# Patient Record
Sex: FEMALE | Race: WHITE | HISPANIC OR LATINO | URBAN - METROPOLITAN AREA
[De-identification: names, ages, dates, MRNs, and addresses within clinical notes are randomized per-mention and may not be internally consistent; named-entity substitution may affect disease eponyms.]

---

## 2017-02-27 ENCOUNTER — OUTPATIENT (OUTPATIENT)
Dept: OUTPATIENT SERVICES | Facility: HOSPITAL | Age: 22
LOS: 1 days | End: 2017-02-27
Payer: COMMERCIAL

## 2017-02-27 VITALS
TEMPERATURE: 97 F | SYSTOLIC BLOOD PRESSURE: 107 MMHG | DIASTOLIC BLOOD PRESSURE: 70 MMHG | WEIGHT: 173.94 LBS | RESPIRATION RATE: 18 BRPM | HEART RATE: 72 BPM | OXYGEN SATURATION: 100 %

## 2017-02-27 DIAGNOSIS — Z98.89 OTHER SPECIFIED POSTPROCEDURAL STATES: Chronic | ICD-10-CM

## 2017-02-27 DIAGNOSIS — Z90.49 ACQUIRED ABSENCE OF OTHER SPECIFIED PARTS OF DIGESTIVE TRACT: Chronic | ICD-10-CM

## 2017-02-27 DIAGNOSIS — Z98.890 OTHER SPECIFIED POSTPROCEDURAL STATES: Chronic | ICD-10-CM

## 2017-02-27 DIAGNOSIS — S83.242S OTHER TEAR OF MEDIAL MENISCUS, CURRENT INJURY, LEFT KNEE, SEQUELA: ICD-10-CM

## 2017-02-27 DIAGNOSIS — M25.562 PAIN IN LEFT KNEE: ICD-10-CM

## 2017-02-27 DIAGNOSIS — Z01.818 ENCOUNTER FOR OTHER PREPROCEDURAL EXAMINATION: ICD-10-CM

## 2017-02-27 DIAGNOSIS — M22.42 CHONDROMALACIA PATELLAE, LEFT KNEE: ICD-10-CM

## 2017-02-27 LAB
HCT VFR BLD CALC: 37.8 % — SIGNIFICANT CHANGE UP (ref 34.5–45)
HGB BLD-MCNC: 12.6 G/DL — SIGNIFICANT CHANGE UP (ref 11.5–15.5)
MCHC RBC-ENTMCNC: 27.9 PG — SIGNIFICANT CHANGE UP (ref 27–34)
MCHC RBC-ENTMCNC: 33.4 GM/DL — SIGNIFICANT CHANGE UP (ref 32–36)
MCV RBC AUTO: 83.6 FL — SIGNIFICANT CHANGE UP (ref 80–100)
PLATELET # BLD AUTO: 299 K/UL — SIGNIFICANT CHANGE UP (ref 150–400)
RBC # BLD: 4.52 M/UL — SIGNIFICANT CHANGE UP (ref 3.8–5.2)
RBC # FLD: 12 % — SIGNIFICANT CHANGE UP (ref 10.3–14.5)
WBC # BLD: 8.7 K/UL — SIGNIFICANT CHANGE UP (ref 3.8–10.5)
WBC # FLD AUTO: 8.7 K/UL — SIGNIFICANT CHANGE UP (ref 3.8–10.5)

## 2017-02-27 PROCEDURE — 85027 COMPLETE CBC AUTOMATED: CPT

## 2017-02-27 PROCEDURE — G0463: CPT

## 2017-02-27 NOTE — H&P PST ADULT - PROBLEM SELECTOR PLAN 1
scheduled left knee arthroscopy/left knee excision of ganglion cyst, left shoulder steroid injection  medical clearance in chart  pre op instructions provided

## 2017-02-27 NOTE — H&P PST ADULT - HISTORY OF PRESENT ILLNESS
20 y/o female with h/o fall couple of months ago injuring her left leg and left shoulder, scheduled for surgery. Not taking any pain meds at present

## 2017-02-27 NOTE — H&P PST ADULT - PMH
Hypothyroid    Left knee pain, unspecified chronicity History of pulmonary embolism  23 weeks gestation on VQ scan Had lovenox till delivery  Hypothyroid    Left knee pain, unspecified chronicity

## 2017-03-01 RX ORDER — CEFAZOLIN SODIUM 1 G
2000 VIAL (EA) INJECTION ONCE
Qty: 0 | Refills: 0 | Status: COMPLETED | OUTPATIENT
Start: 2017-03-03 | End: 2017-03-03

## 2017-03-01 NOTE — ASU DISCHARGE PLAN (ADULT/PEDIATRIC). - DRESSING FT
Replace dressing with dry gauze if saturated. Clean area with alcohol prior to first shower. Remove bandages in 2-3 days, wipe with alcohol and apply band-aids. Rewrap with ace bandage.

## 2017-03-01 NOTE — ASU DISCHARGE PLAN (ADULT/PEDIATRIC). - MEDICATION SUMMARY - MEDICATIONS TO TAKE
I will START or STAY ON the medications listed below when I get home from the hospital:    acetaminophen-oxyCODONE 325 mg-5 mg oral tablet  -- 1 tab(s) by mouth every 6 hours, As Needed -for moderate pain MDD:4  -- Caution federal law prohibits the transfer of this drug to any person other  than the person for whom it was prescribed.  May cause drowsiness.  Alcohol may intensify this effect.  Use care when operating dangerous machinery.  This prescription cannot be refilled.  This product contains acetaminophen.  Do not use  with any other product containing acetaminophen to prevent possible liver damage.  Using more of this medication than prescribed may cause serious breathing problems.    -- Indication: For Pain    Synthroid 75 mcg (0.075 mg) oral tablet  -- 1 tab(s) by mouth once a day  -- Indication: For Hypothyroid

## 2017-03-01 NOTE — ASU DISCHARGE PLAN (ADULT/PEDIATRIC). - NOTIFY
Pain not relieved by Medications/Fever greater than 101/Numbness, color, or temperature change to extremity/Bleeding that does not stop/Swelling that continues

## 2017-03-01 NOTE — ASU DISCHARGE PLAN (ADULT/PEDIATRIC). - ACTIVITY LEVEL
no tub baths/elevate extremity/no sports/gym/no heavy lifting/weight bearing as tolerated no tub baths/elevate extremity/no sports/gym/no heavy lifting/weight bearing as tolerated/Use cane or crutches for ambulation.

## 2017-03-01 NOTE — ASU DISCHARGE PLAN (ADULT/PEDIATRIC). - FOLLOWUP APPOINTMENT CLINIC/PHYSICIAN
Please call Dr. Bonds's office [phone] to make a post operative appointment to be seen in 2 weeks. Follow up with your medical doctor in 1 - 3 days to review your home medications. Please call Dr. NARA Bonds's office ([phone]) to schedule a follow-up appointment to be seen in 2 weeks.  Follow up with your medical doctor in 1 - 3 days to review your home medications.

## 2017-03-01 NOTE — ASU DISCHARGE PLAN (ADULT/PEDIATRIC). - INSTRUCTIONS
Fully flex and extend knee while standing 200x per day. Pain medications as directed by MD. Ice 20 minutes on and 20 minutes off while awake for next 48 hours elevate leg when sitting.  DO NOT make important decisions, drink alcohol, or operate machinery until after the first 24 hours and/or while on pain medication. DO NOT take aspirin or Motrin (Ibuprofen) any sooner than 24 hours after discharge from hospital. Fully flex and extend knee while standing 200x per day. Physical Therapy 5x / 1wk, then 4x / 2wks, then 2x / 3wks.  Take pain medications as directed by MD.  Ice 20 minutes on and 20 minutes off while awake for next 48 hours elevate leg when sitting. Follow instructions on prescriptions given and resume regular medications.

## 2017-03-02 ENCOUNTER — RESULT REVIEW (OUTPATIENT)
Age: 22
End: 2017-03-02

## 2017-03-02 NOTE — ASU PATIENT PROFILE, ADULT - PMH
History of pulmonary embolism  23 weeks gestation on VQ scan Had lovenox till delivery  Hypothyroid    Left knee pain, unspecified chronicity

## 2017-03-03 ENCOUNTER — TRANSCRIPTION ENCOUNTER (OUTPATIENT)
Age: 22
End: 2017-03-03

## 2017-03-03 ENCOUNTER — OUTPATIENT (OUTPATIENT)
Dept: OUTPATIENT SERVICES | Facility: HOSPITAL | Age: 22
LOS: 1 days | End: 2017-03-03
Payer: COMMERCIAL

## 2017-03-03 VITALS
OXYGEN SATURATION: 98 % | HEART RATE: 72 BPM | DIASTOLIC BLOOD PRESSURE: 58 MMHG | RESPIRATION RATE: 15 BRPM | SYSTOLIC BLOOD PRESSURE: 112 MMHG

## 2017-03-03 VITALS
DIASTOLIC BLOOD PRESSURE: 57 MMHG | HEART RATE: 68 BPM | TEMPERATURE: 98 F | RESPIRATION RATE: 13 BRPM | HEIGHT: 59 IN | WEIGHT: 173.06 LBS | SYSTOLIC BLOOD PRESSURE: 99 MMHG | OXYGEN SATURATION: 99 %

## 2017-03-03 DIAGNOSIS — Z90.49 ACQUIRED ABSENCE OF OTHER SPECIFIED PARTS OF DIGESTIVE TRACT: Chronic | ICD-10-CM

## 2017-03-03 DIAGNOSIS — Z98.890 OTHER SPECIFIED POSTPROCEDURAL STATES: Chronic | ICD-10-CM

## 2017-03-03 DIAGNOSIS — S83.242S OTHER TEAR OF MEDIAL MENISCUS, CURRENT INJURY, LEFT KNEE, SEQUELA: ICD-10-CM

## 2017-03-03 DIAGNOSIS — Z98.89 OTHER SPECIFIED POSTPROCEDURAL STATES: Chronic | ICD-10-CM

## 2017-03-03 DIAGNOSIS — M22.42 CHONDROMALACIA PATELLAE, LEFT KNEE: ICD-10-CM

## 2017-03-03 DIAGNOSIS — Z01.818 ENCOUNTER FOR OTHER PREPROCEDURAL EXAMINATION: ICD-10-CM

## 2017-03-03 LAB — HCG UR QL: NEGATIVE — SIGNIFICANT CHANGE UP

## 2017-03-03 PROCEDURE — 20610 DRAIN/INJ JOINT/BURSA W/O US: CPT | Mod: LT,XS

## 2017-03-03 PROCEDURE — 29875 ARTHRS KNEE SURG SYNVCT LMTD: CPT | Mod: LT

## 2017-03-03 PROCEDURE — 88304 TISSUE EXAM BY PATHOLOGIST: CPT | Mod: 26

## 2017-03-03 PROCEDURE — 88304 TISSUE EXAM BY PATHOLOGIST: CPT

## 2017-03-03 PROCEDURE — 81025 URINE PREGNANCY TEST: CPT

## 2017-03-03 RX ORDER — SODIUM CHLORIDE 9 MG/ML
1000 INJECTION, SOLUTION INTRAVENOUS
Qty: 0 | Refills: 0 | Status: DISCONTINUED | OUTPATIENT
Start: 2017-03-03 | End: 2017-03-03

## 2017-03-03 RX ORDER — OXYCODONE HYDROCHLORIDE 5 MG/1
1 TABLET ORAL
Qty: 28 | Refills: 0 | OUTPATIENT
Start: 2017-03-03 | End: 2017-03-10

## 2017-03-03 RX ORDER — HYDROMORPHONE HYDROCHLORIDE 2 MG/ML
0.5 INJECTION INTRAMUSCULAR; INTRAVENOUS; SUBCUTANEOUS
Qty: 0 | Refills: 0 | Status: DISCONTINUED | OUTPATIENT
Start: 2017-03-03 | End: 2017-03-03

## 2017-03-03 RX ADMIN — HYDROMORPHONE HYDROCHLORIDE 0.5 MILLIGRAM(S): 2 INJECTION INTRAMUSCULAR; INTRAVENOUS; SUBCUTANEOUS at 13:50

## 2017-03-03 RX ADMIN — SODIUM CHLORIDE 75 MILLILITER(S): 9 INJECTION, SOLUTION INTRAVENOUS at 13:43

## 2017-03-03 RX ADMIN — HYDROMORPHONE HYDROCHLORIDE 0.5 MILLIGRAM(S): 2 INJECTION INTRAMUSCULAR; INTRAVENOUS; SUBCUTANEOUS at 13:33

## 2017-03-03 NOTE — PHYSICAL THERAPY INITIAL EVALUATION ADULT - DID THE PATIENT HAVE SURGERY?
yes/s/p arthroscopy chondroplasty excision of ganglion cyst left knee/steroid injection left shoulder

## 2017-07-25 ENCOUNTER — EMERGENCY (EMERGENCY)
Facility: HOSPITAL | Age: 22
LOS: 1 days | Discharge: HOME | End: 2017-07-25
Attending: STUDENT IN AN ORGANIZED HEALTH CARE EDUCATION/TRAINING PROGRAM
Payer: COMMERCIAL

## 2017-07-25 VITALS — RESPIRATION RATE: 20 BRPM | OXYGEN SATURATION: 100 % | TEMPERATURE: 99 F | HEART RATE: 72 BPM | WEIGHT: 182.98 LBS

## 2017-07-25 DIAGNOSIS — Z90.49 ACQUIRED ABSENCE OF OTHER SPECIFIED PARTS OF DIGESTIVE TRACT: Chronic | ICD-10-CM

## 2017-07-25 DIAGNOSIS — Z98.89 OTHER SPECIFIED POSTPROCEDURAL STATES: Chronic | ICD-10-CM

## 2017-07-25 DIAGNOSIS — Z98.890 OTHER SPECIFIED POSTPROCEDURAL STATES: Chronic | ICD-10-CM

## 2017-07-25 LAB
ALBUMIN SERPL ELPH-MCNC: 4.5 G/DL — SIGNIFICANT CHANGE UP (ref 3.3–5.2)
ALP SERPL-CCNC: 76 U/L — SIGNIFICANT CHANGE UP (ref 40–120)
ALT FLD-CCNC: 27 U/L — SIGNIFICANT CHANGE UP
ANION GAP SERPL CALC-SCNC: 12 MMOL/L — SIGNIFICANT CHANGE UP (ref 5–17)
AST SERPL-CCNC: 17 U/L — SIGNIFICANT CHANGE UP
BASOPHILS # BLD AUTO: 0 K/UL — SIGNIFICANT CHANGE UP (ref 0–0.2)
BASOPHILS NFR BLD AUTO: 0.2 % — SIGNIFICANT CHANGE UP (ref 0–2)
BILIRUB SERPL-MCNC: 0.2 MG/DL — LOW (ref 0.4–2)
BUN SERPL-MCNC: 10 MG/DL — SIGNIFICANT CHANGE UP (ref 8–20)
CALCIUM SERPL-MCNC: 9.3 MG/DL — SIGNIFICANT CHANGE UP (ref 8.6–10.2)
CHLORIDE SERPL-SCNC: 102 MMOL/L — SIGNIFICANT CHANGE UP (ref 98–107)
CK SERPL-CCNC: 44 U/L — SIGNIFICANT CHANGE UP (ref 25–170)
CO2 SERPL-SCNC: 26 MMOL/L — SIGNIFICANT CHANGE UP (ref 22–29)
CREAT SERPL-MCNC: 0.41 MG/DL — LOW (ref 0.5–1.3)
EOSINOPHIL # BLD AUTO: 0.3 K/UL — SIGNIFICANT CHANGE UP (ref 0–0.5)
EOSINOPHIL NFR BLD AUTO: 3.5 % — SIGNIFICANT CHANGE UP (ref 0–6)
GLUCOSE SERPL-MCNC: 99 MG/DL — SIGNIFICANT CHANGE UP (ref 70–115)
HCG UR QL: NEGATIVE — SIGNIFICANT CHANGE UP
HCT VFR BLD CALC: 37 % — SIGNIFICANT CHANGE UP (ref 37–47)
HGB BLD-MCNC: 12.2 G/DL — SIGNIFICANT CHANGE UP (ref 12–16)
LIDOCAIN IGE QN: 25 U/L — SIGNIFICANT CHANGE UP (ref 22–51)
LYMPHOCYTES # BLD AUTO: 2.3 K/UL — SIGNIFICANT CHANGE UP (ref 1–4.8)
LYMPHOCYTES # BLD AUTO: 27 % — SIGNIFICANT CHANGE UP (ref 20–55)
MCHC RBC-ENTMCNC: 27.6 PG — SIGNIFICANT CHANGE UP (ref 27–31)
MCHC RBC-ENTMCNC: 33 G/DL — SIGNIFICANT CHANGE UP (ref 32–36)
MCV RBC AUTO: 83.7 FL — SIGNIFICANT CHANGE UP (ref 81–99)
MONOCYTES # BLD AUTO: 0.7 K/UL — SIGNIFICANT CHANGE UP (ref 0–0.8)
MONOCYTES NFR BLD AUTO: 8.3 % — SIGNIFICANT CHANGE UP (ref 3–10)
NEUTROPHILS # BLD AUTO: 5.2 K/UL — SIGNIFICANT CHANGE UP (ref 1.8–8)
NEUTROPHILS NFR BLD AUTO: 60.5 % — SIGNIFICANT CHANGE UP (ref 37–73)
NT-PROBNP SERPL-SCNC: 18 PG/ML — SIGNIFICANT CHANGE UP (ref 0–300)
PLATELET # BLD AUTO: 320 K/UL — SIGNIFICANT CHANGE UP (ref 150–400)
POTASSIUM SERPL-MCNC: 4 MMOL/L — SIGNIFICANT CHANGE UP (ref 3.5–5.3)
POTASSIUM SERPL-SCNC: 4 MMOL/L — SIGNIFICANT CHANGE UP (ref 3.5–5.3)
PROT SERPL-MCNC: 7.5 G/DL — SIGNIFICANT CHANGE UP (ref 6.6–8.7)
RBC # BLD: 4.42 M/UL — SIGNIFICANT CHANGE UP (ref 4.4–5.2)
RBC # FLD: 13.8 % — SIGNIFICANT CHANGE UP (ref 11–15.6)
SODIUM SERPL-SCNC: 140 MMOL/L — SIGNIFICANT CHANGE UP (ref 135–145)
TROPONIN T SERPL-MCNC: <0.01 NG/ML — SIGNIFICANT CHANGE UP (ref 0–0.06)
WBC # BLD: 8.6 K/UL — SIGNIFICANT CHANGE UP (ref 4.8–10.8)
WBC # FLD AUTO: 8.6 K/UL — SIGNIFICANT CHANGE UP (ref 4.8–10.8)

## 2017-07-25 PROCEDURE — 81025 URINE PREGNANCY TEST: CPT

## 2017-07-25 PROCEDURE — 93005 ELECTROCARDIOGRAM TRACING: CPT

## 2017-07-25 PROCEDURE — 84484 ASSAY OF TROPONIN QUANT: CPT

## 2017-07-25 PROCEDURE — 93010 ELECTROCARDIOGRAM REPORT: CPT

## 2017-07-25 PROCEDURE — 85027 COMPLETE CBC AUTOMATED: CPT

## 2017-07-25 PROCEDURE — 71275 CT ANGIOGRAPHY CHEST: CPT | Mod: 26

## 2017-07-25 PROCEDURE — 36415 COLL VENOUS BLD VENIPUNCTURE: CPT

## 2017-07-25 PROCEDURE — 83690 ASSAY OF LIPASE: CPT

## 2017-07-25 PROCEDURE — 71046 X-RAY EXAM CHEST 2 VIEWS: CPT

## 2017-07-25 PROCEDURE — 83880 ASSAY OF NATRIURETIC PEPTIDE: CPT

## 2017-07-25 PROCEDURE — 82550 ASSAY OF CK (CPK): CPT

## 2017-07-25 PROCEDURE — 99284 EMERGENCY DEPT VISIT MOD MDM: CPT | Mod: 25

## 2017-07-25 PROCEDURE — 80053 COMPREHEN METABOLIC PANEL: CPT

## 2017-07-25 PROCEDURE — 71020: CPT | Mod: 26

## 2017-07-25 PROCEDURE — 71275 CT ANGIOGRAPHY CHEST: CPT

## 2017-07-25 PROCEDURE — 99285 EMERGENCY DEPT VISIT HI MDM: CPT

## 2017-07-25 RX ORDER — SODIUM CHLORIDE 9 MG/ML
3 INJECTION INTRAMUSCULAR; INTRAVENOUS; SUBCUTANEOUS ONCE
Qty: 0 | Refills: 0 | Status: COMPLETED | OUTPATIENT
Start: 2017-07-25 | End: 2017-07-25

## 2017-07-25 RX ADMIN — SODIUM CHLORIDE 3 MILLILITER(S): 9 INJECTION INTRAMUSCULAR; INTRAVENOUS; SUBCUTANEOUS at 20:21

## 2017-07-25 NOTE — ED ADULT TRIAGE NOTE - CHIEF COMPLAINT QUOTE
dizzy, denies pregnancy. dyspnea, history of lung clots. used to be on Lovenox and heparin. has dyspnea now.

## 2017-07-25 NOTE — ED PROVIDER NOTE - OBJECTIVE STATEMENT
23 y/o F with hx of hypothyroidism presents to the ED c/o dizziness/lightheadedness that began today. Pt states she woke up this morning "breathing weird" and later in the afternoon her dizziness onset, describing it as room-spinning. She states that she had felt similar sx in the past when she developed a blood clot ~1 year ago. Pt reports that at that time she had been pregnant and was on Lovenox and Heparin. Pt was also prescribed 81mg ASA and is currently prescribed synthroid for her hypothyroidism however pt admits that she has not been compliant with any of her medications. Denies fever, chills, abd pain, n/v/d, back pain, leg swelling, calf pain, recent travel. +PMD (pt cannot recall name). No complications with past pregnancy. No further complaints at this time.

## 2017-07-25 NOTE — ED PROVIDER NOTE - PSH
H/O dilation and curettage    H/O ovarian cystectomy    History of Appendectomy    S/P appendectomy    S/P appendectomy  2001  S/P cholecystectomy    S/P cholecystectomy  2013  S/P cholecystectomy    S/P ovarian cystectomy  2014

## 2017-07-25 NOTE — ED PROVIDER NOTE - PMH
Abdominal Pain    History of pulmonary embolism  23 weeks gestation on VQ scan Had lovenox till delivery  Hypothyroid    Hypothyroid    Hypothyroidism    Left knee pain, unspecified chronicity    PE (pulmonary embolism)

## 2017-07-26 VITALS
TEMPERATURE: 98 F | HEART RATE: 70 BPM | SYSTOLIC BLOOD PRESSURE: 116 MMHG | RESPIRATION RATE: 18 BRPM | OXYGEN SATURATION: 98 % | DIASTOLIC BLOOD PRESSURE: 76 MMHG

## 2017-07-26 NOTE — ED ADULT NURSE NOTE - OBJECTIVE STATEMENT
Patient presents to ED c/o dizziness/lightheadedness that began this AM. Pt states she woke up this morning "breathing weird" and later in the afternoon her dizziness onset, describing it as room-spinning. She states that she had felt similar sx in the past when she developed a blood clot ~1 year ago. Pt reports that at that time she had been pregnant and was on Lovenox and Heparin. Pt was also prescribed 81mg ASA and is currently prescribed synthroid for her hypothyroidism however pt admits that she has not been compliant with any of her medications. Denies fever, chills, abd pain, n/v/d, back pain, leg swelling, calf pain, recent travel. +PMD (pt cannot recall name). No complications with past pregnancy. No further complaints at this time. Patient presents to ED c/o dizziness/lightheadedness that began this AM. Pt states she woke up this morning "breathing weird" and later in the afternoon her dizziness onset, describing it as room-spinning. Patient states was on Lovenox and Heparin for PE last year, resp even/unlabored, lungs CTAB.

## 2017-11-28 ENCOUNTER — TRANSCRIPTION ENCOUNTER (OUTPATIENT)
Age: 22
End: 2017-11-28

## 2017-12-04 ENCOUNTER — EMERGENCY (EMERGENCY)
Facility: HOSPITAL | Age: 22
LOS: 1 days | Discharge: HOME | End: 2017-12-04
Attending: EMERGENCY MEDICINE
Payer: COMMERCIAL

## 2017-12-04 ENCOUNTER — TRANSCRIPTION ENCOUNTER (OUTPATIENT)
Age: 22
End: 2017-12-04

## 2017-12-04 VITALS
HEART RATE: 79 BPM | RESPIRATION RATE: 18 BRPM | TEMPERATURE: 98 F | SYSTOLIC BLOOD PRESSURE: 128 MMHG | DIASTOLIC BLOOD PRESSURE: 85 MMHG | HEIGHT: 59 IN | OXYGEN SATURATION: 100 % | WEIGHT: 182.1 LBS

## 2017-12-04 DIAGNOSIS — Z98.89 OTHER SPECIFIED POSTPROCEDURAL STATES: Chronic | ICD-10-CM

## 2017-12-04 DIAGNOSIS — Z90.49 ACQUIRED ABSENCE OF OTHER SPECIFIED PARTS OF DIGESTIVE TRACT: Chronic | ICD-10-CM

## 2017-12-04 DIAGNOSIS — Z98.890 OTHER SPECIFIED POSTPROCEDURAL STATES: Chronic | ICD-10-CM

## 2017-12-04 LAB
ALBUMIN SERPL ELPH-MCNC: 4.2 G/DL — SIGNIFICANT CHANGE UP (ref 3.3–5.2)
ALP SERPL-CCNC: 87 U/L — SIGNIFICANT CHANGE UP (ref 40–120)
ALT FLD-CCNC: 39 U/L — HIGH
ANION GAP SERPL CALC-SCNC: 11 MMOL/L — SIGNIFICANT CHANGE UP (ref 5–17)
APPEARANCE UR: ABNORMAL
APTT BLD: 28.8 SEC — SIGNIFICANT CHANGE UP (ref 27.5–37.4)
AST SERPL-CCNC: 25 U/L — SIGNIFICANT CHANGE UP
BASOPHILS # BLD AUTO: 0.1 K/UL — SIGNIFICANT CHANGE UP (ref 0–0.2)
BASOPHILS NFR BLD AUTO: 0.7 % — SIGNIFICANT CHANGE UP (ref 0–2)
BILIRUB SERPL-MCNC: 0.1 MG/DL — LOW (ref 0.4–2)
BILIRUB UR-MCNC: NEGATIVE — SIGNIFICANT CHANGE UP
BUN SERPL-MCNC: 10 MG/DL — SIGNIFICANT CHANGE UP (ref 8–20)
CALCIUM SERPL-MCNC: 9.2 MG/DL — SIGNIFICANT CHANGE UP (ref 8.6–10.2)
CHLORIDE SERPL-SCNC: 102 MMOL/L — SIGNIFICANT CHANGE UP (ref 98–107)
CO2 SERPL-SCNC: 27 MMOL/L — SIGNIFICANT CHANGE UP (ref 22–29)
COLOR SPEC: YELLOW — SIGNIFICANT CHANGE UP
CREAT SERPL-MCNC: 0.42 MG/DL — LOW (ref 0.5–1.3)
DIFF PNL FLD: ABNORMAL
EOSINOPHIL # BLD AUTO: 0.7 K/UL — HIGH (ref 0–0.5)
EOSINOPHIL NFR BLD AUTO: 7.1 % — HIGH (ref 0–6)
EPI CELLS # UR: SIGNIFICANT CHANGE UP
GLUCOSE SERPL-MCNC: 87 MG/DL — SIGNIFICANT CHANGE UP (ref 70–115)
GLUCOSE UR QL: NEGATIVE MG/DL — SIGNIFICANT CHANGE UP
HCG UR QL: NEGATIVE — SIGNIFICANT CHANGE UP
HCT VFR BLD CALC: 38.5 % — SIGNIFICANT CHANGE UP (ref 37–47)
HGB BLD-MCNC: 12.7 G/DL — SIGNIFICANT CHANGE UP (ref 12–16)
INR BLD: 0.97 RATIO — SIGNIFICANT CHANGE UP (ref 0.88–1.16)
KETONES UR-MCNC: NEGATIVE — SIGNIFICANT CHANGE UP
LEUKOCYTE ESTERASE UR-ACNC: ABNORMAL
LYMPHOCYTES # BLD AUTO: 3.1 K/UL — SIGNIFICANT CHANGE UP (ref 1–4.8)
LYMPHOCYTES # BLD AUTO: 30.2 % — SIGNIFICANT CHANGE UP (ref 20–55)
MCHC RBC-ENTMCNC: 27.9 PG — SIGNIFICANT CHANGE UP (ref 27–31)
MCHC RBC-ENTMCNC: 33 G/DL — SIGNIFICANT CHANGE UP (ref 32–36)
MCV RBC AUTO: 84.4 FL — SIGNIFICANT CHANGE UP (ref 81–99)
MONOCYTES # BLD AUTO: 0.9 K/UL — HIGH (ref 0–0.8)
MONOCYTES NFR BLD AUTO: 8.9 % — SIGNIFICANT CHANGE UP (ref 3–10)
NEUTROPHILS # BLD AUTO: 5.5 K/UL — SIGNIFICANT CHANGE UP (ref 1.8–8)
NEUTROPHILS NFR BLD AUTO: 52.8 % — SIGNIFICANT CHANGE UP (ref 37–73)
NITRITE UR-MCNC: NEGATIVE — SIGNIFICANT CHANGE UP
PH UR: 6 — SIGNIFICANT CHANGE UP (ref 5–8)
PLATELET # BLD AUTO: 330 K/UL — SIGNIFICANT CHANGE UP (ref 150–400)
POTASSIUM SERPL-MCNC: 4.4 MMOL/L — SIGNIFICANT CHANGE UP (ref 3.5–5.3)
POTASSIUM SERPL-SCNC: 4.4 MMOL/L — SIGNIFICANT CHANGE UP (ref 3.5–5.3)
PROT SERPL-MCNC: 7.6 G/DL — SIGNIFICANT CHANGE UP (ref 6.6–8.7)
PROT UR-MCNC: 30 MG/DL
PROTHROM AB SERPL-ACNC: 10.7 SEC — SIGNIFICANT CHANGE UP (ref 9.8–12.7)
RBC # BLD: 4.56 M/UL — SIGNIFICANT CHANGE UP (ref 4.4–5.2)
RBC # FLD: 13.7 % — SIGNIFICANT CHANGE UP (ref 11–15.6)
RBC CASTS # UR COMP ASSIST: ABNORMAL /HPF (ref 0–4)
SODIUM SERPL-SCNC: 140 MMOL/L — SIGNIFICANT CHANGE UP (ref 135–145)
SP GR SPEC: 1.02 — SIGNIFICANT CHANGE UP (ref 1.01–1.02)
UROBILINOGEN FLD QL: NEGATIVE MG/DL — SIGNIFICANT CHANGE UP
WBC # BLD: 10.4 K/UL — SIGNIFICANT CHANGE UP (ref 4.8–10.8)
WBC # FLD AUTO: 10.4 K/UL — SIGNIFICANT CHANGE UP (ref 4.8–10.8)
WBC UR QL: SIGNIFICANT CHANGE UP

## 2017-12-04 PROCEDURE — 81025 URINE PREGNANCY TEST: CPT

## 2017-12-04 PROCEDURE — 70450 CT HEAD/BRAIN W/O DYE: CPT

## 2017-12-04 PROCEDURE — 85027 COMPLETE CBC AUTOMATED: CPT

## 2017-12-04 PROCEDURE — 99284 EMERGENCY DEPT VISIT MOD MDM: CPT

## 2017-12-04 PROCEDURE — 96375 TX/PRO/DX INJ NEW DRUG ADDON: CPT

## 2017-12-04 PROCEDURE — 70450 CT HEAD/BRAIN W/O DYE: CPT | Mod: 26

## 2017-12-04 PROCEDURE — 96374 THER/PROPH/DIAG INJ IV PUSH: CPT

## 2017-12-04 PROCEDURE — 81001 URINALYSIS AUTO W/SCOPE: CPT

## 2017-12-04 PROCEDURE — 80053 COMPREHEN METABOLIC PANEL: CPT

## 2017-12-04 PROCEDURE — 36415 COLL VENOUS BLD VENIPUNCTURE: CPT

## 2017-12-04 PROCEDURE — 99284 EMERGENCY DEPT VISIT MOD MDM: CPT | Mod: 25

## 2017-12-04 PROCEDURE — 85610 PROTHROMBIN TIME: CPT

## 2017-12-04 PROCEDURE — 85730 THROMBOPLASTIN TIME PARTIAL: CPT

## 2017-12-04 RX ORDER — LEVOTHYROXINE SODIUM 125 MCG
1 TABLET ORAL
Qty: 0 | Refills: 0 | COMMUNITY

## 2017-12-04 RX ORDER — KETOROLAC TROMETHAMINE 30 MG/ML
30 SYRINGE (ML) INJECTION ONCE
Qty: 0 | Refills: 0 | Status: DISCONTINUED | OUTPATIENT
Start: 2017-12-04 | End: 2017-12-04

## 2017-12-04 RX ORDER — SODIUM CHLORIDE 9 MG/ML
3 INJECTION INTRAMUSCULAR; INTRAVENOUS; SUBCUTANEOUS ONCE
Qty: 0 | Refills: 0 | Status: COMPLETED | OUTPATIENT
Start: 2017-12-04 | End: 2017-12-04

## 2017-12-04 RX ORDER — METOCLOPRAMIDE HCL 10 MG
10 TABLET ORAL ONCE
Qty: 0 | Refills: 0 | Status: COMPLETED | OUTPATIENT
Start: 2017-12-04 | End: 2017-12-04

## 2017-12-04 RX ADMIN — Medication 30 MILLIGRAM(S): at 23:30

## 2017-12-04 RX ADMIN — SODIUM CHLORIDE 3 MILLILITER(S): 9 INJECTION INTRAMUSCULAR; INTRAVENOUS; SUBCUTANEOUS at 22:30

## 2017-12-04 RX ADMIN — Medication 10 MILLIGRAM(S): at 23:31

## 2017-12-04 NOTE — ED ADULT NURSE NOTE - CHIEF COMPLAINT QUOTE
HA with nausea and vomiting dizziness speech impaired resolved x 2 days, was seen at [facility]

## 2017-12-04 NOTE — ED STATDOCS - OBJECTIVE STATEMENT
21 y/o F pt with a PMHx of PE, hypothyroid, cholecystectomy, appendectomy, and ovarian cystectomy presents to the ED c/o headache x2 days with associated intermittent episodes of vomiting. woke up with episodes of vomiting. Pt localizes the headache to the L side of her face. She explains that she was at work and her co worker though that she was under the influence because she was speaking differently. Pt went to an urgent care center where she was told to come the ED for a CT scan of her head. She explains she had her menses 1 week ago and it restarted today. She stopped taking blood thinners because she did not follow up with her PMD, did not get a refill of the medication. Taking Tylenol with no relief. Non smoker, non drinker, no illicit drug use. denies fever. denies neck pain. no chest pain or sob. no abd pain. no diarrhea, constipation no urinary f/u/d. no back pain. no motor or sensory deficits. denies illicit drug use. no recent travel or sick contacts. no rash. no other acute issues symptoms or concerns. nkda.

## 2017-12-04 NOTE — ED STATDOCS - PSH
H/O dilation and curettage    H/O ovarian cystectomy    History of Appendectomy    S/P appendectomy  2001  S/P appendectomy    S/P cholecystectomy  2013  S/P cholecystectomy    S/P cholecystectomy    S/P ovarian cystectomy  2014

## 2017-12-04 NOTE — ED STATDOCS - ATTENDING CONTRIBUTION TO CARE
I, Johan Simon, performed the initial face to face bedside interview with this patient regarding history of present illness, review of symptoms and relevant past medical, social and family history.  I completed an independent physical examination.  I was the initial provider who evaluated this patient. I have signed out the follow up of any pending tests (i.e. labs, radiological studies) to the ACP.  I have communicated the patient’s plan of care and disposition with the ACP.

## 2017-12-04 NOTE — ED STATDOCS - PROGRESS NOTE DETAILS
23 yo F PT complaining of headache. PT seen by intake MD, agree with H&P, orders, and plan. Reassessment: PT is asymptomatic at this time, no N/V deficits. PT referral to Neurologist. RX nsaid, zofran. PT verbalized understanding of diagnosis and importance of follow up at PMD. PT educated on importance of follow up and when to return to the ED.

## 2017-12-04 NOTE — ED ADULT NURSE NOTE - OBJECTIVE STATEMENT
Reports HA x2days with blurred vision dizziness and incoherent speech, reports taking ibuprofen and Aleve with no relief

## 2017-12-05 RX ORDER — ONDANSETRON 8 MG/1
1 TABLET, FILM COATED ORAL
Qty: 6 | Refills: 0 | OUTPATIENT
Start: 2017-12-05 | End: 2017-12-07

## 2017-12-05 RX ORDER — IBUPROFEN 200 MG
1 TABLET ORAL
Qty: 20 | Refills: 0
Start: 2017-12-05 | End: 2017-12-10

## 2017-12-18 ENCOUNTER — APPOINTMENT (OUTPATIENT)
Dept: OBGYN | Facility: CLINIC | Age: 22
End: 2017-12-18

## 2018-01-12 ENCOUNTER — APPOINTMENT (OUTPATIENT)
Dept: GASTROENTEROLOGY | Facility: CLINIC | Age: 23
End: 2018-01-12
Payer: MEDICAID

## 2018-01-12 VITALS — BODY MASS INDEX: 36.69 KG/M2 | WEIGHT: 182 LBS | HEIGHT: 59 IN

## 2018-01-12 VITALS
WEIGHT: 182 LBS | HEART RATE: 75 BPM | SYSTOLIC BLOOD PRESSURE: 114 MMHG | RESPIRATION RATE: 14 BRPM | HEIGHT: 59 IN | BODY MASS INDEX: 36.69 KG/M2 | DIASTOLIC BLOOD PRESSURE: 85 MMHG

## 2018-01-12 DIAGNOSIS — Z86.39 PERSONAL HISTORY OF OTHER ENDOCRINE, NUTRITIONAL AND METABOLIC DISEASE: ICD-10-CM

## 2018-01-12 PROCEDURE — 99214 OFFICE O/P EST MOD 30 MIN: CPT

## 2018-01-12 RX ORDER — AMOXICILLIN AND CLAVULANATE POTASSIUM 875; 125 MG/1; MG/1
875-125 TABLET, COATED ORAL
Qty: 20 | Refills: 0 | Status: DISCONTINUED | COMMUNITY
Start: 2017-11-28 | End: 2018-01-12

## 2018-01-12 RX ORDER — DOXYCYCLINE 100 MG/1
100 CAPSULE ORAL
Qty: 28 | Refills: 0 | Status: DISCONTINUED | COMMUNITY
Start: 2017-08-10 | End: 2018-01-12

## 2018-01-12 RX ORDER — IBUPROFEN 600 MG/1
600 TABLET, FILM COATED ORAL
Qty: 20 | Refills: 0 | Status: DISCONTINUED | COMMUNITY
Start: 2017-12-05 | End: 2018-01-12

## 2018-01-12 RX ORDER — BROMPHENIRAMINE MALEATE, PSEUDOEPHEDRINE HYDROCHLORIDE, 2; 30; 10 MG/5ML; MG/5ML; MG/5ML
30-2-10 SYRUP ORAL
Qty: 300 | Refills: 0 | Status: DISCONTINUED | COMMUNITY
Start: 2017-11-28 | End: 2018-01-12

## 2018-01-12 RX ORDER — ONDANSETRON 4 MG/1
4 TABLET ORAL
Qty: 6 | Refills: 0 | Status: DISCONTINUED | COMMUNITY
Start: 2017-12-05 | End: 2018-01-12

## 2018-01-12 RX ORDER — ERGOCALCIFEROL 1.25 MG/1
1.25 MG CAPSULE, LIQUID FILLED ORAL
Qty: 4 | Refills: 0 | Status: DISCONTINUED | COMMUNITY
Start: 2017-08-10 | End: 2018-01-12

## 2018-01-12 RX ORDER — FLUTICASONE PROPIONATE 50 UG/1
50 SPRAY, METERED NASAL
Qty: 16 | Refills: 0 | Status: DISCONTINUED | COMMUNITY
Start: 2017-11-28 | End: 2018-01-12

## 2018-04-01 ENCOUNTER — TRANSCRIPTION ENCOUNTER (OUTPATIENT)
Age: 23
End: 2018-04-01

## 2018-05-10 ENCOUNTER — TRANSCRIPTION ENCOUNTER (OUTPATIENT)
Age: 23
End: 2018-05-10

## 2018-05-23 ENCOUNTER — TRANSCRIPTION ENCOUNTER (OUTPATIENT)
Age: 23
End: 2018-05-23

## 2018-06-08 ENCOUNTER — RESULT REVIEW (OUTPATIENT)
Age: 23
End: 2018-06-08

## 2018-07-05 ENCOUNTER — TRANSCRIPTION ENCOUNTER (OUTPATIENT)
Age: 23
End: 2018-07-05

## 2018-07-30 PROBLEM — Z86.711 PERSONAL HISTORY OF PULMONARY EMBOLISM: Chronic | Status: ACTIVE | Noted: 2017-02-27

## 2018-08-06 ENCOUNTER — OUTPATIENT (OUTPATIENT)
Dept: OUTPATIENT SERVICES | Facility: HOSPITAL | Age: 23
LOS: 1 days | End: 2018-08-06
Payer: COMMERCIAL

## 2018-08-06 ENCOUNTER — RESULT REVIEW (OUTPATIENT)
Age: 23
End: 2018-08-06

## 2018-08-06 ENCOUNTER — APPOINTMENT (OUTPATIENT)
Dept: GASTROENTEROLOGY | Facility: GI CENTER | Age: 23
End: 2018-08-06
Payer: MEDICAID

## 2018-08-06 VITALS
DIASTOLIC BLOOD PRESSURE: 84 MMHG | BODY MASS INDEX: 36.69 KG/M2 | TEMPERATURE: 98 F | HEIGHT: 59 IN | SYSTOLIC BLOOD PRESSURE: 124 MMHG | RESPIRATION RATE: 12 BRPM | WEIGHT: 182 LBS | HEART RATE: 70 BPM

## 2018-08-06 DIAGNOSIS — Z98.89 OTHER SPECIFIED POSTPROCEDURAL STATES: Chronic | ICD-10-CM

## 2018-08-06 DIAGNOSIS — Z90.49 ACQUIRED ABSENCE OF OTHER SPECIFIED PARTS OF DIGESTIVE TRACT: Chronic | ICD-10-CM

## 2018-08-06 DIAGNOSIS — R19.7 DIARRHEA, UNSPECIFIED: ICD-10-CM

## 2018-08-06 DIAGNOSIS — Z98.890 OTHER SPECIFIED POSTPROCEDURAL STATES: Chronic | ICD-10-CM

## 2018-08-06 PROCEDURE — 88305 TISSUE EXAM BY PATHOLOGIST: CPT | Mod: 26

## 2018-08-06 PROCEDURE — 88305 TISSUE EXAM BY PATHOLOGIST: CPT

## 2018-08-06 PROCEDURE — 45380 COLONOSCOPY AND BIOPSY: CPT

## 2018-08-08 LAB — SURGICAL PATHOLOGY FINAL REPORT - CH: SIGNIFICANT CHANGE UP

## 2018-10-20 DIAGNOSIS — E04.1 NONTOXIC SINGLE THYROID NODULE: ICD-10-CM

## 2018-11-14 NOTE — PHYSICAL THERAPY INITIAL EVALUATION ADULT - IMPAIRMENTS CONTRIBUTING TO GAIT DEVIATIONS, PT EVAL
Please notify patient that it was only one  that recalled the losartan. If he still wants to switch, then We would probably have to separate the two meds.  Continue hydrochlorothiazide 25 daily and add olmesartan (benicar) 20 mg daily. I'm not sure if insurance will cover the Benicar if his losartan hasn't been recalled. If he is ok with it, then please send the two meds to his pharmacy. If he makes the switch, then he needs a follow up appointment in one month.    decreased strength

## 2019-02-15 ENCOUNTER — EMERGENCY (EMERGENCY)
Facility: HOSPITAL | Age: 24
LOS: 1 days | Discharge: HOME | End: 2019-02-15
Attending: STUDENT IN AN ORGANIZED HEALTH CARE EDUCATION/TRAINING PROGRAM
Payer: COMMERCIAL

## 2019-02-15 VITALS
HEIGHT: 59 IN | TEMPERATURE: 99 F | DIASTOLIC BLOOD PRESSURE: 76 MMHG | WEIGHT: 182.98 LBS | HEART RATE: 81 BPM | RESPIRATION RATE: 20 BRPM | SYSTOLIC BLOOD PRESSURE: 129 MMHG

## 2019-02-15 DIAGNOSIS — Z98.89 OTHER SPECIFIED POSTPROCEDURAL STATES: Chronic | ICD-10-CM

## 2019-02-15 DIAGNOSIS — Z90.49 ACQUIRED ABSENCE OF OTHER SPECIFIED PARTS OF DIGESTIVE TRACT: Chronic | ICD-10-CM

## 2019-02-15 DIAGNOSIS — Z98.890 OTHER SPECIFIED POSTPROCEDURAL STATES: Chronic | ICD-10-CM

## 2019-02-15 LAB
BASOPHILS # BLD AUTO: 0 K/UL — SIGNIFICANT CHANGE UP (ref 0–0.2)
BASOPHILS NFR BLD AUTO: 0.3 % — SIGNIFICANT CHANGE UP (ref 0–2)
EOSINOPHIL # BLD AUTO: 0.5 K/UL — SIGNIFICANT CHANGE UP (ref 0–0.5)
EOSINOPHIL NFR BLD AUTO: 5.2 % — SIGNIFICANT CHANGE UP (ref 0–6)
HCT VFR BLD CALC: 35.9 % — LOW (ref 37–47)
HGB BLD-MCNC: 11.8 G/DL — LOW (ref 12–16)
LYMPHOCYTES # BLD AUTO: 2.4 K/UL — SIGNIFICANT CHANGE UP (ref 1–4.8)
LYMPHOCYTES # BLD AUTO: 26.2 % — SIGNIFICANT CHANGE UP (ref 20–55)
MCHC RBC-ENTMCNC: 27.7 PG — SIGNIFICANT CHANGE UP (ref 27–31)
MCHC RBC-ENTMCNC: 32.9 G/DL — SIGNIFICANT CHANGE UP (ref 32–36)
MCV RBC AUTO: 84.3 FL — SIGNIFICANT CHANGE UP (ref 81–99)
MONOCYTES # BLD AUTO: 1 K/UL — HIGH (ref 0–0.8)
MONOCYTES NFR BLD AUTO: 10.3 % — HIGH (ref 3–10)
NEUTROPHILS # BLD AUTO: 5.3 K/UL — SIGNIFICANT CHANGE UP (ref 1.8–8)
NEUTROPHILS NFR BLD AUTO: 57.7 % — SIGNIFICANT CHANGE UP (ref 37–73)
PLATELET # BLD AUTO: 309 K/UL — SIGNIFICANT CHANGE UP (ref 150–400)
RBC # BLD: 4.26 M/UL — LOW (ref 4.4–5.2)
RBC # FLD: 13.7 % — SIGNIFICANT CHANGE UP (ref 11–15.6)
WBC # BLD: 9.3 K/UL — SIGNIFICANT CHANGE UP (ref 4.8–10.8)
WBC # FLD AUTO: 9.3 K/UL — SIGNIFICANT CHANGE UP (ref 4.8–10.8)

## 2019-02-15 PROCEDURE — 99284 EMERGENCY DEPT VISIT MOD MDM: CPT | Mod: 25

## 2019-02-15 PROCEDURE — 93010 ELECTROCARDIOGRAM REPORT: CPT

## 2019-02-15 NOTE — ED ADULT NURSE NOTE - OBJECTIVE STATEMENT
Pt A&Ox4 c/o Chest pain x3 days with occasional SOB at this time. Pt resting comfortably, VSS, no signs of distress at this time, CM in place, #20 LAC labs sent, safety maintained, call bell in reach.

## 2019-02-15 NOTE — ED PROVIDER NOTE - CLINICAL SUMMARY MEDICAL DECISION MAKING FREE TEXT BOX
24 y/o F with PMHx of hypothyroid (on Synthroid) and PSHx of appendectomy, cholecystectomy and ovarian cyst removal presents to ED c/o intermittent episodes of chest pain, described as stabbing and sharp onset 3 days ago. 24 y/o F with PMHx of hypothyroid (on Synthroid) and PSHx of appendectomy, cholecystectomy and ovarian cyst removal presents to ED c/o intermittent episodes of chest pain, described as stabbing and sharp onset 3 days ago - pt high risk for PE therefore did CTA - no PE, ekg wnl sinus rhythm, trop negative, labs within normal, pt stable for dc, and follow up with pmd

## 2019-02-15 NOTE — ED PROVIDER NOTE - OBJECTIVE STATEMENT
24 y/o F with PMHx of hypothyroid (on Synthroid) and PSHx of appendectomy, cholecystectomy and ovarian cyst removal presents to ED c/o intermittent episodes of chest pain, described as stabbing and sharp onset 3 days ago. States her pain is not made exacerbated or alleviated by positional change but feels she occasionally has to "gasp for air" and has had "a ton of chills" recently. Has had a pulmonary embolism in the past, but feels her current symptoms are different from what she was experiencing when she was diagnosed with the PE. Denies fevers, headache, LOC, focal neuro deficits, back pain, palpitations, cough, abdominal pain/n/v/d, urinary symptoms, recent travel and sick contacts. Notes her grandfather passed of an MI at age 45. No further acute complaints at this time.

## 2019-02-16 VITALS
SYSTOLIC BLOOD PRESSURE: 106 MMHG | RESPIRATION RATE: 18 BRPM | TEMPERATURE: 98 F | HEART RATE: 90 BPM | DIASTOLIC BLOOD PRESSURE: 70 MMHG | OXYGEN SATURATION: 98 %

## 2019-02-16 LAB
ANION GAP SERPL CALC-SCNC: 9 MMOL/L — SIGNIFICANT CHANGE UP (ref 5–17)
APTT BLD: 28.6 SEC — SIGNIFICANT CHANGE UP (ref 27.5–36.3)
BUN SERPL-MCNC: 11 MG/DL — SIGNIFICANT CHANGE UP (ref 8–20)
CALCIUM SERPL-MCNC: 9.1 MG/DL — SIGNIFICANT CHANGE UP (ref 8.6–10.2)
CHLORIDE SERPL-SCNC: 103 MMOL/L — SIGNIFICANT CHANGE UP (ref 98–107)
CO2 SERPL-SCNC: 27 MMOL/L — SIGNIFICANT CHANGE UP (ref 22–29)
CREAT SERPL-MCNC: 0.47 MG/DL — LOW (ref 0.5–1.3)
GLUCOSE SERPL-MCNC: 104 MG/DL — SIGNIFICANT CHANGE UP (ref 70–115)
HCG SERPL-ACNC: <4 MIU/ML — SIGNIFICANT CHANGE UP
INR BLD: 1 RATIO — SIGNIFICANT CHANGE UP (ref 0.88–1.16)
POTASSIUM SERPL-MCNC: 4 MMOL/L — SIGNIFICANT CHANGE UP (ref 3.5–5.3)
POTASSIUM SERPL-SCNC: 4 MMOL/L — SIGNIFICANT CHANGE UP (ref 3.5–5.3)
PROTHROM AB SERPL-ACNC: 11.5 SEC — SIGNIFICANT CHANGE UP (ref 10–12.9)
SODIUM SERPL-SCNC: 139 MMOL/L — SIGNIFICANT CHANGE UP (ref 135–145)
TROPONIN T SERPL-MCNC: <0.01 NG/ML — SIGNIFICANT CHANGE UP (ref 0–0.06)

## 2019-02-16 PROCEDURE — 85610 PROTHROMBIN TIME: CPT

## 2019-02-16 PROCEDURE — 71275 CT ANGIOGRAPHY CHEST: CPT | Mod: 26

## 2019-02-16 PROCEDURE — 85027 COMPLETE CBC AUTOMATED: CPT

## 2019-02-16 PROCEDURE — 71275 CT ANGIOGRAPHY CHEST: CPT

## 2019-02-16 PROCEDURE — 93005 ELECTROCARDIOGRAM TRACING: CPT

## 2019-02-16 PROCEDURE — 84702 CHORIONIC GONADOTROPIN TEST: CPT

## 2019-02-16 PROCEDURE — 80048 BASIC METABOLIC PNL TOTAL CA: CPT

## 2019-02-16 PROCEDURE — 99284 EMERGENCY DEPT VISIT MOD MDM: CPT

## 2019-02-16 PROCEDURE — 36415 COLL VENOUS BLD VENIPUNCTURE: CPT

## 2019-02-16 PROCEDURE — 84484 ASSAY OF TROPONIN QUANT: CPT

## 2019-02-16 PROCEDURE — 85730 THROMBOPLASTIN TIME PARTIAL: CPT

## 2019-03-14 ENCOUNTER — APPOINTMENT (OUTPATIENT)
Dept: CARDIOLOGY | Facility: CLINIC | Age: 24
End: 2019-03-14

## 2019-06-27 ENCOUNTER — RESULT REVIEW (OUTPATIENT)
Age: 24
End: 2019-06-27

## 2019-08-10 ENCOUNTER — TRANSCRIPTION ENCOUNTER (OUTPATIENT)
Age: 24
End: 2019-08-10

## 2019-10-23 ENCOUNTER — APPOINTMENT (OUTPATIENT)
Dept: FAMILY MEDICINE | Facility: CLINIC | Age: 24
End: 2019-10-23
Payer: MEDICAID

## 2019-10-23 VITALS
BODY MASS INDEX: 35.48 KG/M2 | WEIGHT: 176 LBS | HEART RATE: 76 BPM | SYSTOLIC BLOOD PRESSURE: 110 MMHG | OXYGEN SATURATION: 98 % | HEIGHT: 59 IN | DIASTOLIC BLOOD PRESSURE: 72 MMHG

## 2019-10-23 DIAGNOSIS — Z13.1 ENCOUNTER FOR SCREENING FOR DIABETES MELLITUS: ICD-10-CM

## 2019-10-23 DIAGNOSIS — Z00.00 ENCOUNTER FOR GENERAL ADULT MEDICAL EXAMINATION W/OUT ABNORMAL FINDINGS: ICD-10-CM

## 2019-10-23 DIAGNOSIS — Z13.21 ENCOUNTER FOR SCREENING FOR NUTRITIONAL DISORDER: ICD-10-CM

## 2019-10-23 DIAGNOSIS — Z13.220 ENCOUNTER FOR SCREENING FOR LIPOID DISORDERS: ICD-10-CM

## 2019-10-23 DIAGNOSIS — Z78.9 OTHER SPECIFIED HEALTH STATUS: ICD-10-CM

## 2019-10-23 DIAGNOSIS — Z83.2 FAMILY HISTORY OF DISEASES OF THE BLOOD AND BLOOD-FORMING ORGANS AND CERTAIN DISORDERS INVOLVING THE IMMUNE MECHANISM: ICD-10-CM

## 2019-10-23 PROCEDURE — 99385 PREV VISIT NEW AGE 18-39: CPT | Mod: 25

## 2019-10-23 PROCEDURE — 36415 COLL VENOUS BLD VENIPUNCTURE: CPT

## 2019-10-23 RX ORDER — CHOLESTYRAMINE 4 G/9G
4 POWDER, FOR SUSPENSION ORAL TWICE DAILY
Qty: 60 | Refills: 5 | Status: DISCONTINUED | COMMUNITY
Start: 2018-01-12 | End: 2019-10-23

## 2019-10-23 NOTE — ASSESSMENT
[FreeTextEntry1] : EKG UTD \par \par \par Cont'd Haem/Onc f/u c Dr. Sanchez q 6M  last f/u 10/14/19.  pt states no evidence hypercoagulable disorder \par \par \par see lab orders

## 2019-10-23 NOTE — HEALTH RISK ASSESSMENT
[Patient reported colonoscopy was normal] : Patient reported colonoscopy was normal [Patient reported PAP Smear was normal] : Patient reported PAP Smear was normal [PapSmearDate] : 06/19 [PapSmearComments] : [facility]  [ColonoscopyDate] : 10/18 [ColonoscopyComments] : repeat at age 49

## 2019-10-23 NOTE — PHYSICAL EXAM
[No Acute Distress] : no acute distress [Well Nourished] : well nourished [PERRL] : pupils equal round and reactive to light [Well-Appearing] : well-appearing [Well Developed] : well developed [Normal Outer Ear/Nose] : the outer ears and nose were normal in appearance [EOMI] : extraocular movements intact [Normal Oropharynx] : the oropharynx was normal [No JVD] : no jugular venous distention [Supple] : supple [No Respiratory Distress] : no respiratory distress  [No Lymphadenopathy] : no lymphadenopathy [Clear to Auscultation] : lungs were clear to auscultation bilaterally [No Accessory Muscle Use] : no accessory muscle use [Normal Rate] : normal rate  [Regular Rhythm] : with a regular rhythm [No Carotid Bruits] : no carotid bruits [Normal S1, S2] : normal S1 and S2 [No Abdominal Bruit] : a ~M bruit was not heard ~T in the abdomen [No Edema] : there was no peripheral edema [No Palpable Aorta] : no palpable aorta [No Extremity Clubbing/Cyanosis] : no extremity clubbing/cyanosis [Normal Appearance] : normal in appearance [No Nipple Discharge] : no nipple discharge [No Axillary Lymphadenopathy] : no axillary lymphadenopathy [Soft] : abdomen soft [Non Tender] : non-tender [No Masses] : no abdominal mass palpated [Non-distended] : non-distended [No HSM] : no HSM [Normal Bowel Sounds] : normal bowel sounds [Normal Posterior Cervical Nodes] : no posterior cervical lymphadenopathy [Normal Anterior Cervical Nodes] : no anterior cervical lymphadenopathy [No CVA Tenderness] : no CVA  tenderness [Grossly Normal Strength/Tone] : grossly normal strength/tone [No Rash] : no rash [Coordination Grossly Intact] : coordination grossly intact [No Focal Deficits] : no focal deficits [Normal Gait] : normal gait [Normal Affect] : the affect was normal [Normal Insight/Judgement] : insight and judgment were intact [Normal Mood] : the mood was normal [de-identified] : large pendulous breast B/L

## 2019-10-23 NOTE — HISTORY OF PRESENT ILLNESS
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   \par no N/V/D +BM bid no bloody/black stools no urinary complaints \par \par Hx of PE during pregnancy at 4M gestation,  treated c SQ Lovenox bid for remainder of pregnancy  at 39 weeks. 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

## 2019-10-23 NOTE — PAST MEDICAL HISTORY
[Menstruating] : menstruating [Menarche Age ____] : age at menarche was [unfilled] [Definite ___ (Date)] : the last menstrual period was [unfilled] [Regular Cycle Intervals] : have been regular [Live Births ___] : P[unfilled]  [Total Preg ___] : G[unfilled] [Full Term ___] : Full Term: [unfilled] [AB Induced ___] : elective abortions: [unfilled]

## 2019-10-24 ENCOUNTER — RX RENEWAL (OUTPATIENT)
Age: 24
End: 2019-10-24

## 2019-10-24 LAB
25(OH)D3 SERPL-MCNC: 16.3 NG/ML
ALBUMIN SERPL ELPH-MCNC: 4.5 G/DL
ALP BLD-CCNC: 63 U/L
ALT SERPL-CCNC: 15 U/L
ANION GAP SERPL CALC-SCNC: 13 MMOL/L
AST SERPL-CCNC: 17 U/L
BASOPHILS # BLD AUTO: 0.04 K/UL
BASOPHILS NFR BLD AUTO: 0.5 %
BILIRUB SERPL-MCNC: 0.3 MG/DL
BUN SERPL-MCNC: 9 MG/DL
CALCIUM SERPL-MCNC: 9.4 MG/DL
CHLORIDE SERPL-SCNC: 102 MMOL/L
CHOLEST SERPL-MCNC: 152 MG/DL
CHOLEST/HDLC SERPL: 3.6 RATIO
CO2 SERPL-SCNC: 24 MMOL/L
CREAT SERPL-MCNC: 0.49 MG/DL
EOSINOPHIL # BLD AUTO: 0.45 K/UL
EOSINOPHIL NFR BLD AUTO: 5.8 %
ESTIMATED AVERAGE GLUCOSE: 105 MG/DL
GLUCOSE SERPL-MCNC: 89 MG/DL
HBA1C MFR BLD HPLC: 5.3 %
HCT VFR BLD CALC: 38.8 %
HDLC SERPL-MCNC: 42 MG/DL
HGB BLD-MCNC: 11.9 G/DL
IMM GRANULOCYTES NFR BLD AUTO: 0.3 %
LDLC SERPL CALC-MCNC: 90 MG/DL
LYMPHOCYTES # BLD AUTO: 2.17 K/UL
LYMPHOCYTES NFR BLD AUTO: 27.9 %
MAN DIFF?: NORMAL
MCHC RBC-ENTMCNC: 27.8 PG
MCHC RBC-ENTMCNC: 30.7 GM/DL
MCV RBC AUTO: 90.7 FL
MONOCYTES # BLD AUTO: 0.56 K/UL
MONOCYTES NFR BLD AUTO: 7.2 %
NEUTROPHILS # BLD AUTO: 4.55 K/UL
NEUTROPHILS NFR BLD AUTO: 58.3 %
PLATELET # BLD AUTO: 312 K/UL
POTASSIUM SERPL-SCNC: 4.3 MMOL/L
PROT SERPL-MCNC: 7.1 G/DL
RBC # BLD: 4.28 M/UL
RBC # FLD: 13.8 %
SODIUM SERPL-SCNC: 139 MMOL/L
T3 SERPL-MCNC: 126 NG/DL
T4 SERPL-MCNC: 6.4 UG/DL
TRIGL SERPL-MCNC: 99 MG/DL
TSH SERPL-ACNC: 12.4 UIU/ML
URATE SERPL-MCNC: 5 MG/DL
WBC # FLD AUTO: 7.79 K/UL

## 2019-10-26 ENCOUNTER — TRANSCRIPTION ENCOUNTER (OUTPATIENT)
Age: 24
End: 2019-10-26

## 2019-11-04 ENCOUNTER — APPOINTMENT (OUTPATIENT)
Dept: FAMILY MEDICINE | Facility: CLINIC | Age: 24
End: 2019-11-04
Payer: MEDICAID

## 2019-11-04 VITALS — HEART RATE: 72 BPM | SYSTOLIC BLOOD PRESSURE: 110 MMHG | DIASTOLIC BLOOD PRESSURE: 74 MMHG

## 2019-11-04 DIAGNOSIS — R59.0 LOCALIZED ENLARGED LYMPH NODES: ICD-10-CM

## 2019-11-04 DIAGNOSIS — R19.5 OTHER FECAL ABNORMALITIES: ICD-10-CM

## 2019-11-04 DIAGNOSIS — R91.1 SOLITARY PULMONARY NODULE: ICD-10-CM

## 2019-11-04 DIAGNOSIS — K52.9 NONINFECTIVE GASTROENTERITIS AND COLITIS, UNSPECIFIED: ICD-10-CM

## 2019-11-04 PROCEDURE — 99214 OFFICE O/P EST MOD 30 MIN: CPT

## 2019-11-04 RX ORDER — HYDROCORTISONE 25 MG/G
2.5 CREAM TOPICAL 3 TIMES DAILY
Qty: 30 | Refills: 5 | Status: COMPLETED | COMMUNITY
Start: 2018-08-06 | End: 2019-11-04

## 2019-11-04 NOTE — PHYSICAL EXAM
[No Acute Distress] : no acute distress [Well Nourished] : well nourished [Well Developed] : well developed [Well-Appearing] : well-appearing [EOMI] : extraocular movements intact [Normal Outer Ear/Nose] : the outer ears and nose were normal in appearance [No JVD] : no jugular venous distention [No Respiratory Distress] : no respiratory distress  [No Accessory Muscle Use] : no accessory muscle use [Normal Rate] : normal rate  [Regular Rhythm] : with a regular rhythm [Normal S1, S2] : normal S1 and S2 [No Edema] : there was no peripheral edema [Soft] : abdomen soft [No HSM] : no HSM [Normal Bowel Sounds] : normal bowel sounds [Normal Posterior Cervical Nodes] : no posterior cervical lymphadenopathy [Normal Anterior Cervical Nodes] : no anterior cervical lymphadenopathy [No CVA Tenderness] : no CVA  tenderness [Grossly Normal Strength/Tone] : grossly normal strength/tone [No Rash] : no rash [Coordination Grossly Intact] : coordination grossly intact [No Focal Deficits] : no focal deficits [Normal Gait] : normal gait [Normal Affect] : the affect was normal [Normal Mood] : the mood was normal [Normal Insight/Judgement] : insight and judgment were intact [de-identified] : mild generalized abdominal discomfort

## 2019-11-04 NOTE — HISTORY OF PRESENT ILLNESS
ED Flu Like





- General


Chief Complaint: Flu Symptoms


Stated Complaint: FLU LIKE SYMPTOMS


Time Seen by Provider: 01/10/18 12:39


Notes: 





flu like symptoms x 2 days


TRAVEL OUTSIDE OF THE [place] IN LAST 30 DAYS: No





- HPI


Onset: Yesterday


Timing/Duration: Sudden


Quality of pain: Achy


CO exposure: No


Associated symptoms: Body/muscle aches, Chills, Nonproductive cough, Fever


Similar symptoms previously: No





- Related Data


Allergies/Adverse Reactions: 


 





No Known Allergies Allergy (Verified 01/10/18 11:36)


 











Past Medical History





- General


Information source: Patient





- Social History


Smoking Status: Current Every Day Smoker


Chew tobacco use (# tins/day): No


Frequency of alcohol use: None


Drug Abuse: None


Lives with: Family


Family History: Reviewed & Not Pertinent


Patient has suicidal ideation: No


Patient has homicidal ideation: No





- Medical History


Medical History: Negative


Renal/ Medical History: Reports: Hx Ovarian Cysts.  Denies: Hx Peritoneal 

Dialysis


Past Surgical History: Reports: Hx  Section





- Immunizations


Immunizations up to date: Yes


Hx Diphtheria, Pertussis, Tetanus Vaccination: No





Review of Systems





- Review of Systems


Constitutional: No symptoms reported


EENT: No symptoms reported


Cardiovascular: No symptoms reported


Respiratory: No symptoms reported


Gastrointestinal: No symptoms reported


Genitourinary: No symptoms reported


Female Genitourinary: No symptoms reported


Musculoskeletal: No symptoms reported


Skin: No symptoms reported


Hematologic/Lymphatic: No symptoms reported


Neurological/Psychological: No symptoms reported





Physical Exam





- Vital signs


Interpretation: Tachycardic, Febrile





- General


General appearance: Alert


In distress: None - mildly ill looking





- HEENT


Head: Normocephalic, Atraumatic


Eyes: Normal


Pupils: PERRL





- Respiratory


Respiratory status: No respiratory distress


Chest status: Nontender


Breath sounds: Normal


Chest palpation: Normal





- Cardiovascular


Rhythm: Regular


Heart sounds: Normal auscultation


Murmur: No





- Abdominal


Inspection: Normal


Distension: No distension


Bowel sounds: Normal


Tenderness: Nontender


Organomegaly: No organomegaly





- Back


Back: Normal, Nontender





- Extremities


General upper extremity: Normal inspection, Nontender, Normal color, Normal ROM

, Normal temperature


General lower extremity: Normal inspection, Nontender, Normal color, Normal ROM

, Normal temperature, Normal weight bearing.  No: Ashanti's sign





- Neurological


Neuro grossly intact: Yes


Cognition: Normal


Orientation: AAOx4


Carol Coma Scale Eye Opening: Spontaneous


Carol Coma Scale Verbal: Oriented


Carol Coma Scale Motor: Obeys Commands


[place] Coma Scale Total: 15


Speech: Normal


Motor strength normal: LUE, RUE, LLE, RLE


Sensory: Normal





- Psychological


Associated symptoms: Normal affect, Normal mood





- Skin


Skin Temperature: Warm


Skin Moisture: Dry


Skin Color: Normal





Course





- Re-evaluation


Re-evalutation: 





01/10/18 12:51


H&P c/w flu like illness.  no co-morbidities.  no s/s sepsis.  no respirtory 

distress.  pt stable for discharge





Discharge





- Discharge


Clinical Impression: 


 Flu-like symptoms





Condition: Stable


Disposition: HOME, SELF-CARE


Instructions:  Influenza ([facility]) [id number], Acetaminophen


Additional Instructions: 


rest and hydrate


social isolation while sick


good hand hygiene


tamiflu as prescribed


follow up with primary care if symptoms persist


return to ER for any worsening


Prescriptions: 


Oseltamivir Phosphate [Tamiflu] 75 mg PO BID #10 capsule


Forms:  Return to Work [FreeTextEntry1] : pt went to [facility] ER on 10/28/2019 x abd pain c diarrhea pt was not admitted, pt was told that she has enlarged mesenteric lymph nodes and nodule in lung  [de-identified] : 23 yo female presents to office s/p [facility] ED eval on 10/28/19.   pt states abdominal cramping started 3 days prior,  followed by multiple episodes of diarrhea, +ve nausea no vomiting   +fever on Saturday.   Diarrhea persisted for the next 3 days.  Denies blood stools +ve black stools, but admits to Pepto Bismol intake.  \par \par \par pt reports resolved diarrhea, Last BM yesterday mildly loose but improved in consistency  denies mucoid/bloody stools \par resolved  black stools \par appetite still decreased \par PO fluid intake is not impaired

## 2019-11-04 NOTE — ASSESSMENT
[FreeTextEntry1] : see radiology orders for dedicated Chest CT \par \par repeat CT Abd/Pelvis c contrast in 3M document resolution of probable reactive mesenteric LAD \par \par BRAT diet \par NO dairy x 1 week \par aggressive hydration!!!!!

## 2019-11-14 PROBLEM — E04.1 THYROID NODULE: Status: ACTIVE | Noted: 2019-11-14

## 2019-11-22 ENCOUNTER — APPOINTMENT (OUTPATIENT)
Dept: GASTROENTEROLOGY | Facility: CLINIC | Age: 24
End: 2019-11-22

## 2019-12-01 ENCOUNTER — FORM ENCOUNTER (OUTPATIENT)
Age: 24
End: 2019-12-01

## 2019-12-02 ENCOUNTER — APPOINTMENT (OUTPATIENT)
Dept: FAMILY MEDICINE | Facility: CLINIC | Age: 24
End: 2019-12-02

## 2019-12-02 ENCOUNTER — APPOINTMENT (OUTPATIENT)
Dept: ULTRASOUND IMAGING | Facility: CLINIC | Age: 24
End: 2019-12-02
Payer: MEDICAID

## 2019-12-02 ENCOUNTER — OUTPATIENT (OUTPATIENT)
Dept: OUTPATIENT SERVICES | Facility: HOSPITAL | Age: 24
LOS: 1 days | End: 2019-12-02
Payer: MEDICAID

## 2019-12-02 DIAGNOSIS — E04.1 NONTOXIC SINGLE THYROID NODULE: ICD-10-CM

## 2019-12-02 DIAGNOSIS — Z98.890 OTHER SPECIFIED POSTPROCEDURAL STATES: Chronic | ICD-10-CM

## 2019-12-02 DIAGNOSIS — Z90.49 ACQUIRED ABSENCE OF OTHER SPECIFIED PARTS OF DIGESTIVE TRACT: Chronic | ICD-10-CM

## 2019-12-02 DIAGNOSIS — Z98.89 OTHER SPECIFIED POSTPROCEDURAL STATES: Chronic | ICD-10-CM

## 2019-12-02 PROCEDURE — 76536 US EXAM OF HEAD AND NECK: CPT | Mod: 26

## 2019-12-02 PROCEDURE — 76536 US EXAM OF HEAD AND NECK: CPT

## 2020-01-04 ENCOUNTER — APPOINTMENT (OUTPATIENT)
Dept: CT IMAGING | Facility: CLINIC | Age: 25
End: 2020-01-04

## 2020-01-11 ENCOUNTER — APPOINTMENT (OUTPATIENT)
Dept: CT IMAGING | Facility: CLINIC | Age: 25
End: 2020-01-11

## 2020-01-23 ENCOUNTER — APPOINTMENT (OUTPATIENT)
Dept: FAMILY MEDICINE | Facility: CLINIC | Age: 25
End: 2020-01-23
Payer: MEDICAID

## 2020-01-23 VITALS
HEIGHT: 59 IN | DIASTOLIC BLOOD PRESSURE: 72 MMHG | BODY MASS INDEX: 36.49 KG/M2 | HEART RATE: 79 BPM | OXYGEN SATURATION: 98 % | SYSTOLIC BLOOD PRESSURE: 114 MMHG | WEIGHT: 181 LBS

## 2020-01-23 PROCEDURE — 99214 OFFICE O/P EST MOD 30 MIN: CPT | Mod: 25

## 2020-01-23 PROCEDURE — 36415 COLL VENOUS BLD VENIPUNCTURE: CPT

## 2020-01-23 NOTE — HISTORY OF PRESENT ILLNESS
[FreeTextEntry1] : follow up on hypothyroidism  [de-identified] : 23 yo female for f/u on hypothyroidism.  Pt advised as per last visit to take 112mcg of Levothyroxine, however, pt was taking  150mcg daily.  States felt "Great"    Denies heat intolerance, loose stools, anxiety, hair loss, and/or palpitations. \par \par pt states has been OUT of meds x 1M

## 2020-01-24 LAB
T3 SERPL-MCNC: 135 NG/DL
T4 SERPL-MCNC: 5.7 UG/DL
TSH SERPL-ACNC: 20.9 UIU/ML

## 2020-05-14 ENCOUNTER — APPOINTMENT (OUTPATIENT)
Dept: FAMILY MEDICINE | Facility: CLINIC | Age: 25
End: 2020-05-14
Payer: MEDICAID

## 2020-05-14 VITALS
WEIGHT: 183 LBS | HEART RATE: 77 BPM | DIASTOLIC BLOOD PRESSURE: 76 MMHG | BODY MASS INDEX: 36.89 KG/M2 | TEMPERATURE: 98.2 F | OXYGEN SATURATION: 98 % | HEIGHT: 59 IN | SYSTOLIC BLOOD PRESSURE: 122 MMHG

## 2020-05-14 DIAGNOSIS — Z20.828 CONTACT WITH AND (SUSPECTED) EXPOSURE TO OTHER VIRAL COMMUNICABLE DISEASES: ICD-10-CM

## 2020-05-14 PROCEDURE — 99214 OFFICE O/P EST MOD 30 MIN: CPT | Mod: 25

## 2020-05-14 PROCEDURE — 36415 COLL VENOUS BLD VENIPUNCTURE: CPT

## 2020-05-14 NOTE — PHYSICAL EXAM
[Well Nourished] : well nourished [No Acute Distress] : no acute distress [Well Developed] : well developed [Well-Appearing] : well-appearing [EOMI] : extraocular movements intact [Normal Outer Ear/Nose] : the outer ears and nose were normal in appearance [No JVD] : no jugular venous distention [No Respiratory Distress] : no respiratory distress  [No Accessory Muscle Use] : no accessory muscle use [Clear to Auscultation] : lungs were clear to auscultation bilaterally [Normal S1, S2] : normal S1 and S2 [Regular Rhythm] : with a regular rhythm [Normal Rate] : normal rate  [No Carotid Bruits] : no carotid bruits [No Edema] : there was no peripheral edema [Non-distended] : non-distended [No CVA Tenderness] : no CVA  tenderness [Grossly Normal Strength/Tone] : grossly normal strength/tone [Coordination Grossly Intact] : coordination grossly intact [No Rash] : no rash [Normal Gait] : normal gait [No Focal Deficits] : no focal deficits [Normal Affect] : the affect was normal [Normal Mood] : the mood was normal [Normal Insight/Judgement] : insight and judgment were intact

## 2020-05-14 NOTE — HISTORY OF PRESENT ILLNESS
[FreeTextEntry1] : f/u x thyroid  [de-identified] : 25 yo female for f/u on elevated TSH.  pt is s/p GYN consult c Dr. Genao ( [place], [place])  5/4/20  Dx'ed c Insulin resistance and started on Meformin 500mg qd c dinner.  Pt reports improving energy and 3lb wt loss thus far.    \par \par pt reports compliance c daily LT4 150mcg qd  \par \par no f/c/s, no cough, no loose stools, intact sense of smell/taste \par

## 2020-05-20 LAB
ALBUMIN SERPL ELPH-MCNC: 4.6 G/DL
ALP BLD-CCNC: 61 U/L
ALT SERPL-CCNC: 23 U/L
ANION GAP SERPL CALC-SCNC: 15 MMOL/L
AST SERPL-CCNC: 25 U/L
BASOPHILS # BLD AUTO: 0.04 K/UL
BASOPHILS NFR BLD AUTO: 0.6 %
BILIRUB SERPL-MCNC: 0.5 MG/DL
BUN SERPL-MCNC: 11 MG/DL
CALCIUM SERPL-MCNC: 9.7 MG/DL
CHLORIDE SERPL-SCNC: 100 MMOL/L
CO2 SERPL-SCNC: 24 MMOL/L
CREAT SERPL-MCNC: 0.52 MG/DL
EOSINOPHIL # BLD AUTO: 0.19 K/UL
EOSINOPHIL NFR BLD AUTO: 2.7 %
ESTIMATED AVERAGE GLUCOSE: 111 MG/DL
GLUCOSE SERPL-MCNC: 85 MG/DL
HBA1C MFR BLD HPLC: 5.5 %
HCT VFR BLD CALC: 37.4 %
HGB BLD-MCNC: 11.7 G/DL
IMM GRANULOCYTES NFR BLD AUTO: 0.3 %
LYMPHOCYTES # BLD AUTO: 1.79 K/UL
LYMPHOCYTES NFR BLD AUTO: 25.1 %
MAN DIFF?: NORMAL
MCHC RBC-ENTMCNC: 27.9 PG
MCHC RBC-ENTMCNC: 31.3 GM/DL
MCV RBC AUTO: 89 FL
MONOCYTES # BLD AUTO: 0.63 K/UL
MONOCYTES NFR BLD AUTO: 8.8 %
NEUTROPHILS # BLD AUTO: 4.47 K/UL
NEUTROPHILS NFR BLD AUTO: 62.5 %
PLATELET # BLD AUTO: 299 K/UL
POTASSIUM SERPL-SCNC: 4.5 MMOL/L
PROT SERPL-MCNC: 7.1 G/DL
RBC # BLD: 4.2 M/UL
RBC # FLD: 13.5 %
SARS-COV-2 IGG SERPL IA-ACNC: <3.8 AU/ML
SARS-COV-2 IGG SERPL QL IA: NEGATIVE
SODIUM SERPL-SCNC: 139 MMOL/L
T4 SERPL-MCNC: 9 UG/DL
TSH SERPL-ACNC: 8.49 UIU/ML
WBC # FLD AUTO: 7.14 K/UL

## 2020-05-20 NOTE — ED PROVIDER NOTE - CROS ED EYES ALL NEG
Cont Xeloda    Please send us a picture of the foot as I suspect an infection and you might need antibiotics.     RTC in 3 weeks to see Felipa - virtual visit     See me or Felipa in 6 weeks   - - -

## 2020-08-14 ENCOUNTER — LABORATORY RESULT (OUTPATIENT)
Age: 25
End: 2020-08-14

## 2020-08-14 ENCOUNTER — NON-APPOINTMENT (OUTPATIENT)
Age: 25
End: 2020-08-14

## 2020-08-14 ENCOUNTER — APPOINTMENT (OUTPATIENT)
Dept: FAMILY MEDICINE | Facility: CLINIC | Age: 25
End: 2020-08-14
Payer: MEDICAID

## 2020-08-14 VITALS
SYSTOLIC BLOOD PRESSURE: 118 MMHG | OXYGEN SATURATION: 98 % | BODY MASS INDEX: 36.89 KG/M2 | TEMPERATURE: 98.5 F | WEIGHT: 183 LBS | HEIGHT: 59 IN | HEART RATE: 80 BPM | DIASTOLIC BLOOD PRESSURE: 70 MMHG

## 2020-08-14 DIAGNOSIS — R11.10 VOMITING, UNSPECIFIED: ICD-10-CM

## 2020-08-14 DIAGNOSIS — R00.2 PALPITATIONS: ICD-10-CM

## 2020-08-14 PROCEDURE — 93000 ELECTROCARDIOGRAM COMPLETE: CPT

## 2020-08-14 PROCEDURE — 36415 COLL VENOUS BLD VENIPUNCTURE: CPT

## 2020-08-14 PROCEDURE — 99214 OFFICE O/P EST MOD 30 MIN: CPT | Mod: 25

## 2020-08-14 RX ORDER — LEVOTHYROXINE SODIUM 0.15 MG/1
150 TABLET ORAL
Qty: 30 | Refills: 3 | Status: DISCONTINUED | COMMUNITY
Start: 2017-08-10 | End: 2020-08-14

## 2020-08-18 LAB
ALBUMIN SERPL ELPH-MCNC: 4.7 G/DL
ALP BLD-CCNC: 63 U/L
ALT SERPL-CCNC: 32 U/L
ANION GAP SERPL CALC-SCNC: 12 MMOL/L
AST SERPL-CCNC: 22 U/L
BASOPHILS # BLD AUTO: 0.05 K/UL
BASOPHILS NFR BLD AUTO: 0.6 %
BILIRUB SERPL-MCNC: 0.3 MG/DL
BUN SERPL-MCNC: 10 MG/DL
CALCIUM SERPL-MCNC: 9.8 MG/DL
CHLORIDE SERPL-SCNC: 98 MMOL/L
CO2 SERPL-SCNC: 26 MMOL/L
CREAT SERPL-MCNC: 0.47 MG/DL
EOSINOPHIL # BLD AUTO: 0.18 K/UL
EOSINOPHIL NFR BLD AUTO: 2.1 %
ESTIMATED AVERAGE GLUCOSE: 111 MG/DL
FERRITIN SERPL-MCNC: 38 NG/ML
FOLATE SERPL-MCNC: >20 NG/ML
GLUCOSE SERPL-MCNC: 82 MG/DL
HBA1C MFR BLD HPLC: 5.5 %
HCT VFR BLD CALC: 39.9 %
HGB BLD-MCNC: 12.3 G/DL
IMM GRANULOCYTES NFR BLD AUTO: 0.3 %
IRON SATN MFR SERPL: 16 %
IRON SERPL-MCNC: 53 UG/DL
LPL SERPL-CCNC: 23 U/L
LYMPHOCYTES # BLD AUTO: 1.98 K/UL
LYMPHOCYTES NFR BLD AUTO: 22.7 %
MAN DIFF?: NORMAL
MCHC RBC-ENTMCNC: 26.7 PG
MCHC RBC-ENTMCNC: 30.8 GM/DL
MCV RBC AUTO: 86.7 FL
MONOCYTES # BLD AUTO: 0.76 K/UL
MONOCYTES NFR BLD AUTO: 8.7 %
NEUTROPHILS # BLD AUTO: 5.73 K/UL
NEUTROPHILS NFR BLD AUTO: 65.6 %
PLATELET # BLD AUTO: 348 K/UL
POTASSIUM SERPL-SCNC: 4.9 MMOL/L
PROT SERPL-MCNC: 7.4 G/DL
RBC # BLD: 4.6 M/UL
RBC # FLD: 12.9 %
SODIUM SERPL-SCNC: 137 MMOL/L
T3 SERPL-MCNC: 98 NG/DL
T3FREE SERPL-MCNC: 2.45 PG/ML
T3RU NFR SERPL: 1.1 TBI
T4 FREE SERPL-MCNC: 1.2 NG/DL
T4 SERPL-MCNC: 7.5 UG/DL
THYROGLOB AB SERPL-ACNC: 38.4 IU/ML
THYROPEROXIDASE AB SERPL IA-ACNC: 553 IU/ML
TIBC SERPL-MCNC: 325 UG/DL
TSH SERPL-ACNC: 1.31 UIU/ML
TSI ACT/NOR SER: <0.1 IU/L
UIBC SERPL-MCNC: 272 UG/DL
VIT B12 SERPL-MCNC: 362 PG/ML
WBC # FLD AUTO: 8.73 K/UL

## 2020-08-20 ENCOUNTER — NON-APPOINTMENT (OUTPATIENT)
Age: 25
End: 2020-08-20

## 2020-08-20 ENCOUNTER — APPOINTMENT (OUTPATIENT)
Dept: CARDIOLOGY | Facility: CLINIC | Age: 25
End: 2020-08-20
Payer: MEDICAID

## 2020-08-20 VITALS
HEART RATE: 77 BPM | BODY MASS INDEX: 36.89 KG/M2 | HEIGHT: 59 IN | OXYGEN SATURATION: 99 % | WEIGHT: 183 LBS | DIASTOLIC BLOOD PRESSURE: 70 MMHG | SYSTOLIC BLOOD PRESSURE: 110 MMHG

## 2020-08-20 DIAGNOSIS — R11.0 NAUSEA: ICD-10-CM

## 2020-08-20 DIAGNOSIS — I65.29 OCCLUSION AND STENOSIS OF UNSPECIFIED CAROTID ARTERY: ICD-10-CM

## 2020-08-20 DIAGNOSIS — R42 DIZZINESS AND GIDDINESS: ICD-10-CM

## 2020-08-20 PROCEDURE — 99204 OFFICE O/P NEW MOD 45 MIN: CPT | Mod: 25

## 2020-08-20 PROCEDURE — 93000 ELECTROCARDIOGRAM COMPLETE: CPT

## 2020-08-20 RX ORDER — METFORMIN HYDROCHLORIDE 500 MG/1
500 TABLET, COATED ORAL
Qty: 30 | Refills: 2 | Status: DISCONTINUED | COMMUNITY
Start: 2020-05-14 | End: 2020-08-20

## 2020-08-20 NOTE — HISTORY OF PRESENT ILLNESS
[FreeTextEntry1] : Pt is a 26 y/o F who is referred here today by their PCP for evaluation.  Last week felt dizzy, nausea and was diagnosed yeast infection.  She was still not feeling well and went to see Dr Browning and ECG was changed.  She continued with symptoms including chest heaviness, palpitations.  She went to ED at [facility] and was told everything was ok and was discharged.\par \par PMH: PE during pregnancy\par Smoking status: never\par no ETOH\par no drug use\par Current exercise: none\par Daily water intake: 1-2 cups \par Daily caffeine intake: rare\par OTC medications: none\par Family hx: mother "heart problems"\par Previous cardiac testing: none\par Previous hospitalizations: none\par

## 2020-08-20 NOTE — PHYSICAL EXAM
[General Appearance - Well Developed] : well developed [Well Groomed] : well groomed [Normal Appearance] : normal appearance [General Appearance - Well Nourished] : well nourished [General Appearance - In No Acute Distress] : no acute distress [No Deformities] : no deformities [Normal Oral Mucosa] : normal oral mucosa [Eyelids - No Xanthelasma] : the eyelids demonstrated no xanthelasmas [Normal Conjunctiva] : the conjunctiva exhibited no abnormalities [No Oral Pallor] : no oral pallor [No Oral Cyanosis] : no oral cyanosis [Heart Rate And Rhythm] : heart rate and rhythm were normal [Heart Sounds] : normal S1 and S2 [Arterial Pulses Normal] : the arterial pulses were normal [Murmurs] : no murmurs present [Respiration, Rhythm And Depth] : normal respiratory rhythm and effort [Exaggerated Use Of Accessory Muscles For Inspiration] : no accessory muscle use [Edema] : no peripheral edema present [Auscultation Breath Sounds / Voice Sounds] : lungs were clear to auscultation bilaterally [Abdomen Soft] : soft [Abdomen Tenderness] : non-tender [Abdomen Mass (___ Cm)] : no abdominal mass palpated [Gait - Sufficient For Exercise Testing] : the gait was sufficient for exercise testing [Abnormal Walk] : normal gait [Cyanosis, Localized] : no localized cyanosis [Petechial Hemorrhages (___cm)] : no petechial hemorrhages [Nail Clubbing] : no clubbing of the fingernails [] : no ischemic changes [Oriented To Time, Place, And Person] : oriented to person, place, and time [Mood] : the mood was normal [Impaired Insight] : insight and judgment were intact [Affect] : the affect was normal [No Anxiety] : not feeling anxious [FreeTextEntry1] : no JVD or bruits

## 2020-08-20 NOTE — DISCUSSION/SUMMARY
[FreeTextEntry1] : Pt is a 24 y/o F with dizziness, nausea, chest discomfort\par Will check transthoracic echocardiogram to evaluate left ventricular function and assess for any structural abnormalities\par check forbes monitor \par check CUS\par Consider stress testing if symptoms persist and pending above results\par The described plan was discussed with the pt.  All questions and concerns were addressed to the best of my knowledge.

## 2020-08-21 PROBLEM — I65.29 CAROTID STENOSIS: Status: ACTIVE | Noted: 2020-08-21

## 2020-08-26 ENCOUNTER — APPOINTMENT (OUTPATIENT)
Dept: FAMILY MEDICINE | Facility: CLINIC | Age: 25
End: 2020-08-26
Payer: MEDICAID

## 2020-08-26 VITALS
DIASTOLIC BLOOD PRESSURE: 70 MMHG | HEART RATE: 65 BPM | HEIGHT: 59 IN | BODY MASS INDEX: 37.29 KG/M2 | TEMPERATURE: 98.4 F | WEIGHT: 185 LBS | SYSTOLIC BLOOD PRESSURE: 112 MMHG | OXYGEN SATURATION: 98 %

## 2020-08-26 DIAGNOSIS — R42 DIZZINESS AND GIDDINESS: ICD-10-CM

## 2020-08-26 PROCEDURE — 99214 OFFICE O/P EST MOD 30 MIN: CPT

## 2020-08-26 NOTE — ASSESSMENT
[FreeTextEntry1] : complete cardiac w/u,    increase hydration from 3 btles of water per day to 5 bottles/day \par \par Trial of OTC B12 1000 mcg qd \par \par f/u 1M

## 2020-08-26 NOTE — HISTORY OF PRESENT ILLNESS
[FreeTextEntry1] : follow up on Hypothyroidism  [de-identified] : 24 yo female for f/u on TFTs.  Most recent TSH level now therapeutic at 1.31, pt currently on Lt4 175 mcg qd.  pt is s/p Cardio consult for lightheadedness/dizziness.  pt denies worsened Sx and Sx c exertion.  pt states symptoms have been improved , however, are still persistent.   Pt scheduled for 2D-Echo, 24 Hr Holter, CUS c Dr. Madden (Cardiology).

## 2020-08-26 NOTE — PHYSICAL EXAM
[No Acute Distress] : no acute distress [Well-Appearing] : well-appearing [Well Developed] : well developed [Well Nourished] : well nourished [Normal Outer Ear/Nose] : the outer ears and nose were normal in appearance [No JVD] : no jugular venous distention [EOMI] : extraocular movements intact [No Respiratory Distress] : no respiratory distress  [No Accessory Muscle Use] : no accessory muscle use [Clear to Auscultation] : lungs were clear to auscultation bilaterally [Normal Rate] : normal rate  [Normal S1, S2] : normal S1 and S2 [No Carotid Bruits] : no carotid bruits [Regular Rhythm] : with a regular rhythm [Non-distended] : non-distended [No CVA Tenderness] : no CVA  tenderness [No Edema] : there was no peripheral edema [Coordination Grossly Intact] : coordination grossly intact [No Rash] : no rash [Grossly Normal Strength/Tone] : grossly normal strength/tone [No Focal Deficits] : no focal deficits [Normal Affect] : the affect was normal [Normal Gait] : normal gait [Normal Insight/Judgement] : insight and judgment were intact [Normal Mood] : the mood was normal

## 2020-09-28 ENCOUNTER — APPOINTMENT (OUTPATIENT)
Dept: FAMILY MEDICINE | Facility: CLINIC | Age: 25
End: 2020-09-28

## 2020-10-06 ENCOUNTER — APPOINTMENT (OUTPATIENT)
Dept: CARDIOLOGY | Facility: CLINIC | Age: 25
End: 2020-10-06

## 2020-10-16 ENCOUNTER — APPOINTMENT (OUTPATIENT)
Dept: CARDIOLOGY | Facility: CLINIC | Age: 25
End: 2020-10-16
Payer: MEDICAID

## 2020-10-16 DIAGNOSIS — Z23 ENCOUNTER FOR IMMUNIZATION: ICD-10-CM

## 2020-10-16 PROCEDURE — 0296T: CPT

## 2020-10-16 PROCEDURE — 93306 TTE W/DOPPLER COMPLETE: CPT

## 2020-10-16 PROCEDURE — 90686 IIV4 VACC NO PRSV 0.5 ML IM: CPT

## 2020-10-16 PROCEDURE — G0008: CPT

## 2020-10-30 ENCOUNTER — APPOINTMENT (OUTPATIENT)
Dept: FAMILY MEDICINE | Facility: CLINIC | Age: 25
End: 2020-10-30

## 2020-10-30 PROCEDURE — 0298T: CPT

## 2020-11-17 ENCOUNTER — APPOINTMENT (OUTPATIENT)
Dept: FAMILY MEDICINE | Facility: CLINIC | Age: 25
End: 2020-11-17

## 2020-11-17 ENCOUNTER — APPOINTMENT (OUTPATIENT)
Dept: CARDIOLOGY | Facility: CLINIC | Age: 25
End: 2020-11-17

## 2020-12-04 ENCOUNTER — TRANSCRIPTION ENCOUNTER (OUTPATIENT)
Age: 25
End: 2020-12-04

## 2020-12-11 ENCOUNTER — APPOINTMENT (OUTPATIENT)
Dept: FAMILY MEDICINE | Facility: CLINIC | Age: 25
End: 2020-12-11
Payer: MEDICAID

## 2020-12-11 ENCOUNTER — APPOINTMENT (OUTPATIENT)
Dept: FAMILY MEDICINE | Facility: CLINIC | Age: 25
End: 2020-12-11

## 2020-12-11 DIAGNOSIS — R05 COUGH: ICD-10-CM

## 2020-12-11 PROCEDURE — 99214 OFFICE O/P EST MOD 30 MIN: CPT | Mod: 95

## 2020-12-11 NOTE — PHYSICAL EXAM
[No Acute Distress] : no acute distress [Well Nourished] : well nourished [Well Developed] : well developed [Well-Appearing] : well-appearing [EOMI] : extraocular movements intact [Normal Outer Ear/Nose] : the outer ears and nose were normal in appearance [No Respiratory Distress] : no respiratory distress  [No Accessory Muscle Use] : no accessory muscle use [Grossly Normal Strength/Tone] : grossly normal strength/tone [Coordination Grossly Intact] : coordination grossly intact [Normal Affect] : the affect was normal [Normal Mood] : the mood was normal [Normal Insight/Judgement] : insight and judgment were intact

## 2020-12-11 NOTE — REVIEW OF SYSTEMS
[Chills] : chills [Fatigue] : fatigue [Cough] : cough [Negative] : Heme/Lymph [FreeTextEntry2] : see  HPI  [FreeTextEntry6] : see HPI

## 2020-12-11 NOTE — PLAN
[FreeTextEntry1] : close monitoring!!!!!! aggressive hydration!!!!! see med orders.  OTC Tylenol 500-100mg q6-8h prn if fever develops and/or body aches  \par self quarantine x 14d \par \par instructed pt to pay careful attention at Day 5-8 (currently on Day 7) given risk of decompensation

## 2020-12-11 NOTE — HISTORY OF PRESENT ILLNESS
[Home] : at home, [unfilled] , at the time of the visit. [Medical Office: ([facility])___] : at the medical office located in  [Verbal consent obtained from patient] : the patient, [unfilled] [FreeTextEntry8] : 24 yo female c 1 week hx of H/A,  followed by loss of smell/taste.    +ve COVID-19 test on 12/8/20  (rapid test performed \par [facility]).     Denies fever, +ve chills +ve sweats   +ve cough c  y/g phlegm  sense of smell/taste improving \par resolved body aches,   intermittent H/A, denies  disruption of sleep.  +ve intermittent diarrhea    \par \par +ve quarantine for entire family of 4 until 12/20/20.   \par \par

## 2021-01-15 ENCOUNTER — RESULT REVIEW (OUTPATIENT)
Age: 26
End: 2021-01-15

## 2021-01-18 ENCOUNTER — APPOINTMENT (OUTPATIENT)
Dept: FAMILY MEDICINE | Facility: CLINIC | Age: 26
End: 2021-01-18
Payer: MEDICAID

## 2021-01-18 VITALS
OXYGEN SATURATION: 98 % | SYSTOLIC BLOOD PRESSURE: 120 MMHG | DIASTOLIC BLOOD PRESSURE: 80 MMHG | HEIGHT: 59 IN | HEART RATE: 65 BPM | BODY MASS INDEX: 37.09 KG/M2 | TEMPERATURE: 98.1 F | WEIGHT: 184 LBS

## 2021-01-18 DIAGNOSIS — U07.1 COVID-19: ICD-10-CM

## 2021-01-18 PROCEDURE — 99214 OFFICE O/P EST MOD 30 MIN: CPT | Mod: 25

## 2021-01-18 PROCEDURE — 99072 ADDL SUPL MATRL&STAF TM PHE: CPT

## 2021-01-18 RX ORDER — AMOXICILLIN 875 MG/1
875 TABLET, FILM COATED ORAL
Qty: 14 | Refills: 0 | Status: COMPLETED | COMMUNITY
Start: 2020-09-08

## 2021-01-18 RX ORDER — AMOXICILLIN 500 MG/1
500 CAPSULE ORAL
Qty: 21 | Refills: 0 | Status: COMPLETED | COMMUNITY
Start: 2020-09-15

## 2021-01-18 RX ORDER — ACETAMINOPHEN AND CODEINE 300; 30 MG/1; MG/1
300-30 TABLET ORAL
Qty: 18 | Refills: 0 | Status: COMPLETED | COMMUNITY
Start: 2020-09-15

## 2021-01-18 RX ORDER — CLOTRIMAZOLE AND BETAMETHASONE DIPROPIONATE 10; .5 MG/G; MG/G
1-0.05 CREAM TOPICAL
Qty: 45 | Refills: 0 | Status: COMPLETED | COMMUNITY
Start: 2020-08-11

## 2021-01-18 RX ORDER — AZITHROMYCIN 250 MG/1
250 TABLET, FILM COATED ORAL
Qty: 1 | Refills: 0 | Status: COMPLETED | COMMUNITY
Start: 2020-12-11 | End: 2021-01-18

## 2021-01-18 RX ORDER — FLUCONAZOLE 150 MG/1
150 TABLET ORAL
Qty: 2 | Refills: 0 | Status: COMPLETED | COMMUNITY
Start: 2020-08-11

## 2021-01-18 RX ORDER — MONTELUKAST 10 MG/1
10 TABLET, FILM COATED ORAL
Qty: 30 | Refills: 0 | Status: COMPLETED | COMMUNITY
Start: 2020-12-10 | End: 2021-01-18

## 2021-01-18 RX ORDER — CHLORHEXIDINE GLUCONATE, 0.12% ORAL RINSE 1.2 MG/ML
0.12 SOLUTION DENTAL
Qty: 473 | Refills: 0 | Status: COMPLETED | COMMUNITY
Start: 2020-09-08

## 2021-01-18 NOTE — PLAN
[FreeTextEntry1] : see lab orders \par \par encouraged need for daily compliance of all meds!!!  \par \par see referral

## 2021-01-18 NOTE — PHYSICAL EXAM
[Well Nourished] : well nourished [Well Developed] : well developed [Well-Appearing] : well-appearing [EOMI] : extraocular movements intact [Normal Outer Ear/Nose] : the outer ears and nose were normal in appearance [No JVD] : no jugular venous distention [No Respiratory Distress] : no respiratory distress  [No Accessory Muscle Use] : no accessory muscle use [Normal Rate] : normal rate  [Regular Rhythm] : with a regular rhythm [Normal S1, S2] : normal S1 and S2 [No Edema] : there was no peripheral edema [Non-distended] : non-distended [No CVA Tenderness] : no CVA  tenderness [Grossly Normal Strength/Tone] : grossly normal strength/tone [Coordination Grossly Intact] : coordination grossly intact [Normal Affect] : the affect was normal [Normal Mood] : the mood was normal [Normal Insight/Judgement] : insight and judgment were intact

## 2021-01-18 NOTE — HISTORY OF PRESENT ILLNESS
[FreeTextEntry1] : F/u on thyroid and B12 [de-identified] : 24 yo female for f/u on B12 def. and Hypothyroidism.  pt reports compliance c OTC B12  1000mcg qd, however, admits to poor compliance c LT4 175mcg qd.  pt states taking meds on average 2-3d/week \par \par Denies f/c/s no cough sense of smell/taste intact \par \par

## 2021-01-19 LAB
BASOPHILS # BLD AUTO: 0.03 K/UL
BASOPHILS NFR BLD AUTO: 0.5 %
EOSINOPHIL # BLD AUTO: 0.27 K/UL
EOSINOPHIL NFR BLD AUTO: 4.1 %
HCT VFR BLD CALC: 37.1 %
HGB BLD-MCNC: 11.6 G/DL
IMM GRANULOCYTES NFR BLD AUTO: 0.2 %
LYMPHOCYTES # BLD AUTO: 1.46 K/UL
LYMPHOCYTES NFR BLD AUTO: 22 %
MAN DIFF?: NORMAL
MCHC RBC-ENTMCNC: 27.6 PG
MCHC RBC-ENTMCNC: 31.3 GM/DL
MCV RBC AUTO: 88.3 FL
MONOCYTES # BLD AUTO: 0.51 K/UL
MONOCYTES NFR BLD AUTO: 7.7 %
NEUTROPHILS # BLD AUTO: 4.35 K/UL
NEUTROPHILS NFR BLD AUTO: 65.5 %
PLATELET # BLD AUTO: 325 K/UL
RBC # BLD: 4.2 M/UL
RBC # FLD: 13.4 %
SARS-COV-2 IGG SERPL IA-ACNC: 5.59 INDEX
SARS-COV-2 IGG SERPL QL IA: POSITIVE
T3 SERPL-MCNC: 100 NG/DL
T4 SERPL-MCNC: 8.8 UG/DL
TSH SERPL-ACNC: 12.4 UIU/ML
VIT B12 SERPL-MCNC: 828 PG/ML
WBC # FLD AUTO: 6.63 K/UL

## 2021-01-25 ENCOUNTER — APPOINTMENT (OUTPATIENT)
Dept: SURGERY | Facility: CLINIC | Age: 26
End: 2021-01-25

## 2021-01-26 ENCOUNTER — APPOINTMENT (OUTPATIENT)
Dept: FAMILY MEDICINE | Facility: CLINIC | Age: 26
End: 2021-01-26
Payer: MEDICAID

## 2021-01-26 DIAGNOSIS — R06.83 SNORING: ICD-10-CM

## 2021-01-26 DIAGNOSIS — R06.81 APNEA, NOT ELSEWHERE CLASSIFIED: ICD-10-CM

## 2021-01-26 PROCEDURE — 99213 OFFICE O/P EST LOW 20 MIN: CPT | Mod: 95

## 2021-01-26 NOTE — HISTORY OF PRESENT ILLNESS
[Home] : at home, [unfilled] , at the time of the visit. [Medical Office: ([facility])___] : at the medical office located in  [Verbal consent obtained from patient] : the patient, [unfilled] [FreeTextEntry1] : eval of "sleep apnea"  [de-identified] : 24 yo obese female c BMI = 37.16 presents c c/o trouble sleeping.   +ve daytime fatigue,   +ve snoring.   +ve abnormal movements \par during sleep.  Intermittent apneic episodes associated c palpitations.   +ve "vertigo" in am   \par +ve loud snoring

## 2021-01-26 NOTE — PLAN
[FreeTextEntry1] : Sleep hygiene education given to pt \par Start OTC Melatonin 5-10mg hs \par \par see referral

## 2021-01-29 DIAGNOSIS — Z01.818 ENCOUNTER FOR OTHER PREPROCEDURAL EXAMINATION: ICD-10-CM

## 2021-01-30 ENCOUNTER — APPOINTMENT (OUTPATIENT)
Dept: DISASTER EMERGENCY | Facility: CLINIC | Age: 26
End: 2021-01-30

## 2021-01-31 LAB — SARS-COV-2 N GENE NPH QL NAA+PROBE: NOT DETECTED

## 2021-02-06 ENCOUNTER — APPOINTMENT (OUTPATIENT)
Dept: DISASTER EMERGENCY | Facility: CLINIC | Age: 26
End: 2021-02-06

## 2021-02-07 LAB — SARS-COV-2 N GENE NPH QL NAA+PROBE: NOT DETECTED

## 2021-02-08 ENCOUNTER — OUTPATIENT (OUTPATIENT)
Dept: OUTPATIENT SERVICES | Facility: HOSPITAL | Age: 26
LOS: 1 days | End: 2021-02-08
Payer: COMMERCIAL

## 2021-02-08 DIAGNOSIS — Z90.49 ACQUIRED ABSENCE OF OTHER SPECIFIED PARTS OF DIGESTIVE TRACT: Chronic | ICD-10-CM

## 2021-02-08 DIAGNOSIS — G47.33 OBSTRUCTIVE SLEEP APNEA (ADULT) (PEDIATRIC): ICD-10-CM

## 2021-02-08 DIAGNOSIS — Z98.89 OTHER SPECIFIED POSTPROCEDURAL STATES: Chronic | ICD-10-CM

## 2021-02-08 DIAGNOSIS — Z98.890 OTHER SPECIFIED POSTPROCEDURAL STATES: Chronic | ICD-10-CM

## 2021-02-08 PROCEDURE — 95810 POLYSOM 6/> YRS 4/> PARAM: CPT | Mod: 26

## 2021-02-08 PROCEDURE — 95810 POLYSOM 6/> YRS 4/> PARAM: CPT

## 2021-03-01 ENCOUNTER — APPOINTMENT (OUTPATIENT)
Dept: FAMILY MEDICINE | Facility: CLINIC | Age: 26
End: 2021-03-01
Payer: MEDICAID

## 2021-03-01 VITALS
SYSTOLIC BLOOD PRESSURE: 112 MMHG | HEIGHT: 59 IN | BODY MASS INDEX: 37.09 KG/M2 | OXYGEN SATURATION: 98 % | WEIGHT: 184 LBS | HEART RATE: 85 BPM | TEMPERATURE: 98.4 F | DIASTOLIC BLOOD PRESSURE: 80 MMHG

## 2021-03-01 DIAGNOSIS — G47.8 OTHER SLEEP DISORDERS: ICD-10-CM

## 2021-03-01 PROCEDURE — 99214 OFFICE O/P EST MOD 30 MIN: CPT

## 2021-03-01 PROCEDURE — 99072 ADDL SUPL MATRL&STAF TM PHE: CPT

## 2021-03-01 NOTE — PLAN
[FreeTextEntry1] : see referral \par \par cont diet/exercise and lifestyle modification \par pt goes to [facility] 3d/week

## 2021-03-01 NOTE — PHYSICAL EXAM
[No Acute Distress] : no acute distress [Well Nourished] : well nourished [Well Developed] : well developed [Well-Appearing] : well-appearing [EOMI] : extraocular movements intact [Normal Outer Ear/Nose] : the outer ears and nose were normal in appearance [No JVD] : no jugular venous distention

## 2021-03-01 NOTE — HISTORY OF PRESENT ILLNESS
[FreeTextEntry1] : F/u sleep apnea  [de-identified] : 24 yo female for f/u on Obesity.  Denies CP/SOB at rest,  +ve fatigue/sluggishness this am that has since resolved c intermittent brain fog \par since COVID-19 Dx   12/8/20.  \par no dizziness no palpitations \par no N/V/D +BM yesterday  +ve loose stools yesterday x 1 no bloody/black stools \par no  urinary complaints

## 2021-03-08 ENCOUNTER — APPOINTMENT (OUTPATIENT)
Dept: SURGERY | Facility: CLINIC | Age: 26
End: 2021-03-08

## 2021-03-23 NOTE — PHYSICAL EXAM
[Menarche Age ____] : age at menarche was [unfilled] [Total Preg ___] : G[unfilled] [Live Births ___] : P[unfilled]  [No Acute Distress] : no acute distress [Abortions ___] : Abortions:[unfilled] [Well Nourished] : well nourished [Age At Live Birth ___] : Age at live birth: [unfilled] [Well Developed] : well developed [EOMI] : extraocular movements intact [History of Hormone Replacement Treatment] : has no history of hormone replacement treatment [Well-Appearing] : well-appearing [No JVD] : no jugular venous distention [Normal Outer Ear/Nose] : the outer ears and nose were normal in appearance [No Accessory Muscle Use] : no accessory muscle use [Clear to Auscultation] : lungs were clear to auscultation bilaterally [No Respiratory Distress] : no respiratory distress  [Normal S1, S2] : normal S1 and S2 [Regular Rhythm] : with a regular rhythm [Normal Rate] : normal rate  [No Carotid Bruits] : no carotid bruits [No Edema] : there was no peripheral edema [Non-distended] : non-distended [No CVA Tenderness] : no CVA  tenderness [Coordination Grossly Intact] : coordination grossly intact [Grossly Normal Strength/Tone] : grossly normal strength/tone [No Rash] : no rash [No Focal Deficits] : no focal deficits [Normal Affect] : the affect was normal [Normal Gait] : normal gait [Normal Mood] : the mood was normal [Normal Insight/Judgement] : insight and judgment were intact

## 2021-05-14 ENCOUNTER — APPOINTMENT (OUTPATIENT)
Dept: CARDIOLOGY | Facility: CLINIC | Age: 26
End: 2021-05-14

## 2021-07-14 ENCOUNTER — TRANSCRIPTION ENCOUNTER (OUTPATIENT)
Age: 26
End: 2021-07-14

## 2021-10-05 ENCOUNTER — APPOINTMENT (OUTPATIENT)
Dept: FAMILY MEDICINE | Facility: CLINIC | Age: 26
End: 2021-10-05
Payer: MEDICAID

## 2021-10-05 VITALS
TEMPERATURE: 98.3 F | DIASTOLIC BLOOD PRESSURE: 68 MMHG | SYSTOLIC BLOOD PRESSURE: 110 MMHG | OXYGEN SATURATION: 99 % | HEIGHT: 59 IN | HEART RATE: 90 BPM | BODY MASS INDEX: 35.08 KG/M2 | WEIGHT: 174 LBS

## 2021-10-05 DIAGNOSIS — Z3A.17 17 WEEKS GESTATION OF PREGNANCY: ICD-10-CM

## 2021-10-05 DIAGNOSIS — Z92.29 PERSONAL HISTORY OF OTHER DRUG THERAPY: ICD-10-CM

## 2021-10-05 PROCEDURE — 99214 OFFICE O/P EST MOD 30 MIN: CPT | Mod: 25

## 2021-10-05 NOTE — PAST MEDICAL HISTORY
[Menstruating] : menstruating [Definite ___ (Date)] : the last menstrual period was [unfilled] [Total Preg ___] : G[unfilled] [Live Births ___] : P[unfilled]  [Full Term ___] : Full Term: [unfilled]

## 2021-10-05 NOTE — HISTORY OF PRESENT ILLNESS
[FreeTextEntry1] : Follow Up Thyroid [de-identified] : 25 yo female 17 weeks and 3 d gestation under the care of [facility]  Dr. Bills ([place]) c hx of hypothyroidism presents for  TFT check. \par \par pt admits to fatigue \par Denies CP and/or SOB,   no  dizziness no palpitations \par +ve nausea and vomiting improved c aid of Zofran 4mg qd-bid, prn \par +BM 1-2x/day c some strain,   no bloody/black stools \par Denies pain/discomfort/pain c urination

## 2021-10-05 NOTE — PHYSICAL EXAM
[No Acute Distress] : no acute distress [Well Nourished] : well nourished [Well Developed] : well developed [Well-Appearing] : well-appearing [EOMI] : extraocular movements intact [Normal Outer Ear/Nose] : the outer ears and nose were normal in appearance [No JVD] : no jugular venous distention [No Respiratory Distress] : no respiratory distress  [No Accessory Muscle Use] : no accessory muscle use [Clear to Auscultation] : lungs were clear to auscultation bilaterally [Normal Rate] : normal rate  [Regular Rhythm] : with a regular rhythm [Normal S1, S2] : normal S1 and S2 [No Carotid Bruits] : no carotid bruits [No Edema] : there was no peripheral edema [Grossly Normal Strength/Tone] : grossly normal strength/tone [No Rash] : no rash [Coordination Grossly Intact] : coordination grossly intact [No Focal Deficits] : no focal deficits [Normal Gait] : normal gait [Normal Affect] : the affect was normal [Normal Mood] : the mood was normal [Normal Insight/Judgement] : insight and judgment were intact [de-identified] : gravid abdomen,  mild LLQ discomfort to palpation probable round ligament associated pain

## 2021-10-05 NOTE — REVIEW OF SYSTEMS
[Fatigue] : fatigue [Nausea] : nausea [Vomiting] : vomiting [Negative] : Heme/Lymph [FreeTextEntry7] : see HPI

## 2021-10-06 LAB
ALBUMIN SERPL ELPH-MCNC: 3.8 G/DL
ALP BLD-CCNC: 75 U/L
ALT SERPL-CCNC: 34 U/L
ANION GAP SERPL CALC-SCNC: 12 MMOL/L
AST SERPL-CCNC: 21 U/L
BASOPHILS # BLD AUTO: 0.04 K/UL
BASOPHILS NFR BLD AUTO: 0.4 %
BILIRUB SERPL-MCNC: 0.2 MG/DL
BUN SERPL-MCNC: 6 MG/DL
CALCIUM SERPL-MCNC: 9.5 MG/DL
CHLORIDE SERPL-SCNC: 102 MMOL/L
CO2 SERPL-SCNC: 23 MMOL/L
COVID-19 SPIKE DOMAIN ANTIBODY INTERPRETATION: POSITIVE
CREAT SERPL-MCNC: 0.39 MG/DL
EOSINOPHIL # BLD AUTO: 0.14 K/UL
EOSINOPHIL NFR BLD AUTO: 1.5 %
ESTIMATED AVERAGE GLUCOSE: 105 MG/DL
FOLATE SERPL-MCNC: 18.6 NG/ML
GLUCOSE SERPL-MCNC: 109 MG/DL
HBA1C MFR BLD HPLC: 5.3 %
HCT VFR BLD CALC: 32.9 %
HGB BLD-MCNC: 10.8 G/DL
IMM GRANULOCYTES NFR BLD AUTO: 1.1 %
LYMPHOCYTES # BLD AUTO: 1.44 K/UL
LYMPHOCYTES NFR BLD AUTO: 15.2 %
MAN DIFF?: NORMAL
MCHC RBC-ENTMCNC: 28.7 PG
MCHC RBC-ENTMCNC: 32.8 GM/DL
MCV RBC AUTO: 87.5 FL
MONOCYTES # BLD AUTO: 0.6 K/UL
MONOCYTES NFR BLD AUTO: 6.3 %
NEUTROPHILS # BLD AUTO: 7.18 K/UL
NEUTROPHILS NFR BLD AUTO: 75.5 %
PLATELET # BLD AUTO: 274 K/UL
POTASSIUM SERPL-SCNC: 3.9 MMOL/L
PROT SERPL-MCNC: 6.3 G/DL
RBC # BLD: 3.76 M/UL
RBC # FLD: 14.2 %
SARS-COV-2 AB SERPL IA-ACNC: >250 U/ML
SODIUM SERPL-SCNC: 137 MMOL/L
T3FREE SERPL-MCNC: 2.59 PG/ML
T4 FREE SERPL-MCNC: 1 NG/DL
TSH SERPL-ACNC: 4.57 UIU/ML
VIT B12 SERPL-MCNC: 279 PG/ML
WBC # FLD AUTO: 9.5 K/UL

## 2021-12-21 NOTE — ED ADULT TRIAGE NOTE - MEANS OF ARRIVAL
ambulatory [facility] Pregnancy And Lactation Text: This medication is Pregnancy Category X and contraindicated in pregnancy and in women who may become pregnant. It is unknown if this medication is excreted in breast milk.

## 2022-01-03 ENCOUNTER — APPOINTMENT (OUTPATIENT)
Dept: FAMILY MEDICINE | Facility: CLINIC | Age: 27
End: 2022-01-03
Payer: MEDICAID

## 2022-01-13 ENCOUNTER — APPOINTMENT (OUTPATIENT)
Dept: FAMILY MEDICINE | Facility: CLINIC | Age: 27
End: 2022-01-13
Payer: MEDICAID

## 2022-01-13 VITALS
TEMPERATURE: 98 F | SYSTOLIC BLOOD PRESSURE: 126 MMHG | WEIGHT: 179 LBS | HEIGHT: 59 IN | HEART RATE: 80 BPM | BODY MASS INDEX: 36.08 KG/M2 | DIASTOLIC BLOOD PRESSURE: 74 MMHG | OXYGEN SATURATION: 98 %

## 2022-01-13 VITALS — SYSTOLIC BLOOD PRESSURE: 112 MMHG | DIASTOLIC BLOOD PRESSURE: 70 MMHG

## 2022-01-13 DIAGNOSIS — O24.419 GESTATIONAL DIABETES MELLITUS IN PREGNANCY, UNSPECIFIED CONTROL: ICD-10-CM

## 2022-01-13 DIAGNOSIS — Z3A.31 31 WEEKS GESTATION OF PREGNANCY: ICD-10-CM

## 2022-01-13 PROCEDURE — 99214 OFFICE O/P EST MOD 30 MIN: CPT | Mod: 25

## 2022-01-13 NOTE — HISTORY OF PRESENT ILLNESS
[FreeTextEntry1] : follow up on hypothyroidism  [de-identified] : 25 yo female 31 weeks and 5 days gestation presents for f/u on hypothyroidism.  Pt currently on LT4 175 mcg qd.  \par \par pt recently Dx'ed c Gestational DM. pt currently monitoring glucose in am fasting and post prandial (4x/day)    Next OB f/u tomorrow 1/14/22  c [facility] ([place], [place]) \par \par Pt c hx of PE in previous pregnancy therefore on daily Lovenox 40mg SQ  and 81 mg ASA antoinette\par

## 2022-01-18 LAB
ALBUMIN SERPL ELPH-MCNC: 3.7 G/DL
ALP BLD-CCNC: 132 U/L
ALT SERPL-CCNC: 33 U/L
ANION GAP SERPL CALC-SCNC: 15 MMOL/L
AST SERPL-CCNC: 24 U/L
BASOPHILS # BLD AUTO: 0.03 K/UL
BASOPHILS NFR BLD AUTO: 0.3 %
BILIRUB SERPL-MCNC: 0.2 MG/DL
BUN SERPL-MCNC: 5 MG/DL
CALCIUM SERPL-MCNC: 9.4 MG/DL
CHLORIDE SERPL-SCNC: 103 MMOL/L
CO2 SERPL-SCNC: 20 MMOL/L
CREAT SERPL-MCNC: 0.39 MG/DL
EOSINOPHIL # BLD AUTO: 0.12 K/UL
EOSINOPHIL NFR BLD AUTO: 1.3 %
ESTIMATED AVERAGE GLUCOSE: 114 MG/DL
GLUCOSE SERPL-MCNC: 102 MG/DL
HBA1C MFR BLD HPLC: 5.6 %
HCT VFR BLD CALC: 37.6 %
HGB BLD-MCNC: 11.6 G/DL
IMM GRANULOCYTES NFR BLD AUTO: 1.2 %
LYMPHOCYTES # BLD AUTO: 1.43 K/UL
LYMPHOCYTES NFR BLD AUTO: 15.6 %
MAN DIFF?: NORMAL
MCHC RBC-ENTMCNC: 27.4 PG
MCHC RBC-ENTMCNC: 30.9 GM/DL
MCV RBC AUTO: 88.9 FL
MONOCYTES # BLD AUTO: 0.83 K/UL
MONOCYTES NFR BLD AUTO: 9.1 %
NEUTROPHILS # BLD AUTO: 6.63 K/UL
NEUTROPHILS NFR BLD AUTO: 72.5 %
PLATELET # BLD AUTO: 284 K/UL
POTASSIUM SERPL-SCNC: 4.8 MMOL/L
PROT SERPL-MCNC: 6.2 G/DL
RBC # BLD: 4.23 M/UL
RBC # FLD: 14 %
SODIUM SERPL-SCNC: 138 MMOL/L
T3FREE SERPL-MCNC: 2.71 PG/ML
T4 FREE SERPL-MCNC: 1.1 NG/DL
TSH SERPL-ACNC: 2.57 UIU/ML
WBC # FLD AUTO: 9.15 K/UL

## 2022-03-11 ENCOUNTER — APPOINTMENT (OUTPATIENT)
Dept: FAMILY MEDICINE | Facility: CLINIC | Age: 27
End: 2022-03-11

## 2022-03-18 ENCOUNTER — APPOINTMENT (OUTPATIENT)
Dept: FAMILY MEDICINE | Facility: CLINIC | Age: 27
End: 2022-03-18
Payer: MEDICAID

## 2022-03-18 VITALS
TEMPERATURE: 97.7 F | DIASTOLIC BLOOD PRESSURE: 88 MMHG | HEART RATE: 76 BPM | HEIGHT: 59 IN | BODY MASS INDEX: 33.47 KG/M2 | OXYGEN SATURATION: 98 % | SYSTOLIC BLOOD PRESSURE: 120 MMHG | WEIGHT: 166 LBS

## 2022-03-18 DIAGNOSIS — M77.8 OTHER ENTHESOPATHIES, NOT ELSEWHERE CLASSIFIED: ICD-10-CM

## 2022-03-18 DIAGNOSIS — N60.42 MAMMARY DUCT ECTASIA OF LEFT BREAST: ICD-10-CM

## 2022-03-18 PROCEDURE — 99213 OFFICE O/P EST LOW 20 MIN: CPT

## 2022-03-18 RX ORDER — ENOXAPARIN SODIUM 40 MG/.4ML
40 INJECTION SUBCUTANEOUS
Refills: 0 | Status: COMPLETED | COMMUNITY
Start: 2021-10-05 | End: 2022-03-18

## 2022-03-18 NOTE — HISTORY OF PRESENT ILLNESS
[FreeTextEntry8] : Pt c/o lump on left breast x 2 weeks.\par \par 25 yo female 2 weeks post partum (3/4/22)  presents c 5 day hx of  painful 'lump" L breast.  pt is nursing both from breast and pumping.   Admits to decreased milk production on L side.\par Reports adequate hydration \par NO improving c increased pumping frequency \par Reports compliance c pumping from affected breast 1st \par Denies f/c/s \par Denies redness and/or heat radiating from L breast \par \par pt also reports B/L wrist tenderness associated c decreased strength and decreased ROM since delivery

## 2022-03-18 NOTE — PHYSICAL EXAM
[No Acute Distress] : no acute distress [Well Nourished] : well nourished [Well Developed] : well developed [Well-Appearing] : well-appearing [EOMI] : extraocular movements intact [Normal Outer Ear/Nose] : the outer ears and nose were normal in appearance [No JVD] : no jugular venous distention [No Respiratory Distress] : no respiratory distress  [No Accessory Muscle Use] : no accessory muscle use [Clear to Auscultation] : lungs were clear to auscultation bilaterally [Normal Rate] : normal rate  [Regular Rhythm] : with a regular rhythm [Normal S1, S2] : normal S1 and S2 [No Edema] : there was no peripheral edema [Non-distended] : non-distended [No CVA Tenderness] : no CVA  tenderness [No Rash] : no rash [Coordination Grossly Intact] : coordination grossly intact [No Focal Deficits] : no focal deficits [Normal Gait] : normal gait [Normal Affect] : the affect was normal [Normal Mood] : the mood was normal [Normal Insight/Judgement] : insight and judgment were intact [de-identified] : R breast  c normal appearance,  no masses, NT to palpation, no axillary LAD.  +ve easily expressed milky nipple d/c.  L breast larger in appearance when compared to L breast,  increased density on examination when compared to R.  +ve TTP 1-2 oclock position. minimal expressed milky d/c L nipple   no axillary LAD L  [de-identified] : +ve Finkelstein test B/L Wrists  decreased ROM secondary to pain decreased strength secondary to pain NV intact

## 2022-03-18 NOTE — PLAN
[FreeTextEntry1] : continue to breast pump frequently \par start from affected breast first \par increase hydration!!!!!!!!\par apply warm compresses to L breast 20-30 mins prior to lactation \par Epsom salt baths 2x/day of B/L breasts \par pt instructed to call office if fever develops, breast pain acutely worsens,  breast becomes red and/or swollen \par \par \par OTC Tylenol prn for pain \par B/L cockup splint hs (see Rx orders)

## 2022-05-09 ENCOUNTER — APPOINTMENT (OUTPATIENT)
Dept: ORTHOPEDIC SURGERY | Facility: CLINIC | Age: 27
End: 2022-05-09
Payer: MEDICAID

## 2022-05-09 VITALS
BODY MASS INDEX: 31.85 KG/M2 | SYSTOLIC BLOOD PRESSURE: 114 MMHG | HEIGHT: 59 IN | DIASTOLIC BLOOD PRESSURE: 76 MMHG | WEIGHT: 158 LBS | HEART RATE: 84 BPM

## 2022-05-09 DIAGNOSIS — M65.4 RADIAL STYLOID TENOSYNOVITIS [DE QUERVAIN]: ICD-10-CM

## 2022-05-09 PROCEDURE — 99204 OFFICE O/P NEW MOD 45 MIN: CPT

## 2022-09-14 ENCOUNTER — APPOINTMENT (OUTPATIENT)
Dept: FAMILY MEDICINE | Facility: CLINIC | Age: 27
End: 2022-09-14

## 2022-09-16 ENCOUNTER — APPOINTMENT (OUTPATIENT)
Dept: FAMILY MEDICINE | Facility: CLINIC | Age: 27
End: 2022-09-16

## 2022-09-16 VITALS
SYSTOLIC BLOOD PRESSURE: 122 MMHG | BODY MASS INDEX: 36.29 KG/M2 | HEART RATE: 74 BPM | DIASTOLIC BLOOD PRESSURE: 74 MMHG | WEIGHT: 180 LBS | OXYGEN SATURATION: 99 % | TEMPERATURE: 98 F | HEIGHT: 59 IN

## 2022-09-16 DIAGNOSIS — J02.9 ACUTE PHARYNGITIS, UNSPECIFIED: ICD-10-CM

## 2022-09-16 PROCEDURE — 99214 OFFICE O/P EST MOD 30 MIN: CPT

## 2022-09-16 RX ORDER — DICLOFENAC SODIUM 1% 10 MG/G
1 GEL TOPICAL
Qty: 1 | Refills: 2 | Status: COMPLETED | COMMUNITY
Start: 2022-05-09 | End: 2022-09-16

## 2022-09-16 RX ORDER — MELOXICAM 15 MG/1
15 TABLET ORAL
Qty: 30 | Refills: 0 | Status: COMPLETED | COMMUNITY
Start: 2022-03-30 | End: 2022-09-16

## 2022-09-16 RX ORDER — ALBUTEROL SULFATE 90 UG/1
108 (90 BASE) AEROSOL, METERED RESPIRATORY (INHALATION)
Qty: 1 | Refills: 1 | Status: COMPLETED | COMMUNITY
Start: 2020-12-10 | End: 2022-09-16

## 2022-09-16 RX ORDER — METHYLPREDNISOLONE 4 MG/1
4 TABLET ORAL
Qty: 1 | Refills: 0 | Status: COMPLETED | COMMUNITY
Start: 2022-05-09 | End: 2022-09-16

## 2022-09-16 NOTE — HISTORY OF PRESENT ILLNESS
[FreeTextEntry8] : ABHAY is a 27 year old female here for c/o of right ear pain and a painful lump in throat.\par \par as above c 2 day hx of R sided throat, R ear pain   \par no f/c/s \par no dysphagia \par no N/V/D no abd pain +ve good appetite  \par no cough \par no nasal congestion  \par no known sick  contact \par NOT nursing \par \par pt states has NOT taken LT4 175mcg in > 1 week

## 2022-09-16 NOTE — PLAN
[FreeTextEntry1] : aggressive hydration!!!!!!!!!!!!!!!!!\par \par see med orders  f/u 1-2 weeks for TFTs and influenza vaccine \par \par cont low carb low sugar diet!!!!!!

## 2022-09-23 ENCOUNTER — APPOINTMENT (OUTPATIENT)
Dept: FAMILY MEDICINE | Facility: CLINIC | Age: 27
End: 2022-09-23

## 2022-09-23 VITALS
HEART RATE: 75 BPM | HEIGHT: 59 IN | OXYGEN SATURATION: 99 % | BODY MASS INDEX: 36.29 KG/M2 | TEMPERATURE: 97.7 F | SYSTOLIC BLOOD PRESSURE: 120 MMHG | WEIGHT: 180 LBS | DIASTOLIC BLOOD PRESSURE: 76 MMHG

## 2022-09-23 PROCEDURE — 99213 OFFICE O/P EST LOW 20 MIN: CPT | Mod: 25

## 2022-09-23 PROCEDURE — 36415 COLL VENOUS BLD VENIPUNCTURE: CPT

## 2022-09-23 RX ORDER — AMOXICILLIN AND CLAVULANATE POTASSIUM 875; 125 MG/1; MG/1
875-125 TABLET, COATED ORAL
Qty: 14 | Refills: 0 | Status: COMPLETED | COMMUNITY
Start: 2022-09-16 | End: 2022-09-23

## 2022-09-23 NOTE — PHYSICAL EXAM
[No Acute Distress] : no acute distress [Well Nourished] : well nourished [Well Developed] : well developed [Well-Appearing] : well-appearing [EOMI] : extraocular movements intact [Normal Outer Ear/Nose] : the outer ears and nose were normal in appearance [No JVD] : no jugular venous distention [No Respiratory Distress] : no respiratory distress  [No Accessory Muscle Use] : no accessory muscle use [Clear to Auscultation] : lungs were clear to auscultation bilaterally [Normal Rate] : normal rate  [Regular Rhythm] : with a regular rhythm [Normal S1, S2] : normal S1 and S2 [No Carotid Bruits] : no carotid bruits [No Edema] : there was no peripheral edema [Non-distended] : non-distended [No CVA Tenderness] : no CVA  tenderness [Grossly Normal Strength/Tone] : grossly normal strength/tone [No Rash] : no rash [Speech Grossly Normal] : speech grossly normal [Normal Affect] : the affect was normal

## 2022-09-23 NOTE — HISTORY OF PRESENT ILLNESS
[FreeTextEntry1] : ABHAY is a 27 year old female here for a follow up. [de-identified] : 28 yo female for f/u on Hypothyroidism.   pt states has been without LT4 175mcg for several weeks. \par Admits to increased fatigue \par +ve decreased appetite \par no constipation \par +ve hair loss, however, pt is 6M post partum  \par Denies brittle nails \par mild dry skin

## 2022-09-27 LAB
T3FREE SERPL-MCNC: 2.43 PG/ML
T4 FREE SERPL-MCNC: 0.6 NG/DL
TSH SERPL-ACNC: 42 UIU/ML

## 2022-10-24 ENCOUNTER — INPATIENT (INPATIENT)
Facility: HOSPITAL | Age: 27
LOS: 1 days | Discharge: HOME | DRG: 62 | End: 2022-10-26
Attending: INTERNAL MEDICINE | Admitting: INTERNAL MEDICINE
Payer: COMMERCIAL

## 2022-10-24 VITALS
SYSTOLIC BLOOD PRESSURE: 127 MMHG | HEIGHT: 59 IN | TEMPERATURE: 98 F | HEART RATE: 70 BPM | RESPIRATION RATE: 18 BRPM | OXYGEN SATURATION: 99 % | WEIGHT: 179.9 LBS | DIASTOLIC BLOOD PRESSURE: 84 MMHG

## 2022-10-24 DIAGNOSIS — Z98.890 OTHER SPECIFIED POSTPROCEDURAL STATES: Chronic | ICD-10-CM

## 2022-10-24 DIAGNOSIS — Z90.49 ACQUIRED ABSENCE OF OTHER SPECIFIED PARTS OF DIGESTIVE TRACT: Chronic | ICD-10-CM

## 2022-10-24 DIAGNOSIS — Z98.89 OTHER SPECIFIED POSTPROCEDURAL STATES: Chronic | ICD-10-CM

## 2022-10-24 DIAGNOSIS — I63.9 CEREBRAL INFARCTION, UNSPECIFIED: ICD-10-CM

## 2022-10-24 LAB
ALBUMIN SERPL ELPH-MCNC: 4.4 G/DL — SIGNIFICANT CHANGE UP (ref 3.3–5.2)
ALP SERPL-CCNC: 81 U/L — SIGNIFICANT CHANGE UP (ref 40–120)
ALT FLD-CCNC: 27 U/L — SIGNIFICANT CHANGE UP
ANION GAP SERPL CALC-SCNC: 9 MMOL/L — SIGNIFICANT CHANGE UP (ref 5–17)
APTT BLD: 27.9 SEC — SIGNIFICANT CHANGE UP (ref 27.5–35.5)
AST SERPL-CCNC: 20 U/L — SIGNIFICANT CHANGE UP
BASOPHILS # BLD AUTO: 0.03 K/UL — SIGNIFICANT CHANGE UP (ref 0–0.2)
BASOPHILS NFR BLD AUTO: 0.3 % — SIGNIFICANT CHANGE UP (ref 0–2)
BILIRUB SERPL-MCNC: 0.2 MG/DL — LOW (ref 0.4–2)
BUN SERPL-MCNC: 11 MG/DL — SIGNIFICANT CHANGE UP (ref 8–20)
CALCIUM SERPL-MCNC: 8.8 MG/DL — SIGNIFICANT CHANGE UP (ref 8.4–10.5)
CHLORIDE SERPL-SCNC: 100 MMOL/L — SIGNIFICANT CHANGE UP (ref 98–107)
CO2 SERPL-SCNC: 25 MMOL/L — SIGNIFICANT CHANGE UP (ref 22–29)
CREAT SERPL-MCNC: 0.41 MG/DL — LOW (ref 0.5–1.3)
EGFR: 137 ML/MIN/1.73M2 — SIGNIFICANT CHANGE UP
EOSINOPHIL # BLD AUTO: 0.09 K/UL — SIGNIFICANT CHANGE UP (ref 0–0.5)
EOSINOPHIL NFR BLD AUTO: 1 % — SIGNIFICANT CHANGE UP (ref 0–6)
ETHANOL SERPL-MCNC: <10 MG/DL — SIGNIFICANT CHANGE UP (ref 0–9)
FLUAV AG NPH QL: SIGNIFICANT CHANGE UP
FLUBV AG NPH QL: SIGNIFICANT CHANGE UP
GLUCOSE SERPL-MCNC: 95 MG/DL — SIGNIFICANT CHANGE UP (ref 70–99)
HCG SERPL-ACNC: <4 MIU/ML — SIGNIFICANT CHANGE UP
HCT VFR BLD CALC: 34.5 % — SIGNIFICANT CHANGE UP (ref 34.5–45)
HGB BLD-MCNC: 11.5 G/DL — SIGNIFICANT CHANGE UP (ref 11.5–15.5)
IMM GRANULOCYTES NFR BLD AUTO: 0.7 % — SIGNIFICANT CHANGE UP (ref 0–0.9)
INR BLD: 1.09 RATIO — SIGNIFICANT CHANGE UP (ref 0.88–1.16)
LYMPHOCYTES # BLD AUTO: 1.36 K/UL — SIGNIFICANT CHANGE UP (ref 1–3.3)
LYMPHOCYTES # BLD AUTO: 15.2 % — SIGNIFICANT CHANGE UP (ref 13–44)
MCHC RBC-ENTMCNC: 27.8 PG — SIGNIFICANT CHANGE UP (ref 27–34)
MCHC RBC-ENTMCNC: 33.3 GM/DL — SIGNIFICANT CHANGE UP (ref 32–36)
MCV RBC AUTO: 83.5 FL — SIGNIFICANT CHANGE UP (ref 80–100)
MONOCYTES # BLD AUTO: 0.72 K/UL — SIGNIFICANT CHANGE UP (ref 0–0.9)
MONOCYTES NFR BLD AUTO: 8.1 % — SIGNIFICANT CHANGE UP (ref 2–14)
NEUTROPHILS # BLD AUTO: 6.66 K/UL — SIGNIFICANT CHANGE UP (ref 1.8–7.4)
NEUTROPHILS NFR BLD AUTO: 74.7 % — SIGNIFICANT CHANGE UP (ref 43–77)
PLATELET # BLD AUTO: 282 K/UL — SIGNIFICANT CHANGE UP (ref 150–400)
POTASSIUM SERPL-MCNC: 4.3 MMOL/L — SIGNIFICANT CHANGE UP (ref 3.5–5.3)
POTASSIUM SERPL-SCNC: 4.3 MMOL/L — SIGNIFICANT CHANGE UP (ref 3.5–5.3)
PROT SERPL-MCNC: 7.2 G/DL — SIGNIFICANT CHANGE UP (ref 6.6–8.7)
PROTHROM AB SERPL-ACNC: 12.6 SEC — SIGNIFICANT CHANGE UP (ref 10.5–13.4)
RBC # BLD: 4.13 M/UL — SIGNIFICANT CHANGE UP (ref 3.8–5.2)
RBC # FLD: 13.2 % — SIGNIFICANT CHANGE UP (ref 10.3–14.5)
RSV RNA NPH QL NAA+NON-PROBE: SIGNIFICANT CHANGE UP
SARS-COV-2 RNA SPEC QL NAA+PROBE: SIGNIFICANT CHANGE UP
SODIUM SERPL-SCNC: 134 MMOL/L — LOW (ref 135–145)
TROPONIN T SERPL-MCNC: <0.01 NG/ML — SIGNIFICANT CHANGE UP (ref 0–0.06)
TSH SERPL-MCNC: 5.04 UIU/ML — HIGH (ref 0.27–4.2)
WBC # BLD: 8.92 K/UL — SIGNIFICANT CHANGE UP (ref 3.8–10.5)
WBC # FLD AUTO: 8.92 K/UL — SIGNIFICANT CHANGE UP (ref 3.8–10.5)

## 2022-10-24 PROCEDURE — 0042T: CPT | Mod: MA

## 2022-10-24 PROCEDURE — 99222 1ST HOSP IP/OBS MODERATE 55: CPT

## 2022-10-24 PROCEDURE — 70498 CT ANGIOGRAPHY NECK: CPT | Mod: 26,MA

## 2022-10-24 PROCEDURE — 99291 CRITICAL CARE FIRST HOUR: CPT

## 2022-10-24 PROCEDURE — 70496 CT ANGIOGRAPHY HEAD: CPT | Mod: 26,MA

## 2022-10-24 PROCEDURE — 93970 EXTREMITY STUDY: CPT | Mod: 26

## 2022-10-24 RX ORDER — ACETAMINOPHEN 500 MG
1000 TABLET ORAL ONCE
Refills: 0 | Status: DISCONTINUED | OUTPATIENT
Start: 2022-10-24 | End: 2022-10-26

## 2022-10-24 RX ORDER — LABETALOL HCL 100 MG
10 TABLET ORAL
Refills: 0 | Status: DISCONTINUED | OUTPATIENT
Start: 2022-10-24 | End: 2022-10-26

## 2022-10-24 RX ORDER — CHLORHEXIDINE GLUCONATE 213 G/1000ML
1 SOLUTION TOPICAL DAILY
Refills: 0 | Status: DISCONTINUED | OUTPATIENT
Start: 2022-10-24 | End: 2022-10-26

## 2022-10-24 RX ORDER — ALTEPLASE 100 MG
58.8 KIT INTRAVENOUS ONCE
Refills: 0 | Status: COMPLETED | OUTPATIENT
Start: 2022-10-24 | End: 2022-10-24

## 2022-10-24 RX ORDER — ACETAMINOPHEN 500 MG
1000 TABLET ORAL ONCE
Refills: 0 | Status: COMPLETED | OUTPATIENT
Start: 2022-10-24 | End: 2022-10-24

## 2022-10-24 RX ORDER — LEVOTHYROXINE SODIUM 125 MCG
75 TABLET ORAL DAILY
Refills: 0 | Status: DISCONTINUED | OUTPATIENT
Start: 2022-10-25 | End: 2022-10-25

## 2022-10-24 RX ORDER — HYDRALAZINE HCL 50 MG
10 TABLET ORAL
Refills: 0 | Status: DISCONTINUED | OUTPATIENT
Start: 2022-10-24 | End: 2022-10-26

## 2022-10-24 RX ORDER — ALTEPLASE 100 MG
6.5 KIT INTRAVENOUS ONCE
Refills: 0 | Status: COMPLETED | OUTPATIENT
Start: 2022-10-24 | End: 2022-10-24

## 2022-10-24 RX ADMIN — ALTEPLASE 6.5 MILLIGRAM(S): KIT at 13:56

## 2022-10-24 RX ADMIN — Medication 1000 MILLIGRAM(S): at 20:49

## 2022-10-24 RX ADMIN — ALTEPLASE 58.8 MILLIGRAM(S): KIT at 13:57

## 2022-10-24 RX ADMIN — Medication 400 MILLIGRAM(S): at 20:19

## 2022-10-24 RX ADMIN — ALTEPLASE 390 MILLIGRAM(S): KIT at 13:55

## 2022-10-24 RX ADMIN — ALTEPLASE 58.8 MILLIGRAM(S): KIT at 14:57

## 2022-10-24 RX ADMIN — ALTEPLASE 58.8 MILLIGRAM(S): KIT at 15:01

## 2022-10-24 NOTE — H&P ADULT - NSICDXPASTSURGICALHX_GEN_ALL_CORE_FT
PAST SURGICAL HISTORY:  H/O dilation and curettage     S/P appendectomy 2001    S/P cholecystectomy 2013    S/P ovarian cystectomy 2014

## 2022-10-24 NOTE — ED ADULT NURSE NOTE - OBJECTIVE STATEMENT
pt A&OX4, c/o  dizziness, nausea, some difficulty talking, left sided weakness and tingling  and syncope that started around 11:00 AM.  pt has hx PE not on AC, resp even and unlabored no distress noted, abd soft NTND

## 2022-10-24 NOTE — CONSULT NOTE ADULT - ASSESSMENT
IMPRESSION:  -S/P  iv tPA  R/O right hemispheric subcortical infarct    ASSESSMENT/ PLAN:   - IV tPA was administered after discussing all the risk and benefits with the patient.  - Admit to Neuro ICU.  - Neuro checks and vital signs  as per post IV tPA protocol  - SBP goal permissive but less than 180/105 mm Hg.  - No antiplatelets or anticoagulation for 24 hrs post IV tPA.  - Lipitor  for LDL goal of < 70 when cleared by dysphagia screen.  - Telemetry monitoring.  - CT head 24 hrs post IV tPA.  - MRI Brain stroke protocol when stable.  - Check fasting Lipid panel and HbA1c  - TTE with bubble study.  - Check hypercoagulable panel  - Check LE duplex.  - PT OT SLP evaluation.  - SCD/  for DVT prophylaxis.     IMPRESSION:  -S/P  iv tPA  R/O right hemispheric subcortical infarct    ASSESSMENT/ PLAN:   - IV tPA was administered after discussing all the risk and benefits with the patient.  _ There was a delay in administering tPA due to patient's initial hesitancy in consenting for IV tPA.  - Admit to Neuro ICU.  - Neuro checks and vital signs  as per post IV tPA protocol  - SBP goal permissive but less than 180/105 mm Hg.  - No antiplatelets or anticoagulation for 24 hrs post IV tPA.  - Lipitor  for LDL goal of < 70 when cleared by dysphagia screen.  - Telemetry monitoring.  - CT head 24 hrs post IV tPA.  - MRI Brain stroke protocol when stable.  - Check fasting Lipid panel and HbA1c  - TTE with bubble study.  - Check hypercoagulable panel  - Check LE duplex.  - PT OT SLP evaluation.  - SCD/  for DVT prophylaxis.

## 2022-10-24 NOTE — H&P ADULT - NSICDXPASTMEDICALHX_GEN_ALL_CORE_FT
PAST MEDICAL HISTORY:  History of pulmonary embolism 23 weeks gestation on VQ scan Had lovenox till delivery    Hypothyroidism

## 2022-10-24 NOTE — ED ADULT TRIAGE NOTE - CHIEF COMPLAINT QUOTE
patient c/o syncope at home, woke up with chest pressure. patient called  at 1115 because of dizziness. MD called to bedside and code stroke initiated.

## 2022-10-24 NOTE — H&P ADULT - HISTORY OF PRESENT ILLNESS
27-year-old female with history of hypothyroid and PE in pregnancy (at 5 months gestation in 2016), treated with Lovenox for duration of pregnancy, had negative hypercoaguable workup, AC discontinued after delivery. Presented to ED today stating she had dizziness with nausea this morning at 11am. She reports that these symptoms were followed by left sided weakness and numbness. Dizziness/nausea have resolved.  On arrival to CTH/CTA/CTP negative. NIHSS of 4, TPA administered at 13:55.     NIH: 4  mRs: 0 27-year-old female with history of hypothyroid and PE in pregnancy (at 5 months gestation in 2016), treated with Lovenox for duration of pregnancy, had negative hypercoaguable workup, AC discontinued after delivery. Presented to ED today stating she had dizziness with nausea this morning at 11am. She reports that these symptoms were followed by left sided weakness and numbness. Dizziness/nausea have resolved.  On arrival to CTH/CTA/CTP negative. NIHSS of 4, TPA administered at 13:55.     NIH: 4  mRs: 0    NIHSS = 4  1A: Level of consciousness       0= Alert; keenly responsive        1B: Ask month and age       0= Both questions right        1C: "Blink eyes" and "Squeeze Hands"       0= Performs both         2: Horizontal EOMs       0= Normal         3: Visual fields       0= No visual loss        4: Facial palsy (use grimace if obtunded)      +1= Minor paralysis (flat NLF, smile asymmetry)        5A: Left arm motor drift (count out loud and use fingers to show count)       +1= Drift but doesn't hit bed      5B: Right arm motor drift         0= No drift x 10 seconds          6A: Left leg motor drift        +1= Drift but doesn't hit bed          6B: Right leg motor drift       0= No drift x 10 seconds         7: Limb ataxia (FNF/heel-shin)       0= No ataxia             8: Sensation       +1= mild-moderate loss: can sense being touched         9: Language/aphasia- describe the scene (on florian); name the items; read the sentences (on florian)       0= Normal, no aphasia         10: Dysarthria- read the words       0= Normal         11: Extinction/inattention       0= No abnormality         Total NIHSS: 4

## 2022-10-24 NOTE — H&P ADULT - NSHPPHYSICALEXAM_GEN_ALL_CORE
General: Obese female, NAD, sitting up in bed  Eyes: Conjunctiva clear  Neck: Supple and symmetrical, no JVD  CV: RRR, no m/r/g  Lungs: CTA b/l, no use of accessory muscles, no wheezes, rales, rhonchi  Skin: warm, dry, no rashes  Psych: normal mood/affect  Abdomen: Generalized striae noted, Normal BS, soft, NT/ND  Extremities: no edema, cyanosis  Musculoskeletal: no obvious deformity  Detailed Neurologic Exam:    Mental status: The patient is awake and alert and has normal attention span.  The patient is fully oriented in 3 spheres. The patient is oriented to current events. The patient is able to name objects, follow commands, repeat sentences.    Cranial nerves: Pupils equal and react symmetrically to light. There is no visual field deficit to confrontation. Extraocular motion is full with no nystagmus. There is no ptosis. Facial sensation is intact. There is mild left facial. Facial musculature is symmetric. Shoulder shrug is normal. Tongue is midline.    Motor: There is normal bulk and tone.  There is no tremor.  Strength is 5/5 RUE/RLE.   Strength is 4/5 LUE/LLE with mild drift.    Sensation: Intact to light touch in 4 extremities    Cerebellar: There is no dysmetria on finger to nose testing.    Gait : deferred General: Obese female, NAD, sitting up in bed  Eyes: Conjunctiva clear  Neck: Supple and symmetrical, no JVD  CV: RRR, no m/r/g  Lungs: CTA b/l, no use of accessory muscles, no wheezes, rales, rhonchi  Skin: warm, dry, no rashes  Psych: normal mood/affect  Abdomen: Generalized striae noted, Normal BS, soft, NT/ND  Extremities: no edema, cyanosis  Musculoskeletal: no obvious deformity    Detailed Neurologic Exam:    Mental status: The patient is awake and alert and has normal attention span.  The patient is fully oriented in 3 spheres. The patient is oriented to current events. The patient is able to name objects, follow commands, repeat sentences.    Cranial nerves: Pupils equal and react symmetrically to light. There is no visual field deficit to confrontation. Extraocular motion is full with no nystagmus. There is no ptosis. Facial sensation is intact. There is mild left facial. Facial musculature is symmetric. Shoulder shrug is normal. Tongue is midline.    Motor: There is normal bulk and tone.  There is no tremor.  Strength is 5/5 RUE/RLE.   Strength is 4/5 LUE/LLE with mild drift.    Sensation: Intact to light touch in 4 extremities    Cerebellar: There is no dysmetria on finger to nose testing.    Gait : deferred

## 2022-10-24 NOTE — CONSULT NOTE ADULT - SUBJECTIVE AND OBJECTIVE BOX
LSW 11 am LSW 11 am  [facility] SS:   Date: 10-24-22  Time:  9805  1a) Level of consciousness (0-3):   1b) Questions (0-2):   1c) Commands (0-2):   2  ) Gaze (0-2):   3  ) Visual field (0-3):   4  ) Facial palsy (0-3): ---------------------------1  Motor  5a) Left arm (0-4): ------------------------------------1  5b) Right arm (0-4):   6a) Left leg (0-4): ------------------------------------1    6b) Right leg (0-4):   7  ) Ataxia (0-2):   Sensory  8  ) Sensory (0-2): ------------------------------------1    Speech  9  ) Language (0-3):   10) Dysarthria (0-2):   Extinction  11) Extinction/inattention (0-2):     Total score: = 4    Patient was last seen well at 11 am  Prestroke Modified [place]:  0       Neurology consult    ABHAY STOCK 27y Female           HPI:  27-year-old female with history of hypothyroid and PE in pregnancy (at 5 months gestation in 2016), treated with Lovenox for duration of pregnancy, had negative hypercoaguable workup, AC discontinued after delivery. Presented to ED today stating she had dizziness with nausea this morning at 11am. She reports that these symptoms were followed by left sided weakness and numbness. Dizziness/nausea have resolved.  On arrival to CTH/CTA/CTP negative. NIHSS of 4, TPA administered at 13:55.     NIH: 4  mRs: 0        PMH: Abdominal Pain    Hypothyroidism    Hypothyroid    PE (pulmonary embolism)    Left knee pain, unspecified chronicity    Hypothyroid    History of pulmonary embolism         PSH: History of Appendectomy    S/P cholecystectomy    H/O dilation and curettage    S/P appendectomy    S/P cholecystectomy    H/O ovarian cystectomy    S/P appendectomy    S/P cholecystectomy    S/P ovarian cystectomy          FAMILY HISTORY:      SOCIAL HISTORY:  No history of tobacco or alcohol use     Allergies    No Known Allergies    Intolerances        Height (cm): 149.9 (10-24 @ 17:25)  Weight (kg): 72.6 (10-24 @ 13:50)  BMI (kg/m2): 32.3 (10-24 @ 17:25)    Vital Signs Last 24 Hrs  T(C): 36.7 (24 Oct 2022 20:35), Max: 36.8 (24 Oct 2022 13:55)  T(F): 98 (24 Oct 2022 20:35), Max: 98.3 (24 Oct 2022 16:25)  HR: 70 (24 Oct 2022 20:55) (60 - 89)  BP: 112/67 (24 Oct 2022 20:55) (92/69 - 153/78)  BP(mean): 80 (24 Oct 2022 20:55) (73 - 90)  RR: 19 (24 Oct 2022 20:55) (11 - 20)  SpO2: 100% (24 Oct 2022 20:55) (93% - 100%)    Parameters below as of 24 Oct 2022 19:55  Patient On (Oxygen Delivery Method): room air      MEDICATIONS    acetaminophen   IVPB .. 1000 milliGRAM(s) IV Intermittent once PRN  chlorhexidine 2% Cloths 1 Application(s) Topical daily  hydrALAZINE Injectable 10 milliGRAM(s) IV Push every 2 hours PRN  labetalol Injectable 10 milliGRAM(s) IV Push every 2 hours PRN        LABS:  CBC Full  -  ( 24 Oct 2022 14:02 )  WBC Count : 8.92 K/uL  RBC Count : 4.13 M/uL  Hemoglobin : 11.5 g/dL  Hematocrit : 34.5 %  Platelet Count - Automated : 282 K/uL  Mean Cell Volume : 83.5 fl  Mean Cell Hemoglobin : 27.8 pg  Mean Cell Hemoglobin Concentration : 33.3 gm/dL  Auto Neutrophil # : 6.66 K/uL  Auto Lymphocyte # : 1.36 K/uL  Auto Monocyte # : 0.72 K/uL  Auto Eosinophil # : 0.09 K/uL  Auto Basophil # : 0.03 K/uL  Auto Neutrophil % : 74.7 %  Auto Lymphocyte % : 15.2 %  Auto Monocyte % : 8.1 %  Auto Eosinophil % : 1.0 %  Auto Basophil % : 0.3 %      10-24    134<L>  |  100  |  11.0  ----------------------------<  95  4.3   |  25.0  |  0.41<L>    Ca    8.8      24 Oct 2022 14:02    TPro  7.2  /  Alb  4.4  /  TBili  0.2<L>  /  DBili  x   /  AST  20  /  ALT  27  /  AlkPhos  81  10-24    LIVER FUNCTIONS - ( 24 Oct 2022 14:02 )  Alb: 4.4 g/dL / Pro: 7.2 g/dL / ALK PHOS: 81 U/L / ALT: 27 U/L / AST: 20 U/L / GGT: x           Hemoglobin A1C:       PT/INR - ( 24 Oct 2022 14:02 )   PT: 12.6 sec;   INR: 1.09 ratio         PTT - ( 24 Oct 2022 14:02 )  PTT:27.9 sec      On Neurological Examination:    Mental Status - Patient is  awake, alert and oriented X3.  Speech is fluent. Patient can name, repeat and follow commands correctly  There is no dysarthria.    Cranial Nerves - PERRL, EOMI,  Visual fields are full to finger counting, Mild left central facial is noted.    Motor Exam -   Right upper ---5/5 No drift  Left upper ---5-/5 with drift  Right lower ---5/5 No drift  Left lower  ---5-/5 with drift     nml bulk/tone    Sensory    Intact to light touch and pinprick on the right and decreased on the left    Coord: FTN intact     Gait -  Not assessed.   [facility] SS:   Date: 10-24-22  Time:  1335  1a) Level of consciousness (0-3):   1b) Questions (0-2):   1c) Commands (0-2):   2  ) Gaze (0-2):   3  ) Visual field (0-3):   4  ) Facial palsy (0-3): ---------------------------1  Motor  5a) Left arm (0-4): ------------------------------------1  5b) Right arm (0-4):   6a) Left leg (0-4): ------------------------------------1    6b) Right leg (0-4):   7  ) Ataxia (0-2):   Sensory  8  ) Sensory (0-2): ------------------------------------1    Speech  9  ) Language (0-3):   10) Dysarthria (0-2):   Extinction  11) Extinction/inattention (0-2):     Total score: = 4    Patient was last seen well at 11 am  Prestroke Modified [place]:  0    RADIOLOGY ( All neurological imaging studies were independently reviewed and interpreted by me)  [facility]     < from: CT Brain Stroke Protocol (10.24.22 @ 13:38) >    IMPRESSION:  HEAD CT: No acute hemorrhage or midline shift.    Discussed with Dr. Anton in the ED at 1:44 PM.      KAREN JOE MD; Attending Radiologist    CTA  CTP   CT Angio Brain Stroke Protocol  w/ IV Cont (10.24.22 @ 13:53) >    IMPRESSION:    CT PERFUSION demonstrated: No core infarct. No active ischemia.  If symptomspersist consider follow up head CT or MRI, MRA  if no   contraindication.    CTA COW:  Patent intracranial circulation without flow limiting stenosis.    CTA NECK: Patent, ECAs, ICAs, no  hemodynamically significant stenosis at    ICA origins by NASCET criteria.  Bilateral vertebral arteries are patent without flow limiting stenosis.     Discussed with Dr. Anton in the ED at 1:57 PM.      KAREN JOE MD; Attending Radiologist    MRI:  TTE        LSW 11 am

## 2022-10-24 NOTE — H&P ADULT - NSHPLABSRESULTS_GEN_ALL_CORE
11.5   8.92  )-----------( 282      ( 24 Oct 2022 14:02 )             34.5       Comprehensive Metabolic Panel (10.24.22 @ 14:02)   Sodium, Serum: 134 mmol/L   Potassium, Serum: 4.3 mmol/L   Chloride, Serum: 100 mmol/L   Carbon Dioxide, Serum: 25.0 mmol/L   Anion Gap, Serum: 9 mmol/L   Blood Urea Nitrogen, Serum: 11.0 mg/dL   Creatinine, Serum: 0.41 mg/dL   Glucose, Serum: 95 mg/dL   Calcium, Total Serum: 8.8: Prior Reference Range of 8.6 – 10.2 was amended as of 7/26/2022 to 8.4 –   10.5. mg/dL   Protein Total, Serum: 7.2 g/dL   Albumin, Serum: 4.4 g/dL   Bilirubin Total, Serum: 0.2 mg/dL   Alkaline Phosphatase, Serum: 81 U/L   Aspartate Aminotransferase (AST/SGOT): 20 U/L   Alanine Aminotransferase (ALT/SGPT): 27 U/L       < from: CTH/CT Angio Head/Neck Stroke Protocol w/ IV Cont (10.24.22 @ 13:53) >      IMPRESSION:    CT PERFUSION demonstrated: No core infarct. No active ischemia.  If symptomspersist consider follow up head CT or MRI, MRA  if no   contraindication.    CTA COW:  Patent intracranial circulation without flow limiting stenosis.    CTA NECK: Patent, ECAs, ICAs, no  hemodynamically significant stenosis at    ICA origins by NASCET criteria.  Bilateral vertebral arteries are patent without flow limiting stenosis.

## 2022-10-24 NOTE — ED ADULT NURSE REASSESSMENT NOTE - NS ED NURSE REASSESS COMMENT FT1
Pt A&O x 4 reports improvement in symptoms. GCS 15. NIH 1. Denies complaints at this time. Bed locked and in lowest position. Frequent checks made. Will continue to monitor.

## 2022-10-24 NOTE — ED ADULT TRIAGE NOTE - ARRIVAL FROM
Hospital Medicine Daily Progress Note    Date of Service  9/6/2022    Chief Complaint  Andrea Clayton is a 75 y.o. male admitted 9/5/2022 with LGIB    Hospital Course  75 y.o. male  with a history of dyslipidemia, hypertension and paroxysmal atrial fibrillation.  He presented 9/5/2022 with after being seen at [facility] for acute bloody diarrhea throughout the night.  He was transferred to [facility] for higher level of care with potential need for interventional radiology.  Patient denies eating anything out of the ordinary he lives with his wife who is not ill.  The patient has not had any nausea vomiting nor abdominal pain.  He denies any constipation or straining recently.  He has not been on any antibiotics.  The patient is not a drinker of alcohol and he has not been on any NSAIDs.  He is not on any anticoagulants other than to daily baby aspirin.  The patient has never had a colonoscopy and [facility]  Patient denies any abnormal weight loss.  Patient states that last night he was awoken went to the bathroom and had bright red blood he had several episodes of controlled bloody diarrhea sometimes with clots.  This morning since this is ongoing he went to the [facility].  Initial hemoglobin was approximately 13 and on a repeat hemoglobin was down to 12.  The patient did receive IV fluids and also a CT abdomen pelvis which showed concern of active bleeding and potential mass versus thrombus in the sigmoid region.  He was transferred to [facility] for potential need of embolization with interventional radiology.  Patient is alert oriented and able to ambulate from bathroom to bed.  He did have one episode of lightheadedness after his most recent bowel movement here at [facility] with bloody diarrhea.    Interval Problem Update  Status post IR microcoil embolization of a distal sigmoid artery branch on 9/5  Patient was evaluated at bedside today  No  abdominal pain, hematochezia or melena.  Passing gas  Stable vitals and saturating well on room air  Hemoglobin stable at 9  Pending iron studies  Patient reporting history of atrial fibrillation on aspirin  [id number] >2, will need anticoagulation prior to discharge  Gastroenterology following, aim for colonoscopy on a.m.  Labs on a.m.    I have discussed this patient's plan of care and discharge plan at IDT rounds today with Case Management, Nursing, Nursing leadership, and other members of the IDT team.    Consultants/Specialty  Gastroenterology    Code Status  Full Code    Disposition  Patient is not medically cleared for discharge.   Anticipate discharge to to home with close outpatient follow-up.  I have placed the appropriate orders for post-discharge needs.    Review of Systems  Review of Systems   Constitutional:  Positive for weight loss.   HENT: Negative.     Eyes: Negative.    Respiratory: Negative.     Cardiovascular: Negative.    Gastrointestinal: Negative.    Genitourinary: Negative.    Musculoskeletal: Negative.    Skin: Negative.    Neurological:  Positive for weakness.   Endo/Heme/Allergies: Negative.    Psychiatric/Behavioral: Negative.        Physical Exam  Temp:  [36.1 °C (96.9 °F)-36.8 °C (98.2 °F)] 36.8 °C (98.2 °F)  Pulse:  [62-78] 67  Resp:  [15-21] 19  BP: ()/(36-73) 108/53  SpO2:  [92 %-99 %] 93 %    Physical Exam  Constitutional:       General: He is not in acute distress.     Appearance: Normal appearance.   HENT:      Head: Normocephalic and atraumatic.      Nose: Nose normal. No congestion.      Mouth/Throat:      Mouth: Mucous membranes are moist.   Eyes:      Extraocular Movements: Extraocular movements intact.      Pupils: Pupils are equal, round, and reactive to light.   Cardiovascular:      Rate and Rhythm: Normal rate and regular rhythm.      Pulses: Normal pulses.      Heart sounds: Normal heart sounds.   Pulmonary:      Effort: Pulmonary effort is normal.      Breath  sounds: Normal breath sounds.   Abdominal:      General: Bowel sounds are normal. There is no distension.      Palpations: Abdomen is soft.      Tenderness: There is no abdominal tenderness. There is no guarding or rebound.   Musculoskeletal:         General: No swelling. Normal range of motion.      Cervical back: Normal range of motion and neck supple.   Skin:     General: Skin is warm.      Coloration: Skin is not jaundiced.   Neurological:      General: No focal deficit present.      Mental Status: He is alert and oriented to person, place, and time. Mental status is at baseline.      Cranial Nerves: No cranial nerve deficit.   Psychiatric:         Mood and Affect: Mood normal.         Behavior: Behavior normal.         Thought Content: Thought content normal.         Judgment: Judgment normal.       Fluids    Intake/Output Summary (Last 24 hours) at 9/6/2022 1354  Last data filed at 9/6/2022 0432  Gross per 24 hour   Intake --   Output 220 ml   Net -220 ml       Laboratory  Recent Labs     09/05/22  1601 09/06/22  0016 09/06/22  0758   WBC 11.7* 11.2*  --    RBC 3.49* 2.92*  --    HEMOGLOBIN 10.9* 9.2* 9.8*   HEMATOCRIT 32.4* 27.7*  --    MCV 92.8 94.9  --    MCH 31.2 31.5  --    MCHC 33.6* 33.2*  --    RDW 44.8 47.3  --    PLATELETCT 258 242  --    MPV 8.5* 8.6*  --      Recent Labs     09/05/22  1601 09/06/22  0016 09/06/22  1026   SODIUM 134* 131* 134*   POTASSIUM 3.8 4.1 4.0   CHLORIDE 100 101 101   CO2 23 21 22   GLUCOSE 130* 107* 142*   BUN 13 14 12   CREATININE 0.59 0.56 0.62   CALCIUM 8.7 8.4* 8.5     Recent Labs     09/05/22  1601   INR 1.08               Imaging  OUTSIDE IMAGES-CT ABDOMEN /PELVIS   Final Result      IR-VISCERAL ANGIOGRAM - SELECTIVE    (Results Pending)        Assessment/Plan  * Lower GI bleed- (present on admission)  Assessment & Plan  [facility] CTA abdomen and pelvis uploaded to [facility] system.  Status post IR microcoil embolization of a distal sigmoid artery branch on 9/5  Clear liquid  diet, n.p.o. midnight  Hold chemical anticoagulation and antiplatelets for now  GI following, aim for colonoscopy on a.m.  Labs on a.m.    Atrial fibrillation (HCC)- (present on admission)  Assessment & Plan  As per patient  Continue metoprolol  Chest vascular higher than 2, will likely need anticoagulation prior to discharge  Request [facility] records    Dyslipidemia- (present on admission)  Assessment & Plan  Patient is currently n.p.o. once able to take orals will restart his statin    Hypertension- (present on admission)  Assessment & Plan  Due to potential blood loss and potential for hypotension I am holding off on lisinopril metoprolol for now  Monitor vitals       VTE prophylaxis: SCDs/TEDs    I have performed a physical exam and reviewed and updated ROS and Plan today (9/6/2022). In review of yesterday's note (9/5/2022), there are no changes except as documented above.         Home

## 2022-10-24 NOTE — ED PROVIDER NOTE - CLINICAL SUMMARY MEDICAL DECISION MAKING FREE TEXT BOX
pt with dizziness and unilateral   Weakness.  NIH score of 4.  Code stroke called.  Will obtain stroke work-up and consult stroke team for further management.

## 2022-10-24 NOTE — H&P ADULT - NSHPREVIEWOFSYSTEMS_GEN_ALL_CORE
Constitutional:  Pain well controlled with PRNs, no fevers, chills, or new weakness  Eyes: No double vision, no change in visual acuity, no blurry vision, no occular discharge  Neurologic: +left sided weakness, + left sided numbness, no seizure reported, headaches well controlled with PRN medications  Ears, nose, mouth throat:  No ottorrhea. No change in hearing, No anosmia, No oral lesions, No sore throat  Cardiovascular: No palpitations, no chest pain, no nocturnal or positional dyspnea.  Respiratory: No shortness of breath, No Cough  Gastrointestinal: No change in bowel habits, no change in appetite  Genitourinary: No Frequency, No Dysuria, no Hematuria  Musculoskeletal: No muscle wasting, No new weakness  Psych: No changes in mood, Denies drug use, Denies history of psychiatric illness  Integumentary: Denies new skin lesions  Endocrine: Denies history of DM, denies history of thyroid disease, No heat or cold intolerance  Heme/Lymph: No use of antiplatelet/anticoagulant  Allergic / Immune: No active allergies unless otherwise specified in medical chart    All other systems reviewed and are negative

## 2022-10-24 NOTE — ED PROVIDER NOTE - NSICDXPASTMEDICALHX_GEN_ALL_CORE_FT
PAST MEDICAL HISTORY:  Abdominal Pain     History of pulmonary embolism 23 weeks gestation on VQ scan Had lovenox till delivery    Hypothyroid     Hypothyroid     Hypothyroidism     Left knee pain, unspecified chronicity     PE (pulmonary embolism)

## 2022-10-24 NOTE — H&P ADULT - CRITICAL CARE ATTENDING COMMENT
27-year-old female with stroke-like Sx, received tPA. NIH 4, mRS 0.  No LVO - not a candidate for endovascular intervention.  PMH of hypothyroid and PE in pregnancy (at 5 months gestation in 2016), not on AC now.  Plan:  admit to NSICU   neurochecks, stroke protocol  stroke core measures  stability CTh in 24 hrs  maintain -180 and DBP<105  hold ASA/ppx Ac for now as within first 24 hrs post-tPA, re-eval in 24 hrs  recheck TSH and T4 in am  rest as above 27-year-old female with stroke-like Sx, received tPA. NIH 4, mRS 0.  No LVO - not a candidate for endovascular intervention.  PMH of hypothyroid and PE in pregnancy (at 5 months gestation in 2016), not on AC now.  Plan:  admit to NSICU   neurochecks, stroke protocol  stroke core measures  stability CTh in 24 hrs  maintain -180 and DBP<105  hold ASA/ppx Ac for now as within first 24 hrs post-tPA, re-eval in 24 hrs  recheck TSH and T4 in am  rest as above      Pt is critically ill and at high risk of rapid deterioration/death due to abovementioned conditions.   Still requires critical care interventions - ongoing frequent evaluations, interventions and management adjustment by the Attending and ICU team, - and included review of relevant history, clinical examination, review of data and images, discussion of treatment with the multidisciplinary team and any consultants involved in this patient’s care.

## 2022-10-24 NOTE — H&P ADULT - ASSESSMENT
27-year-old female with history of hypothyroid and PE in pregnancy (at 5 months gestation in 2016), treated with Lovenox for duration of pregnancy, had negative hypercoaguable workup, AC discontinued after delivery. Presented to ED today stating she had dizziness with nausea this morning at 11am. She reports that these symptoms were followed by left sided weakness and numbness. Dizziness/nausea have resolved.  On arrival to CTH/CTA/CTP negative. NIHSS of 4, TPA administered at 13:55.     NIH: 4  mRs: 0    Neuro:  	Q1 hour Neuro checks, Q1 hour Vitals  	HOB 30 degrees, Neck midline position  	Maintain normothermia, PO acetaminophen for temp>38 C or pain  	Post TPA CTH in 24 hours  	Pain management: Avoid sedation  	Bed rest 12 hours post TPA              Stroke Core Measures: HgA1c, LDL, ASA tomorrow after stability CTH, Statin prior to discharge              Will send hypercoagulable labs              MRI              PT/OT/ P, M & R tomorrow                  	  CV:  	SBP Goal 120- 180 post TPA, PRN Hydralazine and Labetolol  	Check lipid profile              EKG/TTE pending     Pulm:  	  	Supplemental O2 PRN to maintain Spo2>92%  	Chest PT, OOB, Pulmonary Toilet    GI:  	Nutrition: NPO post TPA  	Bowel regimen when able  	  Gu:  	Voiding   	I&O Q1 hour  	Monitor Electrolytes & Renal Function    Heme:  	Monitor H&H  	Chemical DVT prophylaxis is contraindicated due to risk of bleeding s/p TPA  	Mechanical DVT Prophylaxis: Maintain B/L LE sequential compression devices  	Check Lower Extremity Doppler for hx PE  			  ID:  	Monitor WBC and Temperature  	  Endo              TSH elevated- recheck in am, check T4, home Synthroid 75 mcg continued              Check A1C   	Monitor BGL, maintain <180  	CINDY   		100-150 no correction  		150-200  2units  		200-250  4units  		250-300	 6units  		300-350  8units  		350-400  10units  		400+	12units

## 2022-10-24 NOTE — ED PROVIDER NOTE - NEUROLOGICAL, MLM
Alert and oriented x 3. 4/5 motor and sensationon LUE and RUE. mild left lower facial droop with decreased sensation on left face. FROm neck. normal flinger to nose and heel to shin. CN 2-12 otherwise intact

## 2022-10-24 NOTE — ED PROVIDER NOTE - PROGRESS NOTE DETAILS
AJM: case presented to stroke neurologist dr ballard who agrees with stroke workup and will see pt for further plan Patient seen by stroke team who recommended tPA administration.  Patient given tPA.  Case presented to the neurologic ICU who will accept patient for admission.  Vital signs stable.

## 2022-10-24 NOTE — ED PROVIDER NOTE - NSICDXPASTSURGICALHX_GEN_ALL_CORE_FT
PAST SURGICAL HISTORY:  H/O dilation and curettage     H/O ovarian cystectomy     History of Appendectomy     S/P appendectomy     S/P appendectomy 2001    S/P cholecystectomy 2013    S/P cholecystectomy     S/P cholecystectomy     S/P ovarian cystectomy 2014

## 2022-10-24 NOTE — ED PROVIDER NOTE - OBJECTIVE STATEMENT
27-year-old female with history of hypothyroid and PE in pregnancy not on AC, presenting with room spinning dizziness nausea syncope.  symptoms began around 11:00 AM.  No nausea vomiting.  Patient notes she feels slightly weaker on the left side of her body with tingling as well occluding the left face arm and leg.  No prior history of strokes no trauma, no urinary complaints, no recent numbness.  No rashes.  No history of similar symptoms.  Patient has presented to ED for dizziness in the past but notes that this feels different.  No meds for symptoms.  Patient notes slight shortness of breath but no chest pain or back pain.

## 2022-10-25 ENCOUNTER — TRANSCRIPTION ENCOUNTER (OUTPATIENT)
Age: 27
End: 2022-10-25

## 2022-10-25 DIAGNOSIS — I63.9 CEREBRAL INFARCTION, UNSPECIFIED: ICD-10-CM

## 2022-10-25 LAB
A1C WITH ESTIMATED AVERAGE GLUCOSE RESULT: 5.4 % — SIGNIFICANT CHANGE UP (ref 4–5.6)
ANION GAP SERPL CALC-SCNC: 13 MMOL/L — SIGNIFICANT CHANGE UP (ref 5–17)
APCR PPP: 3 RATIO — SIGNIFICANT CHANGE UP
AT III ACT/NOR PPP CHRO: 118 % — SIGNIFICANT CHANGE UP (ref 85–135)
B2 GLYCOPROT1 AB SER QL: NEGATIVE — SIGNIFICANT CHANGE UP
BUN SERPL-MCNC: 10.2 MG/DL — SIGNIFICANT CHANGE UP (ref 8–20)
CALCIUM SERPL-MCNC: 9 MG/DL — SIGNIFICANT CHANGE UP (ref 8.4–10.5)
CARDIOLIPIN AB SER-ACNC: NEGATIVE — SIGNIFICANT CHANGE UP
CHLORIDE SERPL-SCNC: 101 MMOL/L — SIGNIFICANT CHANGE UP (ref 98–107)
CHOLEST SERPL-MCNC: 161 MG/DL — SIGNIFICANT CHANGE UP
CO2 SERPL-SCNC: 22 MMOL/L — SIGNIFICANT CHANGE UP (ref 22–29)
CREAT SERPL-MCNC: 0.44 MG/DL — LOW (ref 0.5–1.3)
DRVVT SCREEN TO CONFIRM RATIO: SIGNIFICANT CHANGE UP
EGFR: 135 ML/MIN/1.73M2 — SIGNIFICANT CHANGE UP
ESTIMATED AVERAGE GLUCOSE: 108 MG/DL — SIGNIFICANT CHANGE UP (ref 68–114)
GLUCOSE BLDC GLUCOMTR-MCNC: 100 MG/DL — HIGH (ref 70–99)
GLUCOSE SERPL-MCNC: 90 MG/DL — SIGNIFICANT CHANGE UP (ref 70–99)
HCT VFR BLD CALC: 38 % — SIGNIFICANT CHANGE UP (ref 34.5–45)
HCYS SERPL-MCNC: 4.8 UMOL/L — SIGNIFICANT CHANGE UP
HDLC SERPL-MCNC: 35 MG/DL — LOW
HGB BLD-MCNC: 12.2 G/DL — SIGNIFICANT CHANGE UP (ref 11.5–15.5)
LA NT DPL PPP QL: 33.6 SEC — SIGNIFICANT CHANGE UP
LIPID PNL WITH DIRECT LDL SERPL: 103 MG/DL — HIGH
MAGNESIUM SERPL-MCNC: 2.1 MG/DL — SIGNIFICANT CHANGE UP (ref 1.6–2.6)
MCHC RBC-ENTMCNC: 27.2 PG — SIGNIFICANT CHANGE UP (ref 27–34)
MCHC RBC-ENTMCNC: 32.1 GM/DL — SIGNIFICANT CHANGE UP (ref 32–36)
MCV RBC AUTO: 84.8 FL — SIGNIFICANT CHANGE UP (ref 80–100)
MRSA PCR RESULT.: SIGNIFICANT CHANGE UP
NON HDL CHOLESTEROL: 126 MG/DL — SIGNIFICANT CHANGE UP
PHOSPHATE SERPL-MCNC: 3.4 MG/DL — SIGNIFICANT CHANGE UP (ref 2.4–4.7)
PLATELET # BLD AUTO: 239 K/UL — SIGNIFICANT CHANGE UP (ref 150–400)
POTASSIUM SERPL-MCNC: 3.9 MMOL/L — SIGNIFICANT CHANGE UP (ref 3.5–5.3)
POTASSIUM SERPL-SCNC: 3.9 MMOL/L — SIGNIFICANT CHANGE UP (ref 3.5–5.3)
PROT C ACT/NOR PPP: 210 % — HIGH (ref 74–150)
RBC # BLD: 4.48 M/UL — SIGNIFICANT CHANGE UP (ref 3.8–5.2)
RBC # FLD: 13.3 % — SIGNIFICANT CHANGE UP (ref 10.3–14.5)
S AUREUS DNA NOSE QL NAA+PROBE: DETECTED
SODIUM SERPL-SCNC: 136 MMOL/L — SIGNIFICANT CHANGE UP (ref 135–145)
T4 AB SER-ACNC: 9.7 UG/DL — SIGNIFICANT CHANGE UP (ref 4.5–12)
TRIGL SERPL-MCNC: 113 MG/DL — SIGNIFICANT CHANGE UP
TSH SERPL-MCNC: 12.27 UIU/ML — HIGH (ref 0.27–4.2)
WBC # BLD: 6.75 K/UL — SIGNIFICANT CHANGE UP (ref 3.8–10.5)
WBC # FLD AUTO: 6.75 K/UL — SIGNIFICANT CHANGE UP (ref 3.8–10.5)

## 2022-10-25 PROCEDURE — 99233 SBSQ HOSP IP/OBS HIGH 50: CPT

## 2022-10-25 PROCEDURE — 99232 SBSQ HOSP IP/OBS MODERATE 35: CPT

## 2022-10-25 PROCEDURE — 70551 MRI BRAIN STEM W/O DYE: CPT | Mod: 26

## 2022-10-25 PROCEDURE — 99223 1ST HOSP IP/OBS HIGH 75: CPT

## 2022-10-25 PROCEDURE — 70450 CT HEAD/BRAIN W/O DYE: CPT | Mod: 26

## 2022-10-25 PROCEDURE — 93010 ELECTROCARDIOGRAM REPORT: CPT

## 2022-10-25 PROCEDURE — 99291 CRITICAL CARE FIRST HOUR: CPT

## 2022-10-25 PROCEDURE — 99222 1ST HOSP IP/OBS MODERATE 55: CPT | Mod: 25

## 2022-10-25 RX ORDER — LEVOTHYROXINE SODIUM 125 MCG
175 TABLET ORAL DAILY
Refills: 0 | Status: DISCONTINUED | OUTPATIENT
Start: 2022-10-25 | End: 2022-10-25

## 2022-10-25 RX ORDER — LEVOTHYROXINE SODIUM 125 MCG
75 TABLET ORAL DAILY
Refills: 0 | Status: DISCONTINUED | OUTPATIENT
Start: 2022-10-26 | End: 2022-10-26

## 2022-10-25 RX ORDER — POLYETHYLENE GLYCOL 3350 17 G/17G
17 POWDER, FOR SOLUTION ORAL DAILY
Refills: 0 | Status: DISCONTINUED | OUTPATIENT
Start: 2022-10-25 | End: 2022-10-26

## 2022-10-25 RX ORDER — MUPIROCIN 20 MG/G
1 OINTMENT TOPICAL
Refills: 0 | Status: DISCONTINUED | OUTPATIENT
Start: 2022-10-25 | End: 2022-10-25

## 2022-10-25 RX ORDER — ENOXAPARIN SODIUM 100 MG/ML
40 INJECTION SUBCUTANEOUS EVERY 24 HOURS
Refills: 0 | Status: DISCONTINUED | OUTPATIENT
Start: 2022-10-25 | End: 2022-10-26

## 2022-10-25 RX ORDER — ACETAMINOPHEN 500 MG
1000 TABLET ORAL ONCE
Refills: 0 | Status: COMPLETED | OUTPATIENT
Start: 2022-10-25 | End: 2022-10-25

## 2022-10-25 RX ORDER — ATORVASTATIN CALCIUM 80 MG/1
80 TABLET, FILM COATED ORAL AT BEDTIME
Refills: 0 | Status: DISCONTINUED | OUTPATIENT
Start: 2022-10-25 | End: 2022-10-26

## 2022-10-25 RX ORDER — INFLUENZA VIRUS VACCINE 15; 15; 15; 15 UG/.5ML; UG/.5ML; UG/.5ML; UG/.5ML
0.5 SUSPENSION INTRAMUSCULAR ONCE
Refills: 0 | Status: DISCONTINUED | OUTPATIENT
Start: 2022-10-25 | End: 2022-10-26

## 2022-10-25 RX ORDER — SENNA PLUS 8.6 MG/1
1 TABLET ORAL DAILY
Refills: 0 | Status: DISCONTINUED | OUTPATIENT
Start: 2022-10-25 | End: 2022-10-26

## 2022-10-25 RX ORDER — LEVOTHYROXINE SODIUM 125 MCG
75 TABLET ORAL DAILY
Refills: 0 | Status: DISCONTINUED | OUTPATIENT
Start: 2022-10-25 | End: 2022-10-25

## 2022-10-25 RX ORDER — ASPIRIN/CALCIUM CARB/MAGNESIUM 324 MG
81 TABLET ORAL DAILY
Refills: 0 | Status: DISCONTINUED | OUTPATIENT
Start: 2022-10-25 | End: 2022-10-26

## 2022-10-25 RX ADMIN — ATORVASTATIN CALCIUM 80 MILLIGRAM(S): 80 TABLET, FILM COATED ORAL at 23:08

## 2022-10-25 RX ADMIN — ENOXAPARIN SODIUM 40 MILLIGRAM(S): 100 INJECTION SUBCUTANEOUS at 23:08

## 2022-10-25 RX ADMIN — Medication 400 MILLIGRAM(S): at 09:06

## 2022-10-25 RX ADMIN — Medication 175 MICROGRAM(S): at 06:54

## 2022-10-25 RX ADMIN — CHLORHEXIDINE GLUCONATE 1 APPLICATION(S): 213 SOLUTION TOPICAL at 11:09

## 2022-10-25 RX ADMIN — Medication 1000 MILLIGRAM(S): at 09:36

## 2022-10-25 NOTE — CONSULT NOTE ADULT - NS ATTEND AMEND GEN_ALL_CORE FT
Chaperone: Martina Curry NP  Patient seen and examined by me.  I have discussed my recommendation with the PA which are outlined above.  Will follow.

## 2022-10-25 NOTE — CONSULT NOTE ADULT - ASSESSMENT
27-year-old female with history of hypothyroid and PE in pregnancy (at 5 months gestation in 2016), treated with Lovenox for duration of pregnancy, had negative hypercoagulable workup outpatient.   AC discontinued after delivery.     Presented to ED today for dizziness with nausea yesterday at 11am.  These symptoms were followed by left sided weakness and numbness.  Patient  TPA administered yesterday 13:55.  Patient had symptom relief after TPA.  A cardiac consult was called due to new stroke with unknown etiology,   Denies dizziness, headache syncope, presyncope chest pain, palpitations, cough sob, pnd, orthopnea n/v/d, constipation weakness or muscle cramps

## 2022-10-25 NOTE — CONSULT NOTE ADULT - ASSESSMENT
27-year-old female with history of hypothyroid and PE in pregnancy (at 5 months gestation in 2016), treated with Lovenox for duration of pregnancy, had negative hypercoaguable workup, AC discontinued after delivery. Presented to ED today stating she had dizziness with nausea this morning at 11am. She reports that these symptoms were followed by left sided weakness and numbness. Dizziness/nausea have resolved.  On arrival to CTH/CTA/CTP negative. NIHSS of 4, TPA administered at 13:55.   Has h/o hypothyroidism, on replacement, currently on levothyroxine 175 mcg daily.  Her labs showed TSH of 5 and repeat next day 12.  Endo consulted for same.      Hypothyroidism:  TSH fluctuating from 5 to 12.  Has been on levothyroxine same dose for sometime, when patient is sick,   there is element of sick euthyroid, so will recommend to continue same dose for   now and repeat levels as out patient in 4 to 6 weeks and then make dose adjust ment.    CVA s/p TPA:  mangement per primary team.   27-year-old female with history of hypothyroid and PE in pregnancy (at 5 months gestation in 2016), treated with Lovenox for duration of pregnancy, had negative hypercoaguable workup, AC discontinued after delivery. Presented to ED today stating she had dizziness with nausea this morning at 11am. She reports that these symptoms were followed by left sided weakness and numbness. Dizziness/nausea have resolved.  On arrival to CTH/CTA/CTP negative. NIHSS of 4, TPA administered at 13:55.   Has h/o hypothyroidism, on replacement, currently on levothyroxine 175 mcg daily.  Her labs showed TSH of 5 and repeat next day 12.  Endo consulted for same.  She was not taking her home meds for about 5 months, just restarted recently , few weeks ago.    Hypothyroidism:  TSH fluctuating from 5 to 12.  She recently restarted  levothyroxine , only been on it for a few weeks and also  when patient is sick,   there is element of sick euthyroid, so will recommend to continue same dose for   now  and repeat levels as out patient in 4 to 6 weeks and then make dose adjust ment.  discussed with patient not to miss her doses, take it empty stomach.    CVA s/p TPA:  management per primary team.

## 2022-10-25 NOTE — PROGRESS NOTE ADULT - ASSESSMENT
27-year-old female with history of hypothyroid and PE in pregnancy (at 5 months gestation in 2016), treated with Lovenox for duration of pregnancy, had negative hypercoaguable workup, AC discontinued after delivery. Presented to ED today stating she had dizziness with nausea this morning at 11am. She reports that these symptoms were followed by left sided weakness and numbness. Dizziness/nausea have resolved.  On arrival to CTH/CTA/CTP negative. NIHSS of 4, TPA administered at 13:55.     NIH: 4  mRs: 0    Neuro:  	Q1 hour Neuro checks, Q1 hour Vitals  	HOB 30 degrees, Neck midline position  	Maintain normothermia, PO acetaminophen for temp>38 C or pain  	Post TPA CTH in 24 hours  	Pain management: Avoid sedation              Stroke Core Measures: HgA1c, LDL, ASA tomorrow after stability CTH, Statin prior to discharge              follow up hypercoagulable labs              MRI              PT/OT/ P, M & R tomorrow                  	  CV:  	SBP Goal 120- 180 post TPA, PRN Hydralazine and Labetolol  	Check lipid profile              EKG/TTE pending     Pulm:  	  	Supplemental O2 PRN to maintain Spo2>92%  	Chest PT, OOB, Pulmonary Toilet    GI:  	Nutrition: adv as tolerated  	Bowel regimen when able  	  Gu:  	Voiding   	I&O Q1 hour  	Monitor Electrolytes & Renal Function    Heme:  	Monitor H&H  	Chemical DVT prophylaxis is contraindicated due to risk of bleeding s/p TPA  	Mechanical DVT Prophylaxis: Maintain B/L LE sequential compression devices  	Check Lower Extremity Doppler for hx PE  			  ID:  	Monitor WBC and Temperature  	  Endo              TSH elevated- recheck in am, check T4, home Synthroid 75 mcg continued              Check A1C   	Monitor BGL, maintain <180  	CINDY   		100-150 no correction  		150-200  2units  		200-250  4units  		250-300	 6units  		300-350  8units  		350-400  10units  		400+	12units      27-year-old female with history of hypothyroid and PE in pregnancy (at 5 months gestation in 2016), treated with Lovenox for duration of pregnancy, had negative hypercoaguable workup, AC discontinued after delivery. Presented to ED today stating she had dizziness with nausea this morning at 11am. She reports that these symptoms were followed by left sided weakness and numbness. Dizziness/nausea have resolved.  On arrival to CTH/CTA/CTP negative. NIHSS of 4, TPA administered at 13:55.   NIH: 4  mRs: 0    Neuro:	Q1 hour Neuro checks, Q1 hour Vitals  	HOB 30 degrees, Neck midline position  	Maintain normothermia, PO acetaminophen for temp>38 C or pain  	Post TPA CTH in 24 hours, MRI brain  	Pain management: Avoid sedation              Stroke Core Measures: ASA after stability CTH, Statin prior to discharge              follow up hypercoagulable labs              consult neurology              PT/OT/ P, M & R tomorrow               	  CV:	SBP Goal 120- 180 post TPA, PRN Hydralazine and Labetolol              EKG/TTE pending               cardiology consult for cryptogenic stroke    Pulm:     room air	  	Supplemental O2 PRN to maintain Spo2>92%  	Chest PT, OOB, Pulmonary Toilet    GI:	Nutrition: adv as tolerated  	Bowel regimen, LBM 10/24  	  Gu:	Voiding   	I&O Q1 hour  	Monitor Electrolytes & Renal Function    Heme:	Monitor H&H  	Chemical DVT prophylaxis is contraindicated due to risk of bleeding s/p TPA  	Mechanical DVT Prophylaxis: Maintain B/L LE sequential compression devices  	Lower Extremity Doppler- negative  			  ID:	Monitor WBC and Temperature  	  Endo    Endo consulted              TSH elevated- check T4, home Synthroid 75 mcg continued              A1C 5.4   	Monitor BGL, maintain <180  	CINDY

## 2022-10-25 NOTE — SPEECH LANGUAGE PATHOLOGY EVALUATION - COMMENTS
As per MD note: "27-year-old female with history of hypothyroid and PE in pregnancy (at 5 months gestation in 2016), treated with Lovenox for duration of pregnancy, had negative hypercoaguable workup, AC discontinued after delivery. Presented to ED 10/24/22 stating she had dizziness with nausea this morning since 11am. She reported that these symptoms were followed by left sided weakness and numbness. Dizziness/nausea have resolved.  On arrival to CTH/CTA/CTP negative. NIHSS of 4, TPA administered at 13:55 10/24/22." WFL Will follow for completion of formal cognitive evaluation Periods of delayed responses noted with more complex tasks    Formal assessment to be completed

## 2022-10-25 NOTE — PROGRESS NOTE ADULT - ASSESSMENT
ASSESSMENT: 27-year-old female with history of hypothyroid and PE in pregnancy (at 5 months gestation in 2016), treated with Lovenox for duration of pregnancy, had negative hypercoaguable workup, AC discontinued after delivery. Presented to ED 10/24/22 stating she had dizziness with nausea this morning since 11am. She reported that these symptoms were followed by left sided weakness and numbness. Dizziness/nausea have resolved.  On arrival to CTH/CTA/CTP negative. NIHSS of 4, TPA administered at 13:55 10/24/22.      IMPRESSION:  -S/P  iv tPA  R/O right hemispheric infarct    NEURO: neurologically aside from reported fatigue appears improved, Continue close monitoring for neurologic deterioration, permissive HTN up to 180mmHg with gradual normotension as tolerated, statin not indicated as non atherosclerotic etiology- diet/lifestyle modifications,, MRI Brain w/o pending,  Physical therapy/OT eval pending.     ANTITHROMBOTIC THERAPY: ASA 81mg daily post 24 hour TPA protocol (TPA given 13:55 on 10/24/22) permitting repeat CTH prior without evidence of hemorrhage.     -check TTE, cardiac monitoring to ensure no occult arrhythmia                               SBP goal: 100-120mmHg being clinically tolerated at this time, would avoid further hypotension, gradual normotension as tolerated     -dysphagia screen passed, advance diet as tolerated       -avoid hyponatremia     -she has a significant history of VTE during pregnancy which she was placed on FD LMWH at the time and it was subsequently d/c after pregnancy given resolution per patient, she was following with Dr. Sanchez but has not followed up, she was at work at the time of her last apt. It was strongly encouraged she follow up. Would repeat hypercoagulable panel.   -LE duplex without evidence of DVT      DVT ppx: SCD for now, can initiate pharmacological dvt ppx post 24 hour TPA protocol (TPA given 13:55 on 10/24/22) permitting repeat CTH prior without evidence of hemorrhage.     -Endocrine follow up for further care of hypothyroid    DISPOSITION: Rehab or home depending on PT eval once stable and workup is complete      CORE MEASURES:        Admission NIHSS: 4     TPA: [x] YES [] NO      LDL/HDL: 103/35     Depression Screen: p     Statin Therapy: as noted      Dysphagia Screen: [x] PASS [] FAIL     Smoking [] YES [x] NO      Afib [] YES [x] NO     Stroke Education [x] YES [] NO    Obtain screening lower extremity venous ultrasound in patients who meet 1 or more of the following criteria as patient is high risk for DVT/PE on admission:   [x] History of DVT/PE  []Hypercoagulable states (Factor V Leiden, Cancer, OCP, etc. )  []Prolonged immobility (hemiplegia/hemiparesis/post operative or any other extended immobilization)  [] Transferred from outside facility (Rehab or Long term care)  [x] Age </= to 50

## 2022-10-25 NOTE — PROGRESS NOTE ADULT - SUBJECTIVE AND OBJECTIVE BOX
Chief complaint:   Patient is a 27y old  Female who presents with a chief complaint of CVA s/p TPA (24 Oct 2022 15:37)    HPI:  27-year-old female with history of hypothyroid and PE in pregnancy (at 5 months gestation in 2016), treated with Lovenox for duration of pregnancy, had negative hypercoaguable workup, AC discontinued after delivery. Presented to ED today stating she had dizziness with nausea this morning at 11am. She reports that these symptoms were followed by left sided weakness and numbness. Dizziness/nausea have resolved.  On arrival to CTH/CTA/CTP negative. NIHSS of 4, TPA administered at 13:55.     NIH: 4  mRs: 0    NIHSS = 4    24hr EVENTS:      ROS: [ ]  Unable to assess due to mental status   All other systems negative    -----------------------------------------------------------------------------------------------------------------------------------------------------------------------------------  ICU Vital Signs Last 24 Hrs  T(C): 37 (25 Oct 2022 08:02), Max: 37 (25 Oct 2022 08:02)  T(F): 98.6 (25 Oct 2022 08:02), Max: 98.6 (25 Oct 2022 08:02)  HR: 81 (25 Oct 2022 06:55) (55 - 89)  BP: 102/73 (25 Oct 2022 06:55) (92/69 - 153/78)  BP(mean): 83 (25 Oct 2022 06:55) (67 - 90)  ABP: --  ABP(mean): --  RR: 11 (25 Oct 2022 06:55) (11 - 21)  SpO2: 99% (25 Oct 2022 06:55) (93% - 100%)    O2 Parameters below as of 25 Oct 2022 03:55  Patient On (Oxygen Delivery Method): room air            I&O's Summary    24 Oct 2022 07:01  -  25 Oct 2022 07:00  --------------------------------------------------------  IN: 100 mL / OUT: 700 mL / NET: -600 mL    25 Oct 2022 07:01  -  25 Oct 2022 09:12  --------------------------------------------------------  IN: 240 mL / OUT: 140 mL / NET: 100 mL        MEDICATIONS  (STANDING):  chlorhexidine 2% Cloths 1 Application(s) Topical daily  influenza   Vaccine 0.5 milliLiter(s) IntraMuscular once  levothyroxine 175 MICROGram(s) Oral daily  polyethylene glycol 3350 17 Gram(s) Oral daily  senna 1 Tablet(s) Oral daily      RESPIRATORY:        IMAGING:   Recent imaging studies were reviewed.    LAB RESULTS:                          12.2   6.75  )-----------( 239      ( 25 Oct 2022 02:40 )             38.0       PT/INR - ( 24 Oct 2022 14:02 )   PT: 12.6 sec;   INR: 1.09 ratio         PTT - ( 24 Oct 2022 14:02 )  PTT:27.9 sec    10-25    136  |  101  |  10.2  ----------------------------<  90  3.9   |  22.0  |  0.44<L>    Ca    9.0      25 Oct 2022 02:40  Phos  3.4     10-25  Mg     2.1     10-25    TPro  7.2  /  Alb  4.4  /  TBili  0.2<L>  /  DBili  x   /  AST  20  /  ALT  27  /  AlkPhos  81  10-24                -----------------------------------------------------------------------------------------------------------------------------------------------------------------------------------    PHYSICAL EXAM:  General: Calm, laying in bed  HEENT: MMM  Neuro:  -Mental status- No acute distress, AOx3, conversational, following commands  -CN- PERRL 3mm, EOMI, tongue midline, face symmetric  -Motor- full strength in all ext  -Sensation- intact to LT   -Coordination- no dysmetria noted    CV:   Pulm: Clear to auscultation  Abd: Soft, nontender, nondistended  Ext: No edema  Skin: warm, dry     Chief complaint:   Patient is a 27y old  Female who presents with a chief complaint of CVA s/p TPA (24 Oct 2022 15:37)    HPI:  27-year-old female with history of hypothyroid and PE in pregnancy (at 5 months gestation in 2016), treated with Lovenox for duration of pregnancy, had negative hypercoaguable workup, AC discontinued after delivery. Presented to ED today stating she had dizziness with nausea this morning at 11am. She reports that these symptoms were followed by left sided weakness and numbness. Dizziness/nausea have resolved.  On arrival to CTH/CTA/CTP negative. NIHSS of 4, TPA administered at 13:55.     NIH: 4  mRs: 0    NIHSS = 4    24hr EVENTS:  s/p tpa, resolution of symptoms    ROS: no complaints  All other systems negative    -----------------------------------------------------------------------------------------------------------------------------------------------------------------------------------  ICU Vital Signs Last 24 Hrs  T(C): 37 (25 Oct 2022 08:02), Max: 37 (25 Oct 2022 08:02)  T(F): 98.6 (25 Oct 2022 08:02), Max: 98.6 (25 Oct 2022 08:02)  HR: 81 (25 Oct 2022 06:55) (55 - 89)  BP: 102/73 (25 Oct 2022 06:55) (92/69 - 153/78)  BP(mean): 83 (25 Oct 2022 06:55) (67 - 90)  ABP: --  ABP(mean): --  RR: 11 (25 Oct 2022 06:55) (11 - 21)  SpO2: 99% (25 Oct 2022 06:55) (93% - 100%)    O2 Parameters below as of 25 Oct 2022 03:55  Patient On (Oxygen Delivery Method): room air    I&O's Summary    24 Oct 2022 07:01  -  25 Oct 2022 07:00  --------------------------------------------------------  IN: 100 mL / OUT: 700 mL / NET: -600 mL    25 Oct 2022 07:01  -  25 Oct 2022 09:12  --------------------------------------------------------  IN: 240 mL / OUT: 140 mL / NET: 100 mL        MEDICATIONS  (STANDING):  chlorhexidine 2% Cloths 1 Application(s) Topical daily  influenza   Vaccine 0.5 milliLiter(s) IntraMuscular once  levothyroxine 175 MICROGram(s) Oral daily  polyethylene glycol 3350 17 Gram(s) Oral daily  senna 1 Tablet(s) Oral daily      IMAGING:   Recent imaging studies were reviewed.    LAB RESULTS:                          12.2   6.75  )-----------( 239      ( 25 Oct 2022 02:40 )             38.0       PT/INR - ( 24 Oct 2022 14:02 )   PT: 12.6 sec;   INR: 1.09 ratio         PTT - ( 24 Oct 2022 14:02 )  PTT:27.9 sec    10-25    136  |  101  |  10.2  ----------------------------<  90  3.9   |  22.0  |  0.44<L>    Ca    9.0      25 Oct 2022 02:40  Phos  3.4     10-25  Mg     2.1     10-25    TPro  7.2  /  Alb  4.4  /  TBili  0.2<L>  /  DBili  x   /  AST  20  /  ALT  27  /  AlkPhos  81  10-24                -----------------------------------------------------------------------------------------------------------------------------------------------------------------------------------    PHYSICAL EXAM:  General: Calm, laying in bed  HEENT: MMM  Neuro:  -Mental status- No acute distress, AOx3, conversational, following commands  -CN- PERRL 3mm, EOMI, tongue midline, face symmetric  -Motor- full strength in all ext  -Sensation- intact to LT   -Coordination- no dysmetria noted    CV:   Pulm: Clear to auscultation  Abd: Soft, nontender, nondistended  Ext: No edema  Skin: warm, dry

## 2022-10-25 NOTE — PHYSICAL THERAPY INITIAL EVALUATION ADULT - SENSORY TESTS
+ eye tracking bilaterally in all fields, + acuity in central and peripheral fields, + finger to thumb coordination bilaterally, no change in vision reported.

## 2022-10-25 NOTE — CONSULT NOTE ADULT - SUBJECTIVE AND OBJECTIVE BOX
27yF was admitted on 10-24    Imaging performed:  HEAD CT: No acute hemorrhage or midline shift.    CT PERFUSION demonstrated: No core infarct. No active ischemia. If symptoms persist consider follow up head CT or MRI, MRA  if no contraindication.    CTA COW:  Patent intracranial circulation without flow limiting stenosis.    CTA NECK: Patent, ECAs, ICAs, no  hemodynamically significant stenosis at  ICA origins by NASCET criteria. Bilateral vertebral arteries are patent without flow limiting stenosis.    Patient reports she feels tired and that contributes to her weakness.  Feels she is back to her baseline, aside from fatigue.  Reports she got out of the bed on her own.     REVIEW OF SYSTEMS  Constitutional - No fever, No weight loss, +fatigue  HEENT - No eye pain, No visual disturbances, No difficulty hearing, No tinnitus, No vertigo, No neck pain  Respiratory - No cough, No wheezing, No shortness of breath  Cardiovascular - No chest pain, No palpitations  Gastrointestinal - No abdominal pain, No nausea, No vomiting, No diarrhea, No constipation  Genitourinary - No dysuria, No frequency, No hematuria, No incontinence  Neurological - No headaches, No memory loss, No loss of strength, No numbness, No tremors  Skin - No itching, No rashes, No lesions   Endocrine - No temperature intolerance  Musculoskeletal - No joint pain, No joint swelling, No muscle pain  Psychiatric - No depression, No anxiety    VITALS  T(C): 37 (10-25-22 @ 08:02), Max: 37 (10-25-22 @ 08:02)  HR: 86 (10-25-22 @ 08:55) (55 - 89)  BP: 108/65 (10-25-22 @ 08:55) (92/69 - 153/78)  RR: 21 (10-25-22 @ 08:55) (11 - 21)  SpO2: 98% (10-25-22 @ 08:55) (93% - 100%)  Wt(kg): --    PAST MEDICAL & SURGICAL HISTORY  Abdominal Pain    Hypothyroidism    Hypothyroid    PE (pulmonary embolism)    Left knee pain, unspecified chronicity    Hypothyroid    History of pulmonary embolism    History of Appendectomy    S/P cholecystectomy    H/O dilation and curettage    S/P appendectomy    S/P cholecystectomy    H/O ovarian cystectomy    S/P appendectomy    S/P cholecystectomy    S/P ovarian cystectomy        FUNCTIONAL HISTORY  Lives with 3 children and spouse, 12 LEESA to basement  Independent    CURRENT FUNCTIONAL STATUS  Pending formal eval     SOCIAL HISTORY - as per documentation/history  Smoking - None  EtOH - None  Drugs - None    FAMILY HISTORY   No pertinent family history in first degree relatives        RECENT LABS - Reviewed  CBC Full  -  ( 25 Oct 2022 02:40 )  WBC Count : 6.75 K/uL  RBC Count : 4.48 M/uL  Hemoglobin : 12.2 g/dL  Hematocrit : 38.0 %  Platelet Count - Automated : 239 K/uL  Mean Cell Volume : 84.8 fl  Mean Cell Hemoglobin : 27.2 pg  Mean Cell Hemoglobin Concentration : 32.1 gm/dL  Auto Neutrophil # : x  Auto Lymphocyte # : x  Auto Monocyte # : x  Auto Eosinophil # : x  Auto Basophil # : x  Auto Neutrophil % : x  Auto Lymphocyte % : x  Auto Monocyte % : x  Auto Eosinophil % : x  Auto Basophil % : x    10-25    136  |  101  |  10.2  ----------------------------<  90  3.9   |  22.0  |  0.44<L>    Ca    9.0      25 Oct 2022 02:40  Phos  3.4     10-25  Mg     2.1     10-25    TPro  7.2  /  Alb  4.4  /  TBili  0.2<L>  /  DBili  x   /  AST  20  /  ALT  27  /  AlkPhos  81  10-24        ALLERGIES  No Known Allergies      MEDICATIONS   acetaminophen   IVPB .. 1000 milliGRAM(s) IV Intermittent once PRN  atorvastatin 80 milliGRAM(s) Oral at bedtime  chlorhexidine 2% Cloths 1 Application(s) Topical daily  hydrALAZINE Injectable 10 milliGRAM(s) IV Push every 2 hours PRN  influenza   Vaccine 0.5 milliLiter(s) IntraMuscular once  labetalol Injectable 10 milliGRAM(s) IV Push every 2 hours PRN  levothyroxine 175 MICROGram(s) Oral daily  polyethylene glycol 3350 17 Gram(s) Oral daily  senna 1 Tablet(s) Oral daily      ----------------------------------------------------------------------------------------  PHYSICAL EXAM  Constitutional - NAD, Comfortable  HEENT - NCAT, EOMI  Neck - Supple, No limited ROM  Chest - Breathing comfortably, No wheezing  Cardiovascular - S1S2   Abdomen - Soft   Extremities - No C/C/E, No calf tenderness   Neurologic Exam -                    Cognitive - AAO to self, place, date, year, situation     Communication - Fluent, No dysarthria     Cranial Nerves - CN 2-12 intact     Motor - No focal deficits                    LEFT    UE - ShAB 5/5, EF 5/5, EE 5/5, WE 5/5,  5/5                    RIGHT UE - ShAB 5/5, EF 5/5, EE 5/5, WE 5/5,  5/5                    LEFT    LE - HF 5/5, KE 5/5, DF 5/5, PF 5/5                    RIGHT LE - HF 5/5, KE 5/5, DF 5/5, PF 5/5        Sensory - Intact to LT     Coordination - FTN intact  Psychiatric - Mood stable, Affect WNL  ----------------------------------------------------------------------------------------  ASSESSMENT/PLAN  27yFemale with functional deficits after developing left sided weakness  Left sided weakness s/p tPA - Lipitor, MRI pending   HTN - Hydralazine, Labetalol  Pain - Tylenol  DVT PPX - SCDs  Rehab - Additional workup and therapy evals pending. Based on exam, expect patient to achieve DC HOME without services.      Will continue to follow. Rehab recommendations are dependent on how functional progress changes as well as how patient continues to participate and tolerate therapeutic interventions, which may change. Recommend ongoing mobilization by staff to maintain cardiopulmonary function and prevention of secondary complications related to debility. Discussed with rehab team.

## 2022-10-25 NOTE — SPEECH LANGUAGE PATHOLOGY EVALUATION - SLP GENERAL OBSERVATIONS
Pt received & seen seated upright OOB in chair, awake/alert, mother present, agreeable to eval, 0/10 pain pre/post

## 2022-10-25 NOTE — DISCHARGE NOTE NURSING/CASE MANAGEMENT/SOCIAL WORK - PATIENT PORTAL LINK FT
You can access the FollowMyHealth Patient Portal offered by [facility] by registering at the following website: [email or web address]. By joining [facility]’s FollowMyHealth portal, you will also be able to view your health information using other applications (apps) compatible with our system.

## 2022-10-25 NOTE — SPEECH LANGUAGE PATHOLOGY EVALUATION - SLP DIAGNOSIS
Receptive & expressive language skills WNL, speech intelligibility WFL. Formal assessment of cognition to be completed

## 2022-10-25 NOTE — PHYSICAL THERAPY INITIAL EVALUATION ADULT - GENERAL OBSERVATIONS, REHAB EVAL
Pt received supine in bed + telemetry//BP. c/o 2/10 Headache, tylenol received per RN, pt agreeable to PT.

## 2022-10-25 NOTE — CONSULT NOTE ADULT - PROBLEM SELECTOR RECOMMENDATION 9
TTE with bubble study pending  MYRON tomorrow  NPO tonight  Consider loop or outpatient MCOT if MYRON is negative

## 2022-10-25 NOTE — PROGRESS NOTE ADULT - CRITICAL CARE ATTENDING COMMENT
I spent 40 minutes of critical care time examining patient, reviewing vitals, labs, medications, imaging and discussing with the team goals of care to prevent life-threatening in this patient who is at high risk for deterioration or death due to:  stroke

## 2022-10-25 NOTE — PROGRESS NOTE ADULT - SUBJECTIVE AND OBJECTIVE BOX
Preliminary note, official recommendations pending attending signature/review     HPI:  27-year-old female with history of hypothyroid and PE in pregnancy (at 5 months gestation in 2016), treated with Lovenox for duration of pregnancy, had negative hypercoaguable workup, AC discontinued after delivery. Presented to ED today stating she had dizziness with nausea this morning at 11am. She reports that these symptoms were followed by left sided weakness and numbness. Dizziness/nausea have resolved.  On arrival to CTH/CTA/CTP negative. NIHSS of 4, TPA administered at 13:55.  NIH: 4 mRs: 0    SUBJECTIVE: No events overnight.  No new neurologic complaints.  Fatigue continues. Otherwise feels back to normal. Denies headache, nausea, vomiting, dizziness, weakness, vision changes. ROS reported negative unless otherwise noted.    acetaminophen   IVPB .. 1000 milliGRAM(s) IV Intermittent once PRN  chlorhexidine 2% Cloths 1 Application(s) Topical daily  hydrALAZINE Injectable 10 milliGRAM(s) IV Push every 2 hours PRN  influenza   Vaccine 0.5 milliLiter(s) IntraMuscular once  labetalol Injectable 10 milliGRAM(s) IV Push every 2 hours PRN  levothyroxine 175 MICROGram(s) Oral daily  polyethylene glycol 3350 17 Gram(s) Oral daily  senna 1 Tablet(s) Oral daily      PHYSICAL EXAM:   Vital Signs Last 24 Hrs  T(C): 37 (25 Oct 2022 08:02), Max: 37 (25 Oct 2022 08:02)  T(F): 98.6 (25 Oct 2022 08:02), Max: 98.6 (25 Oct 2022 08:02)  HR: 86 (25 Oct 2022 08:55) (55 - 89)  BP: 108/65 (25 Oct 2022 08:55) (92/69 - 153/78)  BP(mean): 80 (25 Oct 2022 08:55) (67 - 97)  RR: 21 (25 Oct 2022 08:55) (11 - 21)  SpO2: 98% (25 Oct 2022 08:55) (93% - 100%)    Parameters below as of 25 Oct 2022 07:55  Patient On (Oxygen Delivery Method): room air        General: No acute distress      NEUROLOGICAL EXAM:  Mental status: Awake, alert, oriented x3, speech fluent, follows single and cross midline commands, no neglect, normal memory   Cranial Nerves: No facial asymmetry, no nystagmus, no dysarthria,  tongue midline  Motor exam: Normal tone, no drift, 5/5 RUE, 5/5 RLE, 5/5 LUE, 5/5 LLE, normal fine finger movements.  Sensation: Intact to light touch   Coordination/ Gait: No dysmetria, gait not tested    LABS:                        12.2   6.75  )-----------( 239      ( 25 Oct 2022 02:40 )             38.0    10-25    136  |  101  |  10.2  ----------------------------<  90  3.9   |  22.0  |  0.44<L>    Ca    9.0      25 Oct 2022 02:40  Phos  3.4     10-25  Mg     2.1     10-25    TPro  7.2  /  Alb  4.4  /  TBili  0.2<L>  /  DBili  x   /  AST  20  /  ALT  27  /  AlkPhos  81  10-24  PT/INR - ( 24 Oct 2022 14:02 )   PT: 12.6 sec;   INR: 1.09 ratio         PTT - ( 24 Oct 2022 14:02 )  PTT:27.9 sec      IMAGING: Reviewed by me.   (10.24.22 @ 13:53)   CT PERFUSION demonstrated: No core infarct. No active ischemia.  If symptomspersist consider follow up head CT or MRI, MRA  if no   contraindication.  CTA COW:  Patent intracranial circulation without flow limiting stenosis.  CTA NECK: Patent, ECAs, ICAs, no  hemodynamically significant stenosis at    ICA origins by NASCET criteria.  Bilateral vertebral arteries are patent without flow limiting stenosis.    (10.24.22 @ 13:38)   HEAD CT: No acute hemorrhage or midline shift.              HPI:  27-year-old female with history of hypothyroid and PE in pregnancy (at 5 months gestation in 2016), treated with Lovenox for duration of pregnancy, had negative hypercoaguable workup, AC discontinued after delivery. Presented to ED today stating she had dizziness with nausea this morning at 11am. She reports that these symptoms were followed by left sided weakness and numbness. Dizziness/nausea have resolved.  On arrival to CTH/CTA/CTP negative. NIHSS of 4, TPA administered at 13:55.  NIH: 4 mRs: 0    SUBJECTIVE: No events overnight.  No new neurologic complaints.  Fatigue continues. Otherwise feels back to normal. Denies headache, nausea, vomiting, dizziness, weakness, vision changes. ROS reported negative unless otherwise noted.    acetaminophen   IVPB .. 1000 milliGRAM(s) IV Intermittent once PRN  chlorhexidine 2% Cloths 1 Application(s) Topical daily  hydrALAZINE Injectable 10 milliGRAM(s) IV Push every 2 hours PRN  influenza   Vaccine 0.5 milliLiter(s) IntraMuscular once  labetalol Injectable 10 milliGRAM(s) IV Push every 2 hours PRN  levothyroxine 175 MICROGram(s) Oral daily  polyethylene glycol 3350 17 Gram(s) Oral daily  senna 1 Tablet(s) Oral daily      PHYSICAL EXAM:   Vital Signs Last 24 Hrs  T(C): 37 (25 Oct 2022 08:02), Max: 37 (25 Oct 2022 08:02)  T(F): 98.6 (25 Oct 2022 08:02), Max: 98.6 (25 Oct 2022 08:02)  HR: 86 (25 Oct 2022 08:55) (55 - 89)  BP: 108/65 (25 Oct 2022 08:55) (92/69 - 153/78)  BP(mean): 80 (25 Oct 2022 08:55) (67 - 97)  RR: 21 (25 Oct 2022 08:55) (11 - 21)  SpO2: 98% (25 Oct 2022 08:55) (93% - 100%)    Parameters below as of 25 Oct 2022 07:55  Patient On (Oxygen Delivery Method): room air        General: No acute distress      NEUROLOGICAL EXAM:  Mental status: Awake, alert, oriented x3, speech fluent, follows single and cross midline commands, no neglect, normal memory   Cranial Nerves: No facial asymmetry, no nystagmus, no dysarthria,  tongue midline  Motor exam: Normal tone, no drift, 5/5 RUE, 5/5 RLE, 5/5 LUE, 5/5 LLE, normal fine finger movements.  Sensation: Intact to light touch   Coordination/ Gait: No dysmetria, gait not tested    LABS:                        12.2   6.75  )-----------( 239      ( 25 Oct 2022 02:40 )             38.0    10-25    136  |  101  |  10.2  ----------------------------<  90  3.9   |  22.0  |  0.44<L>    Ca    9.0      25 Oct 2022 02:40  Phos  3.4     10-25  Mg     2.1     10-25    TPro  7.2  /  Alb  4.4  /  TBili  0.2<L>  /  DBili  x   /  AST  20  /  ALT  27  /  AlkPhos  81  10-24  PT/INR - ( 24 Oct 2022 14:02 )   PT: 12.6 sec;   INR: 1.09 ratio         PTT - ( 24 Oct 2022 14:02 )  PTT:27.9 sec      IMAGING: Reviewed by me.   (10.24.22 @ 13:53)   CT PERFUSION demonstrated: No core infarct. No active ischemia.  If symptomspersist consider follow up head CT or MRI, MRA  if no   contraindication.  CTA COW:  Patent intracranial circulation without flow limiting stenosis.  CTA NECK: Patent, ECAs, ICAs, no  hemodynamically significant stenosis at    ICA origins by NASCET criteria.  Bilateral vertebral arteries are patent without flow limiting stenosis.    (10.24.22 @ 13:38)   HEAD CT: No acute hemorrhage or midline shift.

## 2022-10-25 NOTE — PROGRESS NOTE ADULT - SUBJECTIVE AND OBJECTIVE BOX
Neuro ICU Transfer acceptance note    INTERVAL HPI/OVERNIGHT EVENTS: Patient seen and examined, no acute complaints. Left sided symptoms resolved      Vital Signs Last 24 Hrs  T(C): 37 (25 Oct 2022 16:01), Max: 37.1 (25 Oct 2022 11:55)  T(F): 98.6 (25 Oct 2022 16:01), Max: 98.7 (25 Oct 2022 11:55)  HR: 74 (25 Oct 2022 18:05) (55 - 94)  BP: 128/77 (25 Oct 2022 18:05) (101/55 - 129/76)  BP(mean): 85 (25 Oct 2022 18:05) (67 - 100)  RR: 19 (25 Oct 2022 18:05) (11 - 25)  SpO2: 98% (25 Oct 2022 18:05) (96% - 100%)    Parameters below as of 25 Oct 2022 18:05  Patient On (Oxygen Delivery Method): room air        PHYSICAL EXAM:    GENERAL: NAD, AOX3  HEAD:  Atraumatic, Normocephalic  EYES: EOMI, PERRLA, conjunctiva and sclera clear  ENMT: Moist mucous membranes  NECK: Supple, No JVD  NERVOUS SYSTEM:  Alert & Oriented X3, Motor Strength 5/5 B/L upper and lower extremities; DTRs 2+ intact and symmetric  CHEST/LUNG: Clear to auscultation bilaterally; No rales, rhonchi, wheezing, or rubs  HEART: Regular rate and rhythm; No murmurs, rubs, or gallops  ABDOMEN: Soft, Nontender, Nondistended; Bowel sounds present  EXTREMITIES:  2+ Peripheral Pulses, No clubbing, cyanosis, or edema        MEDICATIONS  (STANDING):  aspirin  chewable 81 milliGRAM(s) Oral daily  atorvastatin 80 milliGRAM(s) Oral at bedtime  chlorhexidine 2% Cloths 1 Application(s) Topical daily  enoxaparin Injectable 40 milliGRAM(s) SubCutaneous every 24 hours  influenza   Vaccine 0.5 milliLiter(s) IntraMuscular once  polyethylene glycol 3350 17 Gram(s) Oral daily  senna 1 Tablet(s) Oral daily    MEDICATIONS  (PRN):  acetaminophen   IVPB .. 1000 milliGRAM(s) IV Intermittent once PRN Mild Pain (1 - 3), Moderate Pain (4 - 6), Severe Pain (7 - 10)  hydrALAZINE Injectable 10 milliGRAM(s) IV Push every 2 hours PRN SBP > 180  labetalol Injectable 10 milliGRAM(s) IV Push every 2 hours PRN Systolic blood pressure > 180      Allergies    No Known Allergies    Intolerances          LABS:                          12.2   6.75  )-----------( 239      ( 25 Oct 2022 02:40 )             38.0     10-25    136  |  101  |  10.2  ----------------------------<  90  3.9   |  22.0  |  0.44<L>    Ca    9.0      25 Oct 2022 02:40  Phos  3.4     10-25  Mg     2.1     10-25    TPro  7.2  /  Alb  4.4  /  TBili  0.2<L>  /  DBili  x   /  AST  20  /  ALT  27  /  AlkPhos  81  10-24    PT/INR - ( 24 Oct 2022 14:02 )   PT: 12.6 sec;   INR: 1.09 ratio         PTT - ( 24 Oct 2022 14:02 )  PTT:27.9 sec      RADIOLOGY & ADDITIONAL TESTS:

## 2022-10-25 NOTE — CONSULT NOTE ADULT - SUBJECTIVE AND OBJECTIVE BOX
Patient is a 27y old  Female who presents with a chief complaint of CVA s/p TPA    HPI:  27-year-old female with history of hypothyroid and PE in pregnancy (at 5 months gestation in 2016), treated with Lovenox for duration of pregnancy, had negative hypercoaguable workup, AC discontinued after delivery. Presented to ED today stating she had dizziness with nausea this morning at 11am. She reports that these symptoms were followed by left sided weakness and numbness. Dizziness/nausea have resolved.  On arrival to CTH/CTA/CTP negative. NIHSS of 4, TPA administered at 13:55.   Admitted ot neurology for monitoring.  Has h/o hypothyroidism, on replacement, currently on levothyroxine 175 mcg daily.  Her labs showed TSH of 5 and repeat next day 12.                PAST MEDICAL & SURGICAL HISTORY:  Hypothyroidism    History of pulmonary embolism  23 weeks gestation on VQ scan Had lovenox till delivery    H/O dilation and curettage    S/P appendectomy  2001    S/P cholecystectomy  2013    S/P ovarian cystectomy  2014        Social History:  Lives at home with  and three small children (24 Oct 2022 15:37)      FAMILY HISTORY:        Allergies    No Known Allergies    Intolerances        REVIEW OF SYSTEMS:    CONSTITUTIONAL: No fever, weight loss, or fatigue  EYES: No eye pain, visual disturbances, or discharge  ENMT:  No difficulty hearing, tinnitus, vertigo; No sinus or throat pain  NECK: No pain or stiffness  RESPIRATORY: No cough, wheezing, chills or hemoptysis; No shortness of breath  CARDIOVASCULAR: No chest pain, palpitations, dizziness, or leg swelling  GASTROINTESTINAL: No abdominal or epigastric pain. No nausea, vomiting, or hematemesis;   No diarrhea or constipation. No melena or hematochezia.  NEUROLOGICAL: No headaches, memory loss, loss of strength, numbness, or tremors  SKIN: No itching, burning, rashes, or lesions   MUSCULOSKELETAL: No joint pain or swelling; No muscle, back, or extremity pain  PSYCHIATRIC: No depression, anxiety, mood swings, or difficulty sleeping        MEDICATIONS  (STANDING):  atorvastatin 80 milliGRAM(s) Oral at bedtime  chlorhexidine 2% Cloths 1 Application(s) Topical daily  influenza   Vaccine 0.5 milliLiter(s) IntraMuscular once  levothyroxine 175 MICROGram(s) Oral daily  polyethylene glycol 3350 17 Gram(s) Oral daily  senna 1 Tablet(s) Oral daily    MEDICATIONS  (PRN):  acetaminophen   IVPB .. 1000 milliGRAM(s) IV Intermittent once PRN Mild Pain (1 - 3), Moderate Pain (4 - 6), Severe Pain (7 - 10)  hydrALAZINE Injectable 10 milliGRAM(s) IV Push every 2 hours PRN SBP > 180  labetalol Injectable 10 milliGRAM(s) IV Push every 2 hours PRN Systolic blood pressure > 180      Vital Signs Last 24 Hrs  T(C): 37 (25 Oct 2022 08:02), Max: 37 (25 Oct 2022 08:02)  T(F): 98.6 (25 Oct 2022 08:02), Max: 98.6 (25 Oct 2022 08:02)  HR: 74 (25 Oct 2022 10:55) (55 - 89)  BP: 114/68 (25 Oct 2022 10:55) (92/69 - 153/78)  BP(mean): 82 (25 Oct 2022 10:55) (67 - 97)  RR: 19 (25 Oct 2022 10:55) (11 - 21)  SpO2: 98% (25 Oct 2022 10:55) (93% - 100%)    Parameters below as of 25 Oct 2022 07:55  Patient On (Oxygen Delivery Method): room air          PHYSICAL EXAM:    Constitutional: NAD, well-groomed, well-developed  HEENT: PERRL, no exophalmos  Neck: No LAD, , no thyroid enlargement  Respiratory: CTAB  Cardiovascular: S1 and S2, RRR, no M/G/R  Gastrointestinal: BS+, soft, no organomegaly or mass  Extremities: No peripheral edema, no pedal lesions  Neurological: A/O x 3, no focal deficits  Psychiatric: Normal mood, normal affect  Skin: No rashes, no acanthosis        LABS  10-25    136  |  101  |  10.2  ----------------------------<  90  3.9   |  22.0  |  0.44<L>    Ca    9.0      25 Oct 2022 02:40  Phos  3.4     10-25  Mg     2.1     10-25    TPro  7.2  /  Alb  4.4  /  TBili  0.2<L>  /  DBili  x   /  AST  20  /  ALT  27  /  AlkPhos  81  10-24                          12.2   6.75  )-----------( 239      ( 25 Oct 2022 02:40 )             38.0       A1C with Estimated Average Glucose Result: 5.4 % (10-25-22 @ 02:40)          Aspartate Aminotransferase (AST/SGOT): 20 U/L (10-24-22 @ 14:02)  Alanine Aminotransferase (ALT/SGPT): 27 U/L (10-24-22 @ 14:02)  Alkaline Phosphatase, Serum: 81 U/L (10-24-22 @ 14:02)  Albumin, Serum: 4.4 g/dL (10-24-22 @ 14:02)    T4, Serum: 9.7 ug/dL (10-25-22 @ 02:40)  Thyroid Stimulating Hormone, Serum: 12.27 uIU/mL (10-25-22 @ 02:40)  Thyroid Stimulating Hormone, Serum: 5.04 uIU/mL (10-24-22 @ 14:02)    CAPILLARY BLOOD GLUCOSE      POCT Blood Glucose.: 97 mg/dL (24 Oct 2022 13:15)       Patient is a 27y old  Female who presents with a chief complaint of CVA s/p TPA    HPI:  27-year-old female with history of hypothyroid and PE in pregnancy (at 5 months gestation in 2016), treated with Lovenox for duration of pregnancy, had negative hypercoaguable workup, AC discontinued after delivery. Presented to ED today stating she had dizziness with nausea this morning at 11am. She reports that these symptoms were followed by left sided weakness and numbness. Dizziness/nausea have resolved.  On arrival to CTH/CTA/CTP negative. NIHSS of 4, TPA administered at 13:55.   Admitted ot neurology for monitoring.  Has h/o hypothyroidism, on replacement, currently on levothyroxine 175 mcg daily.  Her labs showed TSH of 5 and repeat next day 12.  She says she has been hypothyroid since age 13.  But recently after child birth in 3/22 she ran out of her thyroid medication and did not take it for many months, restarted recently few weeks ago by her PCP because her TSH was high around 45.                  PAST MEDICAL & SURGICAL HISTORY:  Hypothyroidism    History of pulmonary embolism  23 weeks gestation on VQ scan Had lovenox till delivery    H/O dilation and curettage    S/P appendectomy  2001    S/P cholecystectomy  2013    S/P ovarian cystectomy  2014        Social History:  Lives at home with  and three small children (24 Oct 2022 15:37)      FAMILY HISTORY:        Allergies    No Known Allergies    Intolerances        REVIEW OF SYSTEMS:    CONSTITUTIONAL: No fever, weight loss, complains of  fatigue  EYES: No eye pain, visual disturbances, or discharge  ENMT:  No difficulty hearing, tinnitus, vertigo; No sinus or throat pain  NECK: No pain or stiffness  RESPIRATORY: No cough, wheezing, chills or hemoptysis; No shortness of breath  CARDIOVASCULAR: No chest pain, palpitations, dizziness, or leg swelling  GASTROINTESTINAL: No abdominal or epigastric pain. No nausea, vomiting, or hematemesis;   No diarrhea or constipation. No melena or hematochezia.  NEUROLOGICAL: No headaches, memory loss, loss of strength, numbness, or tremors  SKIN: No itching, burning, rashes, or lesions   MUSCULOSKELETAL: No joint pain or swelling; No muscle, back, or extremity pain  PSYCHIATRIC: No depression, anxiety, mood swings, or difficulty sleeping        MEDICATIONS  (STANDING):  atorvastatin 80 milliGRAM(s) Oral at bedtime  chlorhexidine 2% Cloths 1 Application(s) Topical daily  influenza   Vaccine 0.5 milliLiter(s) IntraMuscular once  levothyroxine 175 MICROGram(s) Oral daily  polyethylene glycol 3350 17 Gram(s) Oral daily  senna 1 Tablet(s) Oral daily    MEDICATIONS  (PRN):  acetaminophen   IVPB .. 1000 milliGRAM(s) IV Intermittent once PRN Mild Pain (1 - 3), Moderate Pain (4 - 6), Severe Pain (7 - 10)  hydrALAZINE Injectable 10 milliGRAM(s) IV Push every 2 hours PRN SBP > 180  labetalol Injectable 10 milliGRAM(s) IV Push every 2 hours PRN Systolic blood pressure > 180      Vital Signs Last 24 Hrs  T(C): 37 (25 Oct 2022 08:02), Max: 37 (25 Oct 2022 08:02)  T(F): 98.6 (25 Oct 2022 08:02), Max: 98.6 (25 Oct 2022 08:02)  HR: 74 (25 Oct 2022 10:55) (55 - 89)  BP: 114/68 (25 Oct 2022 10:55) (92/69 - 153/78)  BP(mean): 82 (25 Oct 2022 10:55) (67 - 97)  RR: 19 (25 Oct 2022 10:55) (11 - 21)  SpO2: 98% (25 Oct 2022 10:55) (93% - 100%)    Parameters below as of 25 Oct 2022 07:55  Patient On (Oxygen Delivery Method): room air          PHYSICAL EXAM:    Constitutional: NAD, well-groomed, well-developed  HEENT: PERRL, no exophalmos  Neck: No LAD, , no thyroid enlargement  Respiratory: CTAB  Cardiovascular: S1 and S2, RRR, no M/G/R  Gastrointestinal: BS+, soft, no organomegaly or mass  Extremities: No peripheral edema, no pedal lesions  Neurological: A/O x 3, no focal deficits  Psychiatric: Normal mood, normal affect  Skin: No rashes, no acanthosis        LABS  10-25    136  |  101  |  10.2  ----------------------------<  90  3.9   |  22.0  |  0.44<L>    Ca    9.0      25 Oct 2022 02:40  Phos  3.4     10-25  Mg     2.1     10-25    TPro  7.2  /  Alb  4.4  /  TBili  0.2<L>  /  DBili  x   /  AST  20  /  ALT  27  /  AlkPhos  81  10-24                          12.2   6.75  )-----------( 239      ( 25 Oct 2022 02:40 )             38.0       A1C with Estimated Average Glucose Result: 5.4 % (10-25-22 @ 02:40)          Aspartate Aminotransferase (AST/SGOT): 20 U/L (10-24-22 @ 14:02)  Alanine Aminotransferase (ALT/SGPT): 27 U/L (10-24-22 @ 14:02)  Alkaline Phosphatase, Serum: 81 U/L (10-24-22 @ 14:02)  Albumin, Serum: 4.4 g/dL (10-24-22 @ 14:02)    T4, Serum: 9.7 ug/dL (10-25-22 @ 02:40)  Thyroid Stimulating Hormone, Serum: 12.27 uIU/mL (10-25-22 @ 02:40)  Thyroid Stimulating Hormone, Serum: 5.04 uIU/mL (10-24-22 @ 14:02)    CAPILLARY BLOOD GLUCOSE      POCT Blood Glucose.: 97 mg/dL (24 Oct 2022 13:15)

## 2022-10-25 NOTE — PHYSICAL THERAPY INITIAL EVALUATION ADULT - ADDITIONAL COMMENTS
Pt lives in a private home with her spouse and 3 young children. Pt's spouse works days, and is unavailable to assist. Pt's cousin lives upstairs and works from home, able to assist during the day. 3 steps to enter with handrails, 12 steps inside with handrails to bedrooms. Pt was independent PTA without an assist device. Pt owns no DME.

## 2022-10-25 NOTE — OCCUPATIONAL THERAPY INITIAL EVALUATION ADULT - PERTINENT HX OF CURRENT PROBLEM, REHAB EVAL
As per MD note: 27-year-old female with history of hypothyroid and PE in pregnancy (at 5 months gestation in 2016), treated with Lovenox for duration of pregnancy, had negative hypercoaguable workup, AC discontinued after delivery. Presented to ED today stating she had dizziness with nausea this morning at 11am. She reports that these symptoms were followed by left sided weakness and numbness. Dizziness/nausea have resolved.  On arrival to CTH/CTA/CTP negative. NIHSS of 4, TPA administered at 13:55

## 2022-10-25 NOTE — PHYSICAL THERAPY INITIAL EVALUATION ADULT - TRANSFER SAFETY CONCERNS NOTED: SIT/STAND, REHAB EVAL
Pt required increased assist to Sit in chair 2*2 c/o dizziness, "pinching" chest pain and feeling "warm" BP assessed in chair at 115/81, Pt transported back to room in chair, RN aware and present during events. Pt report resolution of symptoms after seated rest break.

## 2022-10-25 NOTE — CHART NOTE - NSCHARTNOTEFT_GEN_A_CORE
HPI:  27-year-old female with history of hypothyroid and PE in pregnancy (at 5 months gestation in 2016), treated with Lovenox for duration of pregnancy, had negative hypercoaguable workup, AC discontinued after delivery. Presented to ED today stating she had dizziness with nausea this morning at 11am. She reports that these symptoms were followed by left sided weakness and numbness. Dizziness/nausea have resolved.  On arrival to CTH/CTA/CTP negative. NIHSS of 4, TPA administered at 13:55.     NIH: 4  mRs: 0    NIHSS = 4  1A: Level of consciousness       0= Alert; keenly responsive        1B: Ask month and age       0= Both questions right        1C: "Blink eyes" and "Squeeze Hands"       0= Performs both         2: Horizontal EOMs       0= Normal         3: Visual fields       0= No visual loss        4: Facial palsy (use grimace if obtunded)      +1= Minor paralysis (flat NLF, smile asymmetry)        5A: Left arm motor drift (count out loud and use fingers to show count)       +1= Drift but doesn't hit bed      5B: Right arm motor drift         0= No drift x 10 seconds          6A: Left leg motor drift        +1= Drift but doesn't hit bed          6B: Right leg motor drift       0= No drift x 10 seconds         7: Limb ataxia (FNF/heel-shin)       0= No ataxia             8: Sensation       +1= mild-moderate loss: can sense being touched         9: Language/aphasia- describe the scene (on florian); name the items; read the sentences (on florian)       0= Normal, no aphasia         10: Dysarthria- read the words       0= Normal         11: Extinction/inattention       0= No abnormality         Total NIHSS: 4 (24 Oct 2022 15:37)        INTERVAL HPI/OVERNIGHT EVENTS:  10/24: Received TPA @ 13:44.   10/25: DG to medicine.     Vital Signs Last 24 Hrs  T(C): 37 (25 Oct 2022 08:02), Max: 37 (25 Oct 2022 08:02)  T(F): 98.6 (25 Oct 2022 08:02), Max: 98.6 (25 Oct 2022 08:02)  HR: 81 (25 Oct 2022 06:55) (55 - 89)  BP: 102/73 (25 Oct 2022 06:55) (92/69 - 153/78)  BP(mean): 83 (25 Oct 2022 06:55) (67 - 90)  RR: 11 (25 Oct 2022 06:55) (11 - 21)  SpO2: 99% (25 Oct 2022 06:55) (93% - 100%)    Parameters below as of 25 Oct 2022 03:55  Patient On (Oxygen Delivery Method): room air          PHYSICAL EXAM:  GENERAL: NAD, well-groomed, well-developed  HEAD:  Atraumatic, normocephalic    MENTAL STATUS: AAO x3; Awake; Opens eyes spontaneously Appropriately conversant without aphasia; following simple commands  CRANIAL NERVES: PERRL; EOMI; facial sensation grossly intact to light touch b/l;  palate rises symmetrically; gag reflex intact  MOTOR: strength 5/5 B/L upper and lower extremities; sensation grossly intact all extremities;   CHEST/LUNG: Clear to auscultation bilaterally  HEART: +S1/+S2; Regular rate and rhythm  ABDOMEN: Soft, nontender, nondistended;   EXTREMITIES:  2+ peripheral   SKIN: Warm, dry; no rashes or lesions    LABS:                        12.2   6.75  )-----------( 239      ( 25 Oct 2022 02:40 )             38.0     10-25    136  |  101  |  10.2  ----------------------------<  90  3.9   |  22.0  |  0.44<L>    Ca    9.0      25 Oct 2022 02:40  Phos  3.4     10-25  Mg     2.1     10-25    TPro  7.2  /  Alb  4.4  /  TBili  0.2<L>  /  DBili  x   /  AST  20  /  ALT  27  /  AlkPhos  81  10-24    PT/INR - ( 24 Oct 2022 14:02 )   PT: 12.6 sec;   INR: 1.09 ratio         PTT - ( 24 Oct 2022 14:02 )  PTT:27.9 sec      10-24 @ 07:01  -  10-25 @ 07:00  --------------------------------------------------------  IN: 100 mL / OUT: 700 mL / NET: -600 mL    10-25 @ 07:01  -  10-25 @ 08:57  --------------------------------------------------------  IN: 240 mL / OUT: 140 mL / NET: 100 mL        RADIOLOGY & ADDITIONAL TESTS: HPI:  27-year-old female with history of hypothyroid and PE in pregnancy (at 5 months gestation in 2016), treated with Lovenox for duration of pregnancy, had negative hypercoaguable workup, AC discontinued after delivery. Presented to ED today stating she had dizziness with nausea this morning at 11am. She reports that these symptoms were followed by left sided weakness and numbness. Dizziness/nausea have resolved.  On arrival to CTH/CTA/CTP negative. NIHSS of 4, TPA administered at 13:55.     NIH: 4  mRs: 0    NIHSS = 4  1A: Level of consciousness       0= Alert; keenly responsive        1B: Ask month and age       0= Both questions right        1C: "Blink eyes" and "Squeeze Hands"       0= Performs both         2: Horizontal EOMs       0= Normal         3: Visual fields       0= No visual loss        4: Facial palsy (use grimace if obtunded)      +1= Minor paralysis (flat NLF, smile asymmetry)        5A: Left arm motor drift (count out loud and use fingers to show count)       +1= Drift but doesn't hit bed      5B: Right arm motor drift         0= No drift x 10 seconds          6A: Left leg motor drift        +1= Drift but doesn't hit bed          6B: Right leg motor drift       0= No drift x 10 seconds         7: Limb ataxia (FNF/heel-shin)       0= No ataxia             8: Sensation       +1= mild-moderate loss: can sense being touched         9: Language/aphasia- describe the scene (on florian); name the items; read the sentences (on florian)       0= Normal, no aphasia         10: Dysarthria- read the words       0= Normal         11: Extinction/inattention       0= No abnormality         Total NIHSS: 4 (24 Oct 2022 15:37)        INTERVAL HPI/OVERNIGHT EVENTS:  10/24: Received TPA @ 13:44.   10/25: DG to medicine.     Vital Signs Last 24 Hrs  T(C): 37 (25 Oct 2022 08:02), Max: 37 (25 Oct 2022 08:02)  T(F): 98.6 (25 Oct 2022 08:02), Max: 98.6 (25 Oct 2022 08:02)  HR: 81 (25 Oct 2022 06:55) (55 - 89)  BP: 102/73 (25 Oct 2022 06:55) (92/69 - 153/78)  BP(mean): 83 (25 Oct 2022 06:55) (67 - 90)  RR: 11 (25 Oct 2022 06:55) (11 - 21)  SpO2: 99% (25 Oct 2022 06:55) (93% - 100%)    Parameters below as of 25 Oct 2022 03:55  Patient On (Oxygen Delivery Method): room air          PHYSICAL EXAM:  GENERAL: NAD, well-groomed, well-developed  HEAD:  Atraumatic, normocephalic    MENTAL STATUS: AAO x3; Awake; Opens eyes spontaneously Appropriately conversant without aphasia; following simple commands  CRANIAL NERVES: PERRL; EOMI; facial sensation grossly intact to light touch b/l;  palate rises symmetrically; gag reflex intact  MOTOR: strength 5/5 B/L upper and lower extremities; sensation grossly intact all extremities;   CHEST/LUNG: Clear to auscultation bilaterally  HEART: +S1/+S2; Regular rate and rhythm  ABDOMEN: Soft, nontender, nondistended;   EXTREMITIES:  2+ peripheral   SKIN: Warm, dry; no rashes or lesions    LABS:                        12.2   6.75  )-----------( 239      ( 25 Oct 2022 02:40 )             38.0     10-25    136  |  101  |  10.2  ----------------------------<  90  3.9   |  22.0  |  0.44<L>    Ca    9.0      25 Oct 2022 02:40  Phos  3.4     10-25  Mg     2.1     10-25    TPro  7.2  /  Alb  4.4  /  TBili  0.2<L>  /  DBili  x   /  AST  20  /  ALT  27  /  AlkPhos  81  10-24    PT/INR - ( 24 Oct 2022 14:02 )   PT: 12.6 sec;   INR: 1.09 ratio         PTT - ( 24 Oct 2022 14:02 )  PTT:27.9 sec      10-24 @ 07:01  -  10-25 @ 07:00  --------------------------------------------------------  IN: 100 mL / OUT: 700 mL / NET: -600 mL    10-25 @ 07:01  -  10-25 @ 08:57  --------------------------------------------------------  IN: 240 mL / OUT: 140 mL / NET: 100 mL        RADIOLOGY & ADDITIONAL TESTS:  < from: CT Angio Neck Stroke Protocol w/ IV Cont (10.24.22 @ 13:53) >        IMPRESSION:    CT PERFUSION demonstrated: No core infarct. No active ischemia.  If symptomspersist consider follow up head CT or MRI, MRA  if no   contraindication.    CTA COW:  Patent intracranial circulation without flow limiting stenosis.    CTA NECK: Patent, ECAs, ICAs, no  hemodynamically significant stenosis at    ICA origins by NASCET criteria.  Bilateral vertebral arteries are patent without flow limiting stenosis.     Discussed with Dr. Anton in the ED at 1:57 PM.    < end of copied text >    < from: US Duplex Venous Lower Ext Complete, Bilateral (10.24.22 @ 23:21) >    IMPRESSION:  No evidence of deep venous thrombosis in either lower extremity.        < end of copied text >      < from: CT Head No Cont (10.25.22 @ 14:17) >      IMPRESSION:  Unremarkable head CT.   No adverse interval change preceding   day. MR may provide higher sensitivity imaging evaluation for the   clinical indication of acute infarction.    --- End of Report ---    < end of copied text >    < from: CT Head No Cont (10.25.22 @ 14:17) >      IMPRESSION:  Unremarkable head CT.   No adverse interval change preceding   day. MR may provide higher sensitivity imaging evaluation for the   clinical indication of acute infarction.    < end of copied text >      Assessment:   27-year-old female with history of hypothyroid and PE in pregnancy (at 5 months gestation in 2016), treated with Lovenox for duration of pregnancy, had negative hypercoaguable workup, AC discontinued after delivery. Presented to ED 10/24/22 stating she had dizziness with nausea this morning since 11am. She reported that these symptoms were followed by left sided weakness and numbness. Dizziness/nausea have resolved.  On arrival to CTH/CTA/CTP negative. NIHSS of 4, TPA administered at 13:55 10/24/22.   Plan:    Neuro:	Q1 hour Neuro checks, Q1 hour Vitals  	HOB 30 degrees, Neck midline position  	Maintain normothermia, PO acetaminophen for temp>38 C or pain  	Post TPA CTH in 24 hours stable , MRI brain  	Pain management: Avoid sedation              Stroke Core Measures: ASA after stability CTH, Statin prior to discharge              follow up hypercoagulable labs              Neurology consulted, reccs appreciated              PT/OT/ P, M & R tomorrow               	  CV        SBP Goal 120- 180 post TPA, PRN Hydralazine and Labetalol              TTE with bubble study pending               MYRON Pending               cardiology consult for cryptogenic stroke    Pulm:     room air	  	Supplemental O2 PRN to maintain Spo2>92%  	Chest PT, OOB, Pulmonary Toilet    GI:	Nutrition: adv as tolerated  	Bowel regimen, LBM 10/24  	  Gu:	Voiding   	I&O Q1 hour  	Monitor Electrolytes & Renal Function    Heme:	Monitor H&H                 ASA 81               Lovenox  	Chemical DVT prophylaxis is contraindicated due to risk of bleeding s/p TPA  	Mechanical DVT Prophylaxis: Maintain B/L LE sequential compression devices  	Lower Extremity Doppler- negative  			  ID:	Monitor WBC and Temperature  	  Endo    Endo consulted. reccs appreciated               TSH elevated- check T4, home Synthroid 75 mcg continued              A1C 5.4   	Monitor BGL, maintain <180  	CINDY

## 2022-10-25 NOTE — PROGRESS NOTE ADULT - ASSESSMENT
The patient is a 27 year old female with a past medical history of  hypothyroid and PE in pregnancy (at 5 months gestation in 2016), treated with Lovenox for duration of pregnancy,  AC discontinued after delivery. Presented to ED stating she had dizziness with nausea  followed by left sided weakness and numbness and LOC.   On arrival to CTH/CTA/CTP negative. NIHSS of 4, TPA administered at 13:55.      Assessment/Plan:    1. Acute CVA  - Status post TPA  - MRI completed results pending  - TTE with bubble study  - NPO after midnight for MYRON in AM  - Hypercoagulable work up  - Aspirin and statin  - PT/OT/ST- home    2. Hypothyroidism  - Seen by [facility]  - Continue current dose of synthroid  -Repeat TFTS in 4 weeks

## 2022-10-25 NOTE — PHYSICAL THERAPY INITIAL EVALUATION ADULT - PERTINENT HX OF CURRENT PROBLEM, REHAB EVAL
27-year-old female with history of hypothyroid and PE in pregnancy (at 5 months gestation in 2016), treated with Lovenox for duration of pregnancy, had negative hypercoaguable workup, AC discontinued after delivery. Presented to ED today stating she had dizziness with nausea followed by left sided weakness and numbness. CTH/CTA/CTP negative. NIHSS of 4, TPA administered at 13:55

## 2022-10-25 NOTE — CONSULT NOTE ADULT - SUBJECTIVE AND OBJECTIVE BOX
[facility]                                              CARDIOLOGY AT [place] [address]                                             Telephone: [phone]. Fax:[phone]                                                       CARDIOLOGY CONSULTATION NOTE                                                                                             History obtained by: Patient and medical record  Community Cardiologist:   None   obtained: Yes [  ] No [ x ]  Reason for Consultation:   Stroke  Available out pt records reviewed: Yes [  ] No [  ]    Chief complaint:    Patient is a 27y old  Female who presents with a chief complaint of CVA s/p TPA (25 Oct 2022 11:17)    HPI:  27-year-old female with history of hypothyroid and PE in pregnancy (at 5 months gestation in 2016), treated with Lovenox for duration of pregnancy, had negative hypercoagulable workup outpatient   AC discontinued after delivery.     Presented to ED today for dizziness with nausea yesterday at 11am.  These symptoms were followed by left sided weakness and numbness.  Patient  TPA administered yesterday 13:55.  Patient had symptom relief after TPA.  A cardiac consult was called due to new stroke with unknown etiology,   Denies dizziness, headache syncope, presyncope chest pain, palpitations, cough sob, pnd, orthopnea n/v/d, constipation weakness or muscle cramps          CARDIAC TESTING  - NOne  ECHO:  None    STRESS:  None    CATH:   NOne    ELECTROPHYSIOLOGY:     PAST MEDICAL HISTORY  Abdominal Pain  Hypothyroidism  Hypothyroid  PE (pulmonary embolism)  Left knee pain, unspecified chronicity  Hypothyroid  History of pulmonary embolism  PAST SURGICAL HISTORY  History of Appendectomy  S/P cholecystectomy  H/O dilation and curettage  H/O ovarian cystectomy  S/P appendectomy        SOCIAL HISTORY:  Denies smoking/alcohol/drugs    FAMILY HISTORY:  Family History of Cardiovascular Disease:  Yes [  ] No [ x ]  Coronary Artery Disease in first degree relative: Yes [  ] No [ x ]  Sudden Cardiac Death in First degree relative: Yes [  ] No [ x ]    HOME MEDICATIONS:  Synthroid 75 mcg (0.075 mg) oral tablet: 1 tab(s) orally once a day (04 Dec 2017 22:16)      CURRENT CARDIAC MEDICATIONS:  hydrALAZINE Injectable 10 milliGRAM(s) IV Push every 2 hours PRN SBP > 180  labetalol Injectable 10 milliGRAM(s) IV Push every 2 hours PRN Systolic blood pressure > 180    CURRENT OTHER MEDICATIONS:  acetaminophen   IVPB .. 1000 milliGRAM(s) IV Intermittent once, Stop order after: 1 Doses PRN Mild Pain (1 - 3), Moderate Pain (4 - 6), Severe Pain (7 - 10)  polyethylene glycol 3350 17 Gram(s) Oral daily  senna 1 Tablet(s) Oral daily  atorvastatin 80 milliGRAM(s) Oral at bedtime  chlorhexidine 2% Cloths 1 Application(s) Topical daily  influenza   Vaccine 0.5 milliLiter(s) IntraMuscular once    ALLERGIES:   No Known Allergies    REVIEW OF SYMPTOMS:   CONSTITUTIONAL: No fever, no chills, no weight loss, no weight gain, no fatigue   ENMT:  No vertigo; No sinus or throat pain  NECK: No pain or stiffness  CARDIOVASCULAR: No chest pain, no dyspnea, no syncope/presyncope, no palpitations, no dizziness, no Orthopnea, no Paroxsymal nocturnal dyspnea  RESPIRATORY: no Shortness of breath, no cough, no wheezing  : No dysuria, no hematuria   GI: No dark color stool, no nausea, no diarrhea, no constipation, no abdominal pain   NEURO: No headache, no slurred speech   MUSCULOSKELETAL: No joint pain or swelling; No muscle, back, or extremity pain  PSYCH: No agitation, no anxiety.    ALL OTHER REVIEW OF SYSTEMS ARE NEGATIVE.    VITAL SIGNS:  T(C): 37 (10-25-22 @ 08:02), Max: 37 (10-25-22 @ 08:02)  T(F): 98.6 (10-25-22 @ 08:02), Max: 98.6 (10-25-22 @ 08:02)  HR: 77 (10-25-22 @ 11:55) (55 - 86)  BP: 111/81 (10-25-22 @ 11:55) (92/69 - 131/80)  RR: 16 (10-25-22 @ 11:55) (11 - 21)  SpO2: 98% (10-25-22 @ 11:55) (93% - 100%)    INTAKE AND OUTPUT:   10-24 @ 07:01  -  10-25 @ 07:00  --------------------------------------------------------  IN: 100 mL / OUT: 700 mL / NET: -600 mL    10-25 @ 07:01  -  10-25 @ 14:50  --------------------------------------------------------  IN: 340 mL / OUT: 140 mL / NET: 200 mL    PHYSICAL EXAM:  Constitutional: Comfortable . No acute distress.   HEENT: Atraumatic and normocephalic , neck is supple . no JVD. No carotid bruit.  CNS: A&Ox3. No focal deficits.   Respiratory: CTAB, unlabored   Cardiovascular: RRR normal s1 s2. No murmur. No rubs or gallop.  Gastrointestinal: Soft, non-tender. +Bowel sounds.   Extremities: 2+ Peripheral Pulses, No clubbing, cyanosis, or edema  Psychiatric: Calm . no agitation.   Skin: Warm and dry, no ulcers on extremities     LABS:  ( 24 Oct 2022 14:02 )  Troponin T  <0.01,  CPK  X    , CKMB  X    , BNP X                             12.2   6.75  )-----------( 239      ( 25 Oct 2022 02:40 )             38.0     10-25    136  |  101  |  10.2  ----------------------------<  90  3.9   |  22.0  |  0.44<L>    Ca    9.0      25 Oct 2022 02:40  Phos  3.4     10-25  Mg     2.1     10-25    TPro  7.2  /  Alb  4.4  /  TBili  0.2<L>  /  DBili  x   /  AST  20  /  ALT  27  /  AlkPhos  81  10-24    PT/INR - ( 24 Oct 2022 14:02 )   PT: 12.6 sec;   INR: 1.09 ratio         PTT - ( 24 Oct 2022 14:02 )  PTT:27.9 sec    10-25-22 @ 02:40  CHolesterol: 161,  HDL: 35,  LDL: 103, Triglycerides: 113       Thyroid Stimulating Hormone, Serum: 12.27 uIU/mL (10-25-22 @ 02:40)    INTERPRETATION OF TELEMETRY:   Sr, no acute change noted.      ECG:  Sr, no acute change noted  Prior ECG: Yes [  ] No [ x ]

## 2022-10-25 NOTE — PATIENT PROFILE ADULT - FALL HARM RISK - HARM RISK INTERVENTIONS

## 2022-10-26 ENCOUNTER — TRANSCRIPTION ENCOUNTER (OUTPATIENT)
Age: 27
End: 2022-10-26

## 2022-10-26 VITALS
RESPIRATION RATE: 18 BRPM | DIASTOLIC BLOOD PRESSURE: 68 MMHG | TEMPERATURE: 98 F | SYSTOLIC BLOOD PRESSURE: 106 MMHG | OXYGEN SATURATION: 100 % | HEART RATE: 72 BPM

## 2022-10-26 LAB
ANION GAP SERPL CALC-SCNC: 11 MMOL/L — SIGNIFICANT CHANGE UP (ref 5–17)
BUN SERPL-MCNC: 12 MG/DL — SIGNIFICANT CHANGE UP (ref 8–20)
CALCIUM SERPL-MCNC: 9.2 MG/DL — SIGNIFICANT CHANGE UP (ref 8.4–10.5)
CHLORIDE SERPL-SCNC: 102 MMOL/L — SIGNIFICANT CHANGE UP (ref 96–108)
CO2 SERPL-SCNC: 22 MMOL/L — SIGNIFICANT CHANGE UP (ref 22–29)
CREAT SERPL-MCNC: 0.4 MG/DL — LOW (ref 0.5–1.3)
EGFR: 138 ML/MIN/1.73M2 — SIGNIFICANT CHANGE UP
GLUCOSE SERPL-MCNC: 109 MG/DL — HIGH (ref 70–99)
HCT VFR BLD CALC: 36.7 % — SIGNIFICANT CHANGE UP (ref 34.5–45)
HGB BLD-MCNC: 11.9 G/DL — SIGNIFICANT CHANGE UP (ref 11.5–15.5)
MAGNESIUM SERPL-MCNC: 2.1 MG/DL — SIGNIFICANT CHANGE UP (ref 1.6–2.6)
MCHC RBC-ENTMCNC: 27.2 PG — SIGNIFICANT CHANGE UP (ref 27–34)
MCHC RBC-ENTMCNC: 32.4 GM/DL — SIGNIFICANT CHANGE UP (ref 32–36)
MCV RBC AUTO: 84 FL — SIGNIFICANT CHANGE UP (ref 80–100)
PHOSPHATE SERPL-MCNC: 3.6 MG/DL — SIGNIFICANT CHANGE UP (ref 2.4–4.7)
PLATELET # BLD AUTO: 282 K/UL — SIGNIFICANT CHANGE UP (ref 150–400)
POTASSIUM SERPL-MCNC: 3.8 MMOL/L — SIGNIFICANT CHANGE UP (ref 3.5–5.3)
POTASSIUM SERPL-SCNC: 3.8 MMOL/L — SIGNIFICANT CHANGE UP (ref 3.5–5.3)
RBC # BLD: 4.37 M/UL — SIGNIFICANT CHANGE UP (ref 3.8–5.2)
RBC # FLD: 13.2 % — SIGNIFICANT CHANGE UP (ref 10.3–14.5)
SODIUM SERPL-SCNC: 135 MMOL/L — SIGNIFICANT CHANGE UP (ref 135–145)
WBC # BLD: 8.21 K/UL — SIGNIFICANT CHANGE UP (ref 3.8–10.5)
WBC # FLD AUTO: 8.21 K/UL — SIGNIFICANT CHANGE UP (ref 3.8–10.5)

## 2022-10-26 PROCEDURE — 93970 EXTREMITY STUDY: CPT

## 2022-10-26 PROCEDURE — 83036 HEMOGLOBIN GLYCOSYLATED A1C: CPT

## 2022-10-26 PROCEDURE — 99291 CRITICAL CARE FIRST HOUR: CPT | Mod: 25

## 2022-10-26 PROCEDURE — 85610 PROTHROMBIN TIME: CPT

## 2022-10-26 PROCEDURE — 80053 COMPREHEN METABOLIC PANEL: CPT

## 2022-10-26 PROCEDURE — 83090 ASSAY OF HOMOCYSTEINE: CPT

## 2022-10-26 PROCEDURE — 36415 COLL VENOUS BLD VENIPUNCTURE: CPT

## 2022-10-26 PROCEDURE — 84443 ASSAY THYROID STIM HORMONE: CPT

## 2022-10-26 PROCEDURE — 93312 ECHO TRANSESOPHAGEAL: CPT

## 2022-10-26 PROCEDURE — 85300 ANTITHROMBIN III ACTIVITY: CPT

## 2022-10-26 PROCEDURE — 70498 CT ANGIOGRAPHY NECK: CPT | Mod: MA

## 2022-10-26 PROCEDURE — 84702 CHORIONIC GONADOTROPIN TEST: CPT

## 2022-10-26 PROCEDURE — 99232 SBSQ HOSP IP/OBS MODERATE 35: CPT

## 2022-10-26 PROCEDURE — 82962 GLUCOSE BLOOD TEST: CPT

## 2022-10-26 PROCEDURE — 85025 COMPLETE CBC W/AUTO DIFF WBC: CPT

## 2022-10-26 PROCEDURE — 97167 OT EVAL HIGH COMPLEX 60 MIN: CPT

## 2022-10-26 PROCEDURE — 0042T: CPT | Mod: MA

## 2022-10-26 PROCEDURE — 85027 COMPLETE CBC AUTOMATED: CPT

## 2022-10-26 PROCEDURE — 85613 RUSSELL VIPER VENOM DILUTED: CPT

## 2022-10-26 PROCEDURE — 93005 ELECTROCARDIOGRAM TRACING: CPT

## 2022-10-26 PROCEDURE — 97163 PT EVAL HIGH COMPLEX 45 MIN: CPT

## 2022-10-26 PROCEDURE — 84100 ASSAY OF PHOSPHORUS: CPT

## 2022-10-26 PROCEDURE — 84484 ASSAY OF TROPONIN QUANT: CPT

## 2022-10-26 PROCEDURE — 70450 CT HEAD/BRAIN W/O DYE: CPT | Mod: MA

## 2022-10-26 PROCEDURE — 85306 CLOT INHIBIT PROT S FREE: CPT

## 2022-10-26 PROCEDURE — 70496 CT ANGIOGRAPHY HEAD: CPT | Mod: MA

## 2022-10-26 PROCEDURE — 85303 CLOT INHIBIT PROT C ACTIVITY: CPT

## 2022-10-26 PROCEDURE — 93320 DOPPLER ECHO COMPLETE: CPT | Mod: 26

## 2022-10-26 PROCEDURE — 86147 CARDIOLIPIN ANTIBODY EA IG: CPT

## 2022-10-26 PROCEDURE — 92523 SPEECH SOUND LANG COMPREHEN: CPT

## 2022-10-26 PROCEDURE — 85307 ASSAY ACTIVATED PROTEIN C: CPT

## 2022-10-26 PROCEDURE — 70551 MRI BRAIN STEM W/O DYE: CPT | Mod: MA

## 2022-10-26 PROCEDURE — 93312 ECHO TRANSESOPHAGEAL: CPT | Mod: 26

## 2022-10-26 PROCEDURE — 87640 STAPH A DNA AMP PROBE: CPT

## 2022-10-26 PROCEDURE — 37195 THROMBOLYTIC THERAPY STROKE: CPT

## 2022-10-26 PROCEDURE — 87637 SARSCOV2&INF A&B&RSV AMP PRB: CPT

## 2022-10-26 PROCEDURE — 93306 TTE W/DOPPLER COMPLETE: CPT

## 2022-10-26 PROCEDURE — 99232 SBSQ HOSP IP/OBS MODERATE 35: CPT | Mod: 25

## 2022-10-26 PROCEDURE — 93320 DOPPLER ECHO COMPLETE: CPT

## 2022-10-26 PROCEDURE — 80307 DRUG TEST PRSMV CHEM ANLYZR: CPT

## 2022-10-26 PROCEDURE — 85730 THROMBOPLASTIN TIME PARTIAL: CPT

## 2022-10-26 PROCEDURE — 99233 SBSQ HOSP IP/OBS HIGH 50: CPT

## 2022-10-26 PROCEDURE — 93325 DOPPLER ECHO COLOR FLOW MAPG: CPT | Mod: 26

## 2022-10-26 PROCEDURE — 80061 LIPID PANEL: CPT

## 2022-10-26 PROCEDURE — 99239 HOSP IP/OBS DSCHRG MGMT >30: CPT

## 2022-10-26 PROCEDURE — 93325 DOPPLER ECHO COLOR FLOW MAPG: CPT

## 2022-10-26 PROCEDURE — 80048 BASIC METABOLIC PNL TOTAL CA: CPT

## 2022-10-26 PROCEDURE — 87641 MR-STAPH DNA AMP PROBE: CPT

## 2022-10-26 PROCEDURE — 83735 ASSAY OF MAGNESIUM: CPT

## 2022-10-26 PROCEDURE — 84436 ASSAY OF TOTAL THYROXINE: CPT

## 2022-10-26 PROCEDURE — 86146 BETA-2 GLYCOPROTEIN ANTIBODY: CPT

## 2022-10-26 RX ORDER — ASPIRIN/CALCIUM CARB/MAGNESIUM 324 MG
1 TABLET ORAL
Qty: 30 | Refills: 0
Start: 2022-10-26 | End: 2022-11-24

## 2022-10-26 RX ORDER — POTASSIUM CHLORIDE 20 MEQ
40 PACKET (EA) ORAL ONCE
Refills: 0 | Status: DISCONTINUED | OUTPATIENT
Start: 2022-10-26 | End: 2022-10-26

## 2022-10-26 RX ORDER — ATORVASTATIN CALCIUM 80 MG/1
1 TABLET, FILM COATED ORAL
Qty: 30 | Refills: 0
Start: 2022-10-26 | End: 2022-11-24

## 2022-10-26 RX ADMIN — Medication 81 MILLIGRAM(S): at 13:17

## 2022-10-26 NOTE — PROGRESS NOTE ADULT - NS ATTEND AMEND GEN_ALL_CORE FT
Patient was seen and examined by me. History and exam as documented above by PA/NP was confirmed by me.  Agree with plan as outlined above.
Patient seen and examined by me.  I have discussed my recommendation with the PA which are outlined above.  Will follow.
Patient was seen and examined by me. History and exam as documented above by PA/NP was confirmed by me.  Agree with plan as outlined above.

## 2022-10-26 NOTE — DISCHARGE NOTE PROVIDER - PROVIDER RX CONTACT NUMBER
Patient was notified that their INR result was 2.8  within therapeutic range of 2.0-3.0    Indications for anticoagulation therapy:  Atrial Fib    Patient was instructed to take their Coumadin/Warfarin as follows: 3/24/2017-  Continue taking 4mg daily on Mondays and Fridays and 2mg daily the rest of the week.  Recheck INR in one week    Next INR is due in 1 week(s).     Patient was instructed to contact us with any unusual bleeding, bruising, any changes in medications, diet or health status and if there are any other questions or concerns.   Patient verbalized understanding of above.    Pt states her cardiologist would like pt to discontinue Warfarin and start Eliquis.  Pt states she will pick prescription up today if affordable.  Pt states if the medication is too expensive, patient will remain on Warfarin.     
[phone]

## 2022-10-26 NOTE — DISCHARGE NOTE PROVIDER - ATTENDING DISCHARGE PHYSICAL EXAMINATION:
CC: Follow up     INTERVAL HPI/OVERNIGHT EVENTS: Patient seen and examined, no acute complaints overnight.       Vital Signs Last 24 Hrs  T(C): 36.7 (26 Oct 2022 11:15), Max: 37 (25 Oct 2022 16:01)  T(F): 98.1 (26 Oct 2022 11:15), Max: 98.6 (25 Oct 2022 16:01)  HR: 66 (26 Oct 2022 11:15) (57 - 101)  BP: 109/70 (26 Oct 2022 11:15) (100/56 - 130/74)  BP(mean): 72 (26 Oct 2022 08:30) (71 - 100)  RR: 17 (26 Oct 2022 11:15) (13 - 21)  SpO2: 100% (26 Oct 2022 11:15) (96% - 100%)    Parameters below as of 26 Oct 2022 11:15  Patient On (Oxygen Delivery Method): room air        PHYSICAL EXAM:    GENERAL: NAD, AOX3  HEAD:  Atraumatic, Normocephalic  EYES:  conjunctiva and sclera clear  ENMT: Moist mucous membranes  NECK: Supple, No JVD  NERVOUS SYSTEM:  Alert & Oriented X3, Motor Strength 5/5 B/L upper and lower extremities; DTRs 2+ intact and symmetric  CHEST/LUNG: Clear to auscultation bilaterally; No rales, rhonchi, wheezing, or rubs  HEART: Regular rate and rhythm; No murmurs, rubs, or gallops  ABDOMEN: Soft, Nontender, Nondistended; Bowel sounds present  EXTREMITIES:  2+ Peripheral Pulses, No clubbing, cyanosis, or edema        MEDICATIONS  (STANDING):  aspirin  chewable 81 milliGRAM(s) Oral daily  atorvastatin 80 milliGRAM(s) Oral at bedtime  enoxaparin Injectable 40 milliGRAM(s) SubCutaneous every 24 hours  influenza   Vaccine 0.5 milliLiter(s) IntraMuscular once  levothyroxine 75 MICROGram(s) Oral daily  polyethylene glycol 3350 17 Gram(s) Oral daily  potassium chloride   Powder 40 milliEquivalent(s) Oral once  senna 1 Tablet(s) Oral daily    MEDICATIONS  (PRN):  acetaminophen   IVPB .. 1000 milliGRAM(s) IV Intermittent once PRN Mild Pain (1 - 3), Moderate Pain (4 - 6), Severe Pain (7 - 10)  hydrALAZINE Injectable 10 milliGRAM(s) IV Push every 2 hours PRN SBP > 180  labetalol Injectable 10 milliGRAM(s) IV Push every 2 hours PRN Systolic blood pressure > 180      Allergies    No Known Allergies    Intolerances          LABS:                          11.9   8.21  )-----------( 282      ( 26 Oct 2022 04:10 )             36.7     10-26    135  |  102  |  12.0  ----------------------------<  109<H>  3.8   |  22.0  |  0.40<L>    Ca    9.2      26 Oct 2022 04:10  Phos  3.6     10-26  Mg     2.1     10-26            RADIOLOGY & ADDITIONAL TESTS:

## 2022-10-26 NOTE — DISCHARGE NOTE PROVIDER - PROVIDER TOKENS
PROVIDER:[TOKEN:[[id number]],FOLLOWUP:[1 week]],PROVIDER:[TOKEN:[[id number]],FOLLOWUP:[1 week]],PROVIDER:[TOKEN:[[id number]],FOLLOWUP:[2 weeks]]

## 2022-10-26 NOTE — PROGRESS NOTE ADULT - SUBJECTIVE AND OBJECTIVE BOX
Patient going down for tests.  Reports she is less fatigued.    REVIEW OF SYSTEMS  Constitutional - No fever,  No fatigue  HEENT - No vertigo, No neck pain  Neurological - No headaches, No memory loss, No loss of strength, No numbness, No tremors  Musculoskeletal - No joint pain, No joint swelling, No muscle pain  Psychiatric - No depression, No anxiety    FUNCTIONAL PROGRESS  10/25 PT  Bed Mobility: Rolling/Turning:     · Level of Jeannette	supervision  · Physical Assist/Nonphysical Assist	1 person assist    Bed Mobility: Scooting/Bridging:     · Level of Jeannette	supervision  · Physical Assist/Nonphysical Assist	1 person assist    Bed Mobility: Supine to Sit:     · Level of Jeannette	supervision  · Physical Assist/Nonphysical Assist	1 person assist    Bed Mobility Analysis:     · Impairments Contributing to Impaired Bed Mobility	impaired balance; decreased strength; fatigue    Transfer: Sit to Stand:     · Level of Jeannette	contact guard  · Physical Assist/Nonphysical Assist	1 person assist  · Weight-Bearing Restrictions	weight-bearing as tolerated  · Assistive Device	no device    Transfer: Stand to Sit:     · Level of Jeannette	minimum assist (75% patients effort)  · Weight-Bearing Restrictions	weight-bearing as tolerated  · Assistive Device	no device    Sit/Stand Transfer Safety Analysis:     · Transfer Safety Concerns Noted	Pt required increased assist to Sit in chair 2*2 c/o dizziness, "pinching" chest pain and feeling "warm" BP assessed in chair at 115/81, Pt transported back to room in chair, RN aware and present during events. Pt report resolution of symptoms after seated rest break.    Gait Skills:     · Level of Jeannette	minimum assist (75% patients effort)  · Physical Assist/Nonphysical Assist	1 person assist  · Weight-Bearing Restrictions	weight-bearing as tolerated  · Assistive Device	no device  · Gait Distance	35 feet    Gait Analysis:     · Gait Pattern Used	2-point gait  · Gait Deviations Noted	decreased darshan; decreased weight-shifting ability  · Impairments Contributing to Gait Deviations	impaired balance; decreased strength; fatigue    10/25 OT  Bathing Training:     · Level of Jeannette	minimum assist (75% patients effort)    Upper Body Dressing Training:     · Level of Jeannette	stand-by assist  · Physical Assist/Nonphysical Assist	set-up required    Lower Body Dressing Training:     · Level of Jeannette	minimum assist (75% patients effort)    Toilet Hygiene Training:     · Level of Jeannette	stand-by assist  · Physical Assist/Nonphysical Assist	set-up required    Grooming Training:     · Level of Jeannette	stand-by assist  · Physical Assist/Nonphysical Assist	set-up required    Eating/Self-Feeding Training:     · Level of Jeannette	TBA    10/25 SLP  · Diagnosis	Receptive & expressive language skills WNL, speech intelligibility WFL. Formal assessment of cognition to be completed  · Patient/Family Goals Statement	"I'm doing ok"  · Comments	Will follow for completion of formal cognitive evaluation    Behavioral Exam:   · Behavioral Observations	alert  · Orientation	x3: 100%    Auditory Comprehension:   · Able to Identify Objects	within functional limits  · Answers Yes/No Questions	within functional limits  · Able to Follow Commands	within functional limits  · Discourse	intact  · Right/Left Discrimination	intact  · Body Part ID	intact  · Open Ended Questions	intact    Verbal Expression:   · Verbal Expression	intact  · Automatic Speech	intact  · Confrontational Naming	intact  · Responsive Naming	intact  · Repetition	intact; phrase; word; sentence  · Fluency	14 words per minute, slightly delayed responses  · Conversational Speech	fluent, appropriate semantics & syntax    Cognitive Linguistic Status Examination:   · Diffuse Language Characteristics	no  · Executive Functions	to be assessed  · Comments	Periods of delayed responses noted with more complex tasks    Formal assessment to be completed    Motor Speech:   · Comments	WFL    Voice:   · Voice	hoarse; Pt reportedly not sleeping well, ? due to fatigue, will monitor  · Volume	WFL    VITALS  T(C): 36.7 (10-26-22 @ 11:15), Max: 37 (10-25-22 @ 16:01)  HR: 66 (10-26-22 @ 11:15) (57 - 101)  BP: 109/70 (10-26-22 @ 11:15) (100/56 - 130/74)  RR: 17 (10-26-22 @ 11:15) (13 - 25)  SpO2: 100% (10-26-22 @ 11:15) (96% - 100%)  Wt(kg): --    MEDICATIONS   acetaminophen   IVPB .. 1000 milliGRAM(s) once PRN  aspirin  chewable 81 milliGRAM(s) daily  atorvastatin 80 milliGRAM(s) at bedtime  enoxaparin Injectable 40 milliGRAM(s) every 24 hours  hydrALAZINE Injectable 10 milliGRAM(s) every 2 hours PRN  influenza   Vaccine 0.5 milliLiter(s) once  labetalol Injectable 10 milliGRAM(s) every 2 hours PRN  levothyroxine 75 MICROGram(s) daily  polyethylene glycol 3350 17 Gram(s) daily  potassium chloride   Powder 40 milliEquivalent(s) once  senna 1 Tablet(s) daily      RECENT LABS/IMAGING                          11.9   8.21  )-----------( 282      ( 26 Oct 2022 04:10 )             36.7     10-26    135  |  102  |  12.0  ----------------------------<  109<H>  3.8   |  22.0  |  0.40<L>    Ca    9.2      26 Oct 2022 04:10  Phos  3.6     10-26  Mg     2.1     10-26    TPro  7.2  /  Alb  4.4  /  TBili  0.2<L>  /  DBili  x   /  AST  20  /  ALT  27  /  AlkPhos  81  10-24    PT/INR - ( 24 Oct 2022 14:02 )   PT: 12.6 sec;   INR: 1.09 ratio         PTT - ( 24 Oct 2022 14:02 )  PTT:27.9 sec            HEAD CT: No acute hemorrhage or midline shift.    CT PERFUSION demonstrated: No core infarct. No active ischemia. If symptoms persist consider follow up head CT or MRI, MRA  if no contraindication.    CTA COW:  Patent intracranial circulation without flow limiting stenosis.    CTA NECK: Patent, ECAs, ICAs, no  hemodynamically significant stenosis at  ICA origins by NASCET criteria. Bilateral vertebral arteries are patent without flow limiting stenosis.    MRI HEAD 10/25 - Unremarkable brain MR for patient age. No acute infarct.    MYRON 10/26 -    1. Left ventricular ejection fraction, by visual estimation, is 55 to   60%.   2. Normal global left ventricular systolic function.   3. Normal right ventricular size and function.   4. Normal left atrial size.   5. Normal right atrial size.   6. There is no evidence of pericardial effusion.   7. No evidence of mitral valve regurgitation.   8. No intracavitary mass or thrombus. No findings suggestive of   vegetation.   9. Color flow doppler and intravenous injection of agitated saline   demonstrates the presence of an intact intra atrial septum.  10. No left atrial appendage thrombus and normal left atrial appendage   velocities.  ----------------------------------------------------------------------------------------  PHYSICAL EXAM  Constitutional - NAD, Comfortable  Extremities - No C/C/E, No calf tenderness   Neurologic Exam -                    Cognitive - AAO to self, place, date, year, situation     Communication - Fluent, No dysarthria     Cranial Nerves - CN 2-12 intact     Motor - No focal deficits     Sensory - Intact to LT     Coordination - FTN intact  Psychiatric - Mood stable, Affect WNL  ----------------------------------------------------------------------------------------  ASSESSMENT/PLAN  27yFemale with functional deficits after developing left sided weakness  Left sided weakness s/p tPA - ASA, Lipitor   HTN - Hydralazine, Labetalol  Pain - Tylenol  DVT PPX - SCDs  Rehab - Workup for CVA negative. Functionally not independent but expect given exam and workup, plan is for DC HOME.     Will continue to follow. Rehab recommendations are dependent on how functional progress changes as well as how patient continues to participate and tolerate therapeutic interventions, which may change. Recommend ongoing mobilization by staff to maintain cardiopulmonary function and prevention of secondary complications related to debility. Discussed with rehab team.

## 2022-10-26 NOTE — DISCHARGE NOTE PROVIDER - NSDCCPCAREPLAN_GEN_ALL_CORE_FT
PRINCIPAL DISCHARGE DIAGNOSIS  Diagnosis: Cerebrovascular accident (CVA)  Assessment and Plan of Treatment: Continue aspirin 81 mg PO Once a day  LIpitor 80mg PO Once a day  Follow up with neurology in 1 week  You will need to follow up with cardiology in 1 week to set up an outaptient cardiac monitor to rule out cardiac cause of stroke  You will need to schedule an appointment with Dr Sanchez hematology for a full hypercoagulable work up to rule out clotting disorder as a cause of stroke      SECONDARY DISCHARGE DIAGNOSES  Diagnosis: Hypothyroidism  Assessment and Plan of Treatment: Continue synthroid 75mcg PO Once a day   Follow up with your PMD for repeat thryoid panel in 4 weeks

## 2022-10-26 NOTE — PROGRESS NOTE ADULT - PROBLEM SELECTOR PLAN 1
TTE with bubble study pending  MYRON today  NPO tonight  Consider loop or outpatient [facility] if MYRON is negative. TTE with bubble study pending  MYRON today  NPO tonight  Consider  outpatient [facility] if MYRON is negative.

## 2022-10-26 NOTE — DISCHARGE NOTE PROVIDER - CARE PROVIDER_API CALL
Martina Varghese (NP; RN)  NP in Adult Health  [address] [place] [address]  Phone: [phone]  Fax: [phone]  Follow Up Time: 1 week    David Adams)  Cardiology; Cardiovascular Disease; Internal Medicine  [address] [place] [address]  Phone: [phone]  Fax: [phone]  Follow Up Time: 1 week    Werner Sanchez)  Medical Oncology  [address] [address]  [place] [address]  Phone: [phone]  Fax: [phone]  Follow Up Time: 2 weeks

## 2022-10-26 NOTE — PROGRESS NOTE ADULT - SUBJECTIVE AND OBJECTIVE BOX
[facility]                                                         CARDIOLOGY AT [facility]                                                                  [address] [address]                                                         Telephone: [phone]. Fax:[phone]                                                                             PROGRESS NOTE    Reason for follow up:   Update:   [facility] today as discussed with ACP'  Review of symptoms:   Cardiac:  No chest pain. No dyspnea. No palpitations.  Respiratory: no cough. No dyspnea  Gastrointestinal: No diarrhea. No abdominal pain. No bleeding.   Neuro: No focal neuro complaints.    Vitals:  T(C): 36.7 (10-26-22 @ 08:53), Max: 37.1 (10-25-22 @ 11:55)  HR: 83 (10-26-22 @ 08:53) (57 - 94)  BP: 130/74 (10-26-22 @ 08:53) (100/56 - 130/74)  RR: 14 (10-26-22 @ 08:53) (12 - 25)  SpO2: 98% (10-26-22 @ 08:00) (96% - 100%)    I&O's Summary  25 Oct 2022 07:01  -  26 Oct 2022 07:00  --------------------------------------------------------  IN: 820 mL / OUT: 1191 mL / NET: -371 mL  Weight (kg): 72.6 (10-24 @ 13:50)    PHYSICAL EXAM:  Appearance: Comfortable. No acute distress,   HEENT:  Atraumatic. Normocephalic.  Normal oral mucosa  Neurologic: A & O x 3, no gross focal deficits.  Cardiovascular:  Regular, S1 S2, no  murmur, no rubs/gallops. No JVD  Respiratory: Lungs clear to auscultation, unlabored   Gastrointestinal:  Soft, Non-tender, + BS  Lower Extremities: + Peripheral Pulses, No clubbing, cyanosis, or edema  Psychiatry: Patient is calm. No agitation.   Skin: warm and dry.    CURRENT CARDIAC MEDICATIONS:  hydrALAZINE Injectable 10 milliGRAM(s) IV Push every 2 hours PRN  labetalol Injectable 10 milliGRAM(s) IV Push every 2 hours PRN    CURRENT OTHER MEDICATIONS:  acetaminophen   IVPB .. 1000 milliGRAM(s) IV Intermittent once PRN Mild Pain (1 - 3), Moderate Pain (4 - 6), Severe Pain (7 - 10)  polyethylene glycol 3350 17 Gram(s) Oral daily  senna 1 Tablet(s) Oral daily  atorvastatin 80 milliGRAM(s) Oral at bedtime  levothyroxine 75 MICROGram(s) Oral daily  aspirin  chewable 81 milliGRAM(s) Oral daily  chlorhexidine 2% Cloths 1 Application(s) Topical daily  enoxaparin Injectable 40 milliGRAM(s) SubCutaneous every 24 hours  influenza   Vaccine 0.5 milliLiter(s) IntraMuscular once  potassium chloride   Powder 40 milliEquivalent(s) Oral once, Stop order after: 1 Doses    LABS:	 	  ( 24 Oct 2022 14:02 )  Troponin T  <0.01,  CPK  X    , CKMB  X    , BNP X                          11.9   8.21  )-----------( 282      ( 26 Oct 2022 04:10 )             36.7     10-26    135  |  102  |  12.0  ----------------------------<  109<H>  3.8   |  22.0  |  0.40<L>    Ca    9.2      26 Oct 2022 04:10  Phos  3.6     10-26  Mg     2.1     10-26    TPro  7.2  /  Alb  4.4  /  TBili  0.2<L>  /  DBili  x   /  AST  20  /  ALT  27  /  AlkPhos  81  10-24    PT/INR/PTT ( 24 Oct 2022 14:02 )      12.6         :       27.9                  .        .                   .              .           .       1.09        .                                       Lipid Profile: Date: 10-25 @ 02:40  Total cholesterol 161; Direct LDL: --; HDL: 35; Triglycerides:113  TSH: Thyroid Stimulating Hormone, Serum: 12.27 uIU/mL  Thyroid Stimulating Hormone, Serum: 5.04 uIU/mL    TELEMETRY:  sr, no acute alarms

## 2022-10-26 NOTE — DISCHARGE NOTE PROVIDER - CARE PROVIDERS DIRECT ADDRESSES
,DirectAddress_Unknown,[email or web address],DirectAddress_Unknown

## 2022-10-26 NOTE — DISCHARGE NOTE PROVIDER - NSDCQMMRS_NEU_ALL_CORE
Saritha [facility]gr[facility]   A [facility], [address] [place] [address]    Fax: [phone]  Voice: [phone]    [address] [place] [address]        Patient Name: Philip Skaggs     : 1935    Prescription  or Order:  Transfer slide board    Diagnosis: R53.81    #:__1___  Generic OK:_yes_      Refills or length of need: 99 months      ___________________ Date:____________  Kathryn Rodriguez M.D. 0 - No symptoms

## 2022-10-26 NOTE — PROGRESS NOTE ADULT - NS ATTEND BILL GEN_ALL_CORE
----- Message from Raymond Sánchez sent at 3/6/2017 10:36 AM CST -----  Contact: Jessica/PT  Jessica called in stating that she is with the pt now and would like a verbal order for one more visit with the pt. Jessica can be reached at [phone].      
Spoke with Jessica and gave her verbal orders regarding  being cleared for one more day of PT per  request.  
Attending to bill

## 2022-10-26 NOTE — PROGRESS NOTE ADULT - ASSESSMENT
27-year-old female with history of hypothyroid and PE in pregnancy (at 5 months gestation in 2016), treated with Lovenox for duration of pregnancy, had negative hypercoagulable workup outpatient.   AC discontinued after delivery.     Presented to ED today for dizziness with nausea yesterday at 11am.  These symptoms were followed by left sided weakness and numbness.  Patient  TPA administered yesterday 13:55.  Patient had symptom relief after TPA.  A cardiac consult was called due to new stroke with unknown etiology.

## 2022-10-26 NOTE — PROGRESS NOTE ADULT - SUBJECTIVE AND OBJECTIVE BOX
Preliminary note, official recommendations pending attending signature/review     HPI:  27-year-old female with history of hypothyroid and PE in pregnancy (at 5 months gestation in 2016), treated with Lovenox for duration of pregnancy, had negative hypercoaguable workup, AC discontinued after delivery. Presented to ED today stating she had dizziness with nausea this morning at 11am. She reports that these symptoms were followed by left sided weakness and numbness. Dizziness/nausea have resolved.  On arrival to CTH/CTA/CTP negative. NIHSS of 4, TPA administered at 13:55.  NIH: 4 mRs: 0    SUBJECTIVE: No events overnight.  No new neurologic complaints.  ROS reported negative unless otherwise noted.    acetaminophen   IVPB .. 1000 milliGRAM(s) IV Intermittent once PRN  aspirin  chewable 81 milliGRAM(s) Oral daily  atorvastatin 80 milliGRAM(s) Oral at bedtime  chlorhexidine 2% Cloths 1 Application(s) Topical daily  enoxaparin Injectable 40 milliGRAM(s) SubCutaneous every 24 hours  hydrALAZINE Injectable 10 milliGRAM(s) IV Push every 2 hours PRN  influenza   Vaccine 0.5 milliLiter(s) IntraMuscular once  labetalol Injectable 10 milliGRAM(s) IV Push every 2 hours PRN  levothyroxine 75 MICROGram(s) Oral daily  polyethylene glycol 3350 17 Gram(s) Oral daily  potassium chloride   Powder 40 milliEquivalent(s) Oral once  senna 1 Tablet(s) Oral daily      PHYSICAL EXAM:   Vital Signs Last 24 Hrs  T(C): 36.7 (26 Oct 2022 08:53), Max: 37.1 (25 Oct 2022 11:55)  T(F): 98.1 (26 Oct 2022 08:53), Max: 98.7 (25 Oct 2022 11:55)  HR: 83 (26 Oct 2022 08:53) (57 - 94)  BP: 130/74 (26 Oct 2022 08:53) (100/56 - 130/74)  BP(mean): 81 (26 Oct 2022 08:00) (71 - 100)  RR: 14 (26 Oct 2022 08:53) (12 - 25)  SpO2: 98% (26 Oct 2022 08:00) (96% - 100%)    Parameters below as of 26 Oct 2022 08:53  Patient On (Oxygen Delivery Method): room air        General: No acute distress      NEUROLOGICAL EXAM:  Mental status: Awake, alert, oriented x3, speech fluent, follows single and cross midline commands, no neglect, normal memory   Cranial Nerves: No facial asymmetry, no nystagmus, no dysarthria,  tongue midline  Motor exam: Normal tone, no drift, 5/5 RUE, 5/5 RLE, 5/5 LUE, 5/5 LLE, normal fine finger movements.  Sensation: Intact to light touch     LABS:                        11.9   8.21  )-----------( 282      ( 26 Oct 2022 04:10 )             36.7    10-26    135  |  102  |  12.0  ----------------------------<  109<H>  3.8   |  22.0  |  0.40<L>    Ca    9.2      26 Oct 2022 04:10  Phos  3.6     10-26  Mg     2.1     10-26    TPro  7.2  /  Alb  4.4  /  TBili  0.2<L>  /  DBili  x   /  AST  20  /  ALT  27  /  AlkPhos  81  10-24  PT/INR - ( 24 Oct 2022 14:02 )   PT: 12.6 sec;   INR: 1.09 ratio         PTT - ( 24 Oct 2022 14:02 )  PTT:27.9 sec      IMAGING: Reviewed by me.     MR Head No Cont (10.25.22 @ 18:54)   Unremarkable brain MR for patient age. No acute infarct.    (10.24.22 @ 13:53)   CT PERFUSION demonstrated: No core infarct. No active ischemia.  If symptomspersist consider follow up head CT or MRI, MRA  if no   contraindication.  CTA COW:  Patent intracranial circulation without flow limiting stenosis.  CTA NECK: Patent, ECAs, ICAs, no  hemodynamically significant stenosis at    ICA origins by NASCET criteria.  Bilateral vertebral arteries are patent without flow limiting stenosis.    (10.24.22 @ 13:38)   HEAD CT: No acute hemorrhage or midline shift.     TTE Echo Complete w/o Contrast w/ Doppler (10.25.22 @ 10:13)    1. Left ventricular ejection fraction, by visual estimation, is 60 to   65%.   2. Normal global left ventricular systolic function.   3. Mild mitral valve regurgitation.          HPI:  27-year-old female with history of hypothyroid and PE in pregnancy (at 5 months gestation in 2016), treated with Lovenox for duration of pregnancy, had negative hypercoaguable workup, AC discontinued after delivery. Presented to ED today stating she had dizziness with nausea this morning at 11am. She reports that these symptoms were followed by left sided weakness and numbness. Dizziness/nausea have resolved.  On arrival to CTH/CTA/CTP negative. NIHSS of 4, TPA administered at 13:55.  NIH: 4 mRs: 0    SUBJECTIVE: No events overnight.  No new neurologic complaints.  ROS reported negative unless otherwise noted.    acetaminophen   IVPB .. 1000 milliGRAM(s) IV Intermittent once PRN  aspirin  chewable 81 milliGRAM(s) Oral daily  atorvastatin 80 milliGRAM(s) Oral at bedtime  chlorhexidine 2% Cloths 1 Application(s) Topical daily  enoxaparin Injectable 40 milliGRAM(s) SubCutaneous every 24 hours  hydrALAZINE Injectable 10 milliGRAM(s) IV Push every 2 hours PRN  influenza   Vaccine 0.5 milliLiter(s) IntraMuscular once  labetalol Injectable 10 milliGRAM(s) IV Push every 2 hours PRN  levothyroxine 75 MICROGram(s) Oral daily  polyethylene glycol 3350 17 Gram(s) Oral daily  potassium chloride   Powder 40 milliEquivalent(s) Oral once  senna 1 Tablet(s) Oral daily      PHYSICAL EXAM:   Vital Signs Last 24 Hrs  T(C): 36.7 (26 Oct 2022 08:53), Max: 37.1 (25 Oct 2022 11:55)  T(F): 98.1 (26 Oct 2022 08:53), Max: 98.7 (25 Oct 2022 11:55)  HR: 83 (26 Oct 2022 08:53) (57 - 94)  BP: 130/74 (26 Oct 2022 08:53) (100/56 - 130/74)  BP(mean): 81 (26 Oct 2022 08:00) (71 - 100)  RR: 14 (26 Oct 2022 08:53) (12 - 25)  SpO2: 98% (26 Oct 2022 08:00) (96% - 100%)    Parameters below as of 26 Oct 2022 08:53  Patient On (Oxygen Delivery Method): room air        General: No acute distress      NEUROLOGICAL EXAM:  Mental status: Awake, alert, oriented x3, speech fluent, follows single and cross midline commands, no neglect, normal memory   Cranial Nerves: No facial asymmetry, no nystagmus, no dysarthria,  tongue midline  Motor exam: Normal tone, no drift, 5/5 RUE, 5/5 RLE, 5/5 LUE, 5/5 LLE, normal fine finger movements.  Sensation: Intact to light touch     LABS:                        11.9   8.21  )-----------( 282      ( 26 Oct 2022 04:10 )             36.7    10-26    135  |  102  |  12.0  ----------------------------<  109<H>  3.8   |  22.0  |  0.40<L>    Ca    9.2      26 Oct 2022 04:10  Phos  3.6     10-26  Mg     2.1     10-26    TPro  7.2  /  Alb  4.4  /  TBili  0.2<L>  /  DBili  x   /  AST  20  /  ALT  27  /  AlkPhos  81  10-24  PT/INR - ( 24 Oct 2022 14:02 )   PT: 12.6 sec;   INR: 1.09 ratio         PTT - ( 24 Oct 2022 14:02 )  PTT:27.9 sec      IMAGING: Reviewed by me.     MR Head No Cont (10.25.22 @ 18:54)   Unremarkable brain MR for patient age. No acute infarct.    (10.24.22 @ 13:53)   CT PERFUSION demonstrated: No core infarct. No active ischemia.  If symptomspersist consider follow up head CT or MRI, MRA  if no   contraindication.  CTA COW:  Patent intracranial circulation without flow limiting stenosis.  CTA NECK: Patent, ECAs, ICAs, no  hemodynamically significant stenosis at    ICA origins by NASCET criteria.  Bilateral vertebral arteries are patent without flow limiting stenosis.    (10.24.22 @ 13:38)   HEAD CT: No acute hemorrhage or midline shift.     TTE Echo Complete w/o Contrast w/ Doppler (10.25.22 @ 10:13)    1. Left ventricular ejection fraction, by visual estimation, is 60 to   65%.   2. Normal global left ventricular systolic function.   3. Mild mitral valve regurgitation.

## 2022-10-26 NOTE — DIETITIAN INITIAL EVALUATION ADULT - NSFNSGIIOFT_GEN_A_CORE
10-25-22 @ 07:01  -  10-26-22 @ 07:00  --------------------------------------------------------  OUT:    Stool (mL): 1 mL  Total OUT: 1 mL    Total NET: -1 mL

## 2022-10-26 NOTE — DIETITIAN INITIAL EVALUATION ADULT - ORAL INTAKE PTA/DIET HISTORY
Pt in cath lab during assessment for MYRON. Pt remains NPO at this time for procedure. RD to follow up to obtain hx and provide education as feasible.

## 2022-10-26 NOTE — DISCHARGE NOTE PROVIDER - NSDCMRMEDTOKEN_GEN_ALL_CORE_FT
aspirin 81 mg oral tablet, chewable: 1 tab(s) orally once a day  atorvastatin 80 mg oral tablet: 1 tab(s) orally once a day (at bedtime)  Synthroid 75 mcg (0.075 mg) oral tablet: 1 tab(s) orally once a day  Zofran 4 mg oral tablet: 1 tab(s) orally every 8 hours

## 2022-10-26 NOTE — DIETITIAN INITIAL EVALUATION ADULT - PERTINENT LABORATORY DATA
10-26    135  |  102  |  12.0  ----------------------------<  109<H>  3.8   |  22.0  |  0.40<L>    Ca    9.2      26 Oct 2022 04:10  Phos  3.6     10-26  Mg     2.1     10-26    TPro  7.2  /  Alb  4.4  /  TBili  0.2<L>  /  DBili  x   /  AST  20  /  ALT  27  /  AlkPhos  81  10-24  POCT Blood Glucose.: 100 mg/dL (10-25-22 @ 12:06)  A1C with Estimated Average Glucose Result: 5.4 % (10-25-22 @ 02:40)

## 2022-10-26 NOTE — DIETITIAN INITIAL EVALUATION ADULT - PERTINENT MEDS FT
MEDICATIONS  (STANDING):  aspirin  chewable 81 milliGRAM(s) Oral daily  atorvastatin 80 milliGRAM(s) Oral at bedtime  chlorhexidine 2% Cloths 1 Application(s) Topical daily  enoxaparin Injectable 40 milliGRAM(s) SubCutaneous every 24 hours  influenza   Vaccine 0.5 milliLiter(s) IntraMuscular once  levothyroxine 75 MICROGram(s) Oral daily  polyethylene glycol 3350 17 Gram(s) Oral daily  potassium chloride   Powder 40 milliEquivalent(s) Oral once  senna 1 Tablet(s) Oral daily    MEDICATIONS  (PRN):  acetaminophen   IVPB .. 1000 milliGRAM(s) IV Intermittent once PRN Mild Pain (1 - 3), Moderate Pain (4 - 6), Severe Pain (7 - 10)  hydrALAZINE Injectable 10 milliGRAM(s) IV Push every 2 hours PRN SBP > 180  labetalol Injectable 10 milliGRAM(s) IV Push every 2 hours PRN Systolic blood pressure > 180

## 2022-10-26 NOTE — PROGRESS NOTE ADULT - ASSESSMENT
ASSESSMENT: 27-year-old female with history of hypothyroid and PE in pregnancy (at 5 months gestation in 2016), treated with Lovenox for duration of pregnancy, had negative hypercoaguable workup, AC discontinued after delivery. Presented to ED 10/24/22 stating she had dizziness with nausea this morning since 11am. She reported that these symptoms were followed by left sided weakness and numbness. Dizziness/nausea have resolved.  On arrival to CTH/CTA/CTP negative. NIHSS of 4, TPA administered at 13:55 10/24/22.      IMPRESSION:  -Left hemiparesis and paresthesias S/P  iv tPA  TIA vs. TPA aborted right hemispheric cerebral embolism without cerebral infarction of undetermined source.     NEURO: neurologically overall improved, Continue close monitoring for neurologic deterioration, permissive HTN up to 180mmHg with gradual normotension as tolerated, statin not indicated as non atherosclerotic etiology- diet/lifestyle modifications, MRI Brain as noted,  Physical therapy/OT eval   Outpatient PT; balance PT, No skilled OT needs; home with assist     ANTITHROMBOTIC THERAPY: ASA 81mg      - TTE as noted above, would obtain w/ bubble, cardiac monitoring to ensure no occult arrhythmia- can be with 30 day holter and pending findings of workup consider further long term with ILR                               SBP goal: 100-120mmHg being clinically tolerated at this time, would avoid further hypotension, gradual normotension as tolerated     -dysphagia screen passed, advance diet as tolerated       -avoid hyponatremia     -she has a significant history of VTE during pregnancy which she was placed on FD LMWH at the time and it was subsequently d/c after pregnancy given resolution per patient, she was following with Dr. Sanchez but has not followed up, she was at work at the time of her last apt. It was strongly encouraged she follow up.  repeat hypercoagulable panel- full panel pending results. Would consult hematology and ensure post dc follow up. This was d/w patient and strongly encouraged as to guide treatment for secondary prevention.    -LE duplex without evidence of DVT      DVT ppx: LMWH    -Seh should have all age and risk appropriate malignancy screenings.     -Endocrine follow up for further care of hypothyroid      CORE MEASURES:        Admission NIHSS: 4     TPA: [x] YES [] NO      LDL/HDL: 103/35     Depression Screen: p     Statin Therapy: as noted      Dysphagia Screen: [x] PASS [] FAIL     Smoking [] YES [x] NO      Afib [] YES [x] NO     Stroke Education [x] YES [] NO    Obtain screening lower extremity venous ultrasound in patients who meet 1 or more of the following criteria as patient is high risk for DVT/PE on admission:   [x] History of DVT/PE  []Hypercoagulable states (Factor V Leiden, Cancer, OCP, etc. )  []Prolonged immobility (hemiplegia/hemiparesis/post operative or any other extended immobilization)  [] Transferred from outside facility (Rehab or Long term care)  [x] Age </= to 50

## 2022-10-26 NOTE — DISCHARGE NOTE PROVIDER - HOSPITAL COURSE
The patient is a 27 year old female with a past medical history of  hypothyroid and PE in pregnancy (at 5 months gestation in 2016), treated with Lovenox for duration of pregnancy,  AC discontinued after delivery. Presented to ED stating she had dizziness with nausea  followed by left sided weakness and numbness and LOC.   On arrival to CTH/CTA/CTP negative. NIHSS of 4, TPA administered at 13:55.  Repeat CT head was negative. MRI of the brain negative for Stroke. TTE with bubble study was negative; normal LVSF. MYRON was negative for PFO.  Seen by PT/OT and ST and cleared. To follow up with cardiology as outpatient for MCOT. Needs to follow up with hematology for hypercoaguable work up given recent PE. Continue aspirin and statin

## 2022-10-26 NOTE — PROGRESS NOTE ADULT - SUBJECTIVE AND OBJECTIVE BOX
Patient received for MYRON to assess for Cardioembolic source for CVA.    HPI:  27-year-old female with history of hypothyroid and PE in pregnancy (at 5 months gestation in 2016), treated with Lovenox for duration of pregnancy, had negative hypercoagulable workup outpatient   AC discontinued after delivery.     Presented to ED today for dizziness with nausea yesterday at 11am.  These symptoms were followed by left sided weakness and numbness.  Patient  TPA administered yesterday 13:55.  Patient had symptom relief after TPA.  A cardiac consult was called due to new stroke with unknown etiology,   Denies dizziness, headache syncope, presyncope chest pain, palpitations, cough sob, pnd, orthopnea n/v/d, constipation weakness or muscle cramps         Patient A&O x 3  Lungs CTA  S1S2 no MRG  Neuro - no deficits     Pt NPO for MYRON

## 2022-10-27 ENCOUNTER — NON-APPOINTMENT (OUTPATIENT)
Age: 27
End: 2022-10-27

## 2022-10-27 LAB — DRUG SCREEN, SERUM: SIGNIFICANT CHANGE UP

## 2022-10-28 LAB — PROT S FREE PPP-ACNC: 48 % — LOW (ref 63–140)

## 2022-11-02 ENCOUNTER — APPOINTMENT (OUTPATIENT)
Age: 27
End: 2022-11-02

## 2022-11-02 ENCOUNTER — NON-APPOINTMENT (OUTPATIENT)
Age: 27
End: 2022-11-02

## 2022-11-02 VITALS
HEIGHT: 59 IN | WEIGHT: 178.7 LBS | DIASTOLIC BLOOD PRESSURE: 72 MMHG | SYSTOLIC BLOOD PRESSURE: 118 MMHG | HEART RATE: 82 BPM | BODY MASS INDEX: 36.03 KG/M2 | TEMPERATURE: 98.1 F | OXYGEN SATURATION: 98 %

## 2022-11-02 DIAGNOSIS — Z86.711 PERSONAL HISTORY OF PULMONARY EMBOLISM: ICD-10-CM

## 2022-11-02 PROCEDURE — 99215 OFFICE O/P EST HI 40 MIN: CPT

## 2022-11-02 NOTE — DISCUSSION/SUMMARY
[Antithrombotic therapy with ___] : antithrombotic therapy with  [unfilled] [Lipid Lowering Therapy] : lipid lowering therapy [Patient encouraged to discuss with Primary MD] : I encouraged the patient to discuss these important issues with ~his/her~ primary care doctor [Goals and Counseling] : I have reviewed the goals of stroke risk factor modification. I counseled the patient on measures to reduce stroke risk, including the importance of medication compliance, risk factor control, exercise, healthy diet and avoidance of smoking. I reviewed stroke warning signs and symptoms and appropriate actions to take if such occur. [FreeTextEntry1] : Ms. Moreau is a 27 year-old woman with PMH PE (2016, during pregnancy) who presented to the office today for post hospital evaluation for acute onset of stroke-like symptoms, since then resolved post tPA. MRI brain negative for acute ischemic/hemorrhagic infarction. At this time it is unclear if symptoms were 2/2 complex basilar migraine vs. alteplase aborted stroke vs other.\par - recommend follow-up with hematology for hypercoagulable work-up \par - recommend cardiology follow up for ILR placement.\par - may decrease atorvastatin dose to 40mg QHS - \par - continue ASA 81mg daily for secondary stroke prevention prophylaxis\par Follow-up with me in 6-8 weeks, and PRN \par  All of her questions and concerns were addressed. \par \par \par

## 2022-11-02 NOTE — REVIEW OF SYSTEMS
[As Noted in HPI] : as noted in HPI [Fever] : no fever [Chills] : no chills [Confused or Disoriented] : no confusion [Memory Lapses or Loss] : no memory loss [Decr. Concentrating Ability] : no decrease in concentrating ability [Difficulty with Language] : no ~M difficulty with language [Facial Weakness] : no facial weakness [Arm Weakness] : no arm weakness [Hand Weakness] : no hand weakness [Leg Weakness] : no leg weakness [Poor Coordination] : good coordination [Numbness] : no numbness [Tingling] : no tingling [Seizures] : no convulsions [Vertigo] : no vertigo [Difficulty Walking] : no difficulty walking [Frequent Falls] : not falling [Anxiety] : no anxiety [Depression] : no depression [Eye Pain] : no eye pain [Eyesight Problems] : no eyesight problems [Earache] : no earache [Loss Of Hearing] : no hearing loss [Chest Pain] : no chest pain [Palpitations] : no palpitations [Shortness Of Breath] : no shortness of breath [Cough] : no cough

## 2022-11-02 NOTE — HISTORY OF PRESENT ILLNESS
[FreeTextEntry1] : Ms. Moreau is a 27 year-old woman with PMH PE (2016, during pregnancy), who presented to the ED at [facility] on 10/27/22 with c/o left-sided weakness and diminished sensation and left facial droop. She was at home when she began feeling dizzy and lightheaded. She ate food thinking she was hypoglycemic, began feeling worse, sat down on the couch, and lost conciseness. She woke up in the ambulance on her way to the hospital. Her exam in the ED was significant for left-sided weakness/numbness with NIHSS of 4, and alteplase was administered. She developed a mild headache after alteplase administration, weakness/numbness resolved within a few hours post infusion. MRI brain negative for acute infarction and post alteplase CTH was negative for a hemorrhage. Pt reports being at her baseline post hospitalization, and denies any focal weakness, numbness, visual changes, speech or language abnormalities.\par \par She had hematology work-up performed in the past at the time of DVT and was placed on Lovenox for the duration of her pregnancy. She is pending follow-up with hematology next week. \par Pt denies hx of migraines or seizures.

## 2022-11-02 NOTE — PHYSICAL EXAM
[FreeTextEntry1] : GENERAL PHYSICAL EXAM:\par GEN: no distress, normal affect\par HEENT: NCAT, OP clear\par EYES: sclera white, conjunctiva clear\par NECK: supple\par CV: normal rhythm\par PULM: no respiratory distress, normal rhythm and effort\par EXT: no edema, no cyanosis\par MSK: muscle tone and strength normal\par SKIN: warm, dry, no rash or lesion on exposed skin \par \par NEUROLOGICAL EXAM:\par Mental Status\par Orientation: alert and oriented to person, place, time, and situation\par Language: clear and fluent, intact comprehension and repetition\par \par Cranial Nerves\par II: visual fields full to confrontation \par III, IV, VI: PERRL, slightly disconjugate upward gaze\par V, VII: facial sensation and movement intact and symmetric \par VIII: hearing intact \par IX, X: uvula midline, soft palate elevates normally \par XI: BL shoulder shrug intact \par XII: tongue midline\par \par Motor\par Shoulder abd: 5 (R), 5 (L)\par EF/EE: 5 (R), 5 (L)\par hand : 5 (R), 5 (L)\par HF/HE: 5 (R), 5 (L)\par KF/KE: 5 (R), 5 (L)\par DF/PF: 5 (R), 5 (L) \par Tone and bulk are normal in upper and lower limbs\par No pronator drift\par \par Sensation\par Intact to light touch in all 4 EXTs\par \par Reflex\par 2+ in BL biceps, brachioradialis, patellar\par \par Coordination\par Normal FTN bilaterally\par Dysdiadochokinesia not present. \par Able to perform rapid, alternating movements\par \par Gait\par Normal stance, stride, and pivot turn\par - Romberg

## 2022-11-02 NOTE — CONSULT LETTER
[Dear  ___] : Dear  [unfilled], [Courtesy Letter:] : I had the pleasure of seeing your patient, [unfilled], in my office today. [Please see my note below.] : Please see my note below. [Sincerely,] : Sincerely, [FreeTextEntry3] : Ammon Pretty MD\par Stroke, Neuroendovascular Surgery\par Regional \par Stroke Director, [facility]

## 2022-11-07 ENCOUNTER — APPOINTMENT (OUTPATIENT)
Dept: FAMILY MEDICINE | Facility: CLINIC | Age: 27
End: 2022-11-07

## 2022-11-07 VITALS
OXYGEN SATURATION: 98 % | DIASTOLIC BLOOD PRESSURE: 78 MMHG | HEIGHT: 59 IN | HEART RATE: 73 BPM | WEIGHT: 181 LBS | SYSTOLIC BLOOD PRESSURE: 114 MMHG | BODY MASS INDEX: 36.49 KG/M2 | TEMPERATURE: 98.6 F

## 2022-11-07 VITALS — DIASTOLIC BLOOD PRESSURE: 80 MMHG | SYSTOLIC BLOOD PRESSURE: 118 MMHG

## 2022-11-07 PROCEDURE — 99495 TRANSJ CARE MGMT MOD F2F 14D: CPT | Mod: 25

## 2022-11-07 PROCEDURE — 36415 COLL VENOUS BLD VENIPUNCTURE: CPT

## 2022-11-07 RX ORDER — ATORVASTATIN CALCIUM 80 MG/1
80 TABLET, FILM COATED ORAL
Qty: 30 | Refills: 0 | Status: COMPLETED | COMMUNITY
Start: 2022-10-26

## 2022-11-07 RX ORDER — ASPIRIN 81 MG/1
81 TABLET, CHEWABLE ORAL
Qty: 30 | Refills: 0 | Status: COMPLETED | COMMUNITY
Start: 2022-10-26

## 2022-11-07 RX ORDER — NEOMYCIN AND POLYMYXIN B SULFATES AND HYDROCORTISONE OTIC 10; 3.5; 1 MG/ML; MG/ML; [USP'U]/ML
3.5-10000-1 SUSPENSION AURICULAR (OTIC)
Qty: 10 | Refills: 0 | Status: COMPLETED | COMMUNITY
Start: 2022-07-22

## 2022-11-07 NOTE — PLAN
[FreeTextEntry1] : s/p Neuro consult on 11/2/22 \par cont Atorvastatin 40mg hs \par cont 81 mg ASA \par f/u 6-8 weeks \par \par Hematology f/u c Dr. Sanchez 11/17/22 \par Cardiology f/u c Dr. Adams 11/10/22 \par \par check TFTs  \par \par 3 weeks for lipid/lft/cpk/cbc check

## 2022-11-07 NOTE — PHYSICAL EXAM
[No Acute Distress] : no acute distress [Well Nourished] : well nourished [Well Developed] : well developed [Well-Appearing] : well-appearing [PERRL] : pupils equal round and reactive to light [EOMI] : extraocular movements intact [Normal Outer Ear/Nose] : the outer ears and nose were normal in appearance [No JVD] : no jugular venous distention [No Respiratory Distress] : no respiratory distress  [No Accessory Muscle Use] : no accessory muscle use [Clear to Auscultation] : lungs were clear to auscultation bilaterally [Normal Rate] : normal rate  [Regular Rhythm] : with a regular rhythm [Normal S1, S2] : normal S1 and S2 [No Carotid Bruits] : no carotid bruits [No Edema] : there was no peripheral edema [No Palpable Aorta] : no palpable aorta [No Extremity Clubbing/Cyanosis] : no extremity clubbing/cyanosis [Soft] : abdomen soft [Non Tender] : non-tender [Non-distended] : non-distended [No Masses] : no abdominal mass palpated [No HSM] : no HSM [Normal Bowel Sounds] : normal bowel sounds [No CVA Tenderness] : no CVA  tenderness [Grossly Normal Strength/Tone] : grossly normal strength/tone [No Rash] : no rash [Coordination Grossly Intact] : coordination grossly intact [No Focal Deficits] : no focal deficits [Normal Gait] : normal gait [___/1] : [unfilled]/1   [___/3] : [unfilled]/3 [___/5] : [unfilled]/5 [___/4] : [unfilled]/4 [___/2] : [unfilled]/2 [___/8] : [unfilled]/8 [Normal Affect] : the affect was normal [Normal Mood] : the mood was normal [Normal Insight/Judgement] : insight and judgment were intact [TextBox_2] : yes  [TextBox_4] : high school  [SlumsTotal] : 25

## 2022-11-07 NOTE — HISTORY OF PRESENT ILLNESS
[Post-hospitalization from ___ Hospital] : Post-hospitalization from [unfilled] Hospital [Admitted on: ___] : The patient was admitted on [unfilled] [Discharged on ___] : discharged on [unfilled] [Discharge Summary] : discharge summary [Discharge Med List] : discharge medication list [Patient Contacted By: ____] : and contacted by [unfilled] [FreeTextEntry2] : 28 yo female s/p [facility] admission 10/24/22-10/26/22 secondary to syncopal episode.   pt states passed out post vomiting episode. \par pt states  daughter  called EMS.   Transported to ED Tx c TPA for possible CVA secondary to L sided weakness, facial droop, slurred speech, and decreased sensation L sided. \par \par pt states has been feeling better,  however, reports short term memory loss, impaired recall

## 2022-11-08 DIAGNOSIS — Z87.898 PERSONAL HISTORY OF OTHER SPECIFIED CONDITIONS: ICD-10-CM

## 2022-11-10 ENCOUNTER — APPOINTMENT (OUTPATIENT)
Dept: CARDIOLOGY | Facility: CLINIC | Age: 27
End: 2022-11-10

## 2022-11-10 ENCOUNTER — NON-APPOINTMENT (OUTPATIENT)
Age: 27
End: 2022-11-10

## 2022-11-10 VITALS — SYSTOLIC BLOOD PRESSURE: 106 MMHG | DIASTOLIC BLOOD PRESSURE: 70 MMHG

## 2022-11-10 VITALS
WEIGHT: 181 LBS | TEMPERATURE: 98.6 F | OXYGEN SATURATION: 99 % | HEIGHT: 59 IN | BODY MASS INDEX: 36.49 KG/M2 | SYSTOLIC BLOOD PRESSURE: 108 MMHG | HEART RATE: 74 BPM | DIASTOLIC BLOOD PRESSURE: 72 MMHG

## 2022-11-10 DIAGNOSIS — Z82.49 FAMILY HISTORY OF ISCHEMIC HEART DISEASE AND OTHER DISEASES OF THE CIRCULATORY SYSTEM: ICD-10-CM

## 2022-11-10 DIAGNOSIS — Z78.9 OTHER SPECIFIED HEALTH STATUS: ICD-10-CM

## 2022-11-10 PROCEDURE — 93000 ELECTROCARDIOGRAM COMPLETE: CPT

## 2022-11-10 PROCEDURE — 99213 OFFICE O/P EST LOW 20 MIN: CPT | Mod: 25

## 2022-11-10 NOTE — ASSESSMENT
[FreeTextEntry1] : EKG 11/10/2022-Sinus  Rhythm  -With rate variation \par cv = 18.\par -  Negative precordial T-waves. \par \par WITHIN NORMAL LIMITS\par \par Assessment:\par 1.  CVA in October 2022-patient received tPA with no residual deficits and no evidence of stroke on MRI.  Patient's MYRON was negative for any PFO, no evidence of thrombus.\par \par Recommendations:\par \par At this point, etiology of her CVA remains unclear.\par Patient needs to be monitored for possible PAF, patient was offered 30-day event monitor and also she was informed about ILR for long-term monitoring.\par For now she is agreeable to 30-day MCOT monitoring.\par \par 1. MCOT for 30 days\par 2. F/u in 2 months

## 2022-11-10 NOTE — HISTORY OF PRESENT ILLNESS
[FreeTextEntry1] : Patient is a 27 year old female with a past medical history of  hypothyroid and PE in pregnancy (at 5 months gestation in 2016), treated with Lovenox for duration of pregnancy,  AC discontinued after delivery.  Patient presented to ED on October 24, 2022 stating she had dizziness with nausea  followed by left sided weakness and numbness and LOC.   On arrival to CTH/CTA/CTP negative. NIHSS of 4, TPA administered at 13:55.  Repeat CT head was negative. MRI of the brain negative for Stroke. TTE with bubble study was negative; normal LVSF. MYRON was negative for PFO.  . To follow up with cardiology as outpatient for MCOT. Needs to follow up with hematology for hypercoaguable work up given recent PE. Continue aspirin and statin \par \par Today patient presents for a follow-up visit.  Patient is feeling fine denies any cardiopulmonary complaints.  Patient denies palpitations.  Patient has an upcoming appointment with a hematologist for a hypercoagulability work-up.\par

## 2022-11-11 LAB
T3FREE SERPL-MCNC: 4.37 PG/ML
T4 FREE SERPL-MCNC: 2.3 NG/DL
TSH SERPL-ACNC: 0.47 UIU/ML

## 2022-11-28 ENCOUNTER — APPOINTMENT (OUTPATIENT)
Dept: FAMILY MEDICINE | Facility: CLINIC | Age: 27
End: 2022-11-28

## 2022-12-28 ENCOUNTER — APPOINTMENT (OUTPATIENT)
Dept: NEUROLOGY | Facility: CLINIC | Age: 27
End: 2022-12-28

## 2023-01-26 ENCOUNTER — APPOINTMENT (OUTPATIENT)
Dept: CARDIOLOGY | Facility: CLINIC | Age: 28
End: 2023-01-26

## 2023-02-15 ENCOUNTER — APPOINTMENT (OUTPATIENT)
Dept: FAMILY MEDICINE | Facility: CLINIC | Age: 28
End: 2023-02-15
Payer: MEDICAID

## 2023-02-15 VITALS
BODY MASS INDEX: 36.69 KG/M2 | HEIGHT: 59 IN | TEMPERATURE: 97.5 F | HEART RATE: 86 BPM | WEIGHT: 182 LBS | SYSTOLIC BLOOD PRESSURE: 100 MMHG | DIASTOLIC BLOOD PRESSURE: 64 MMHG | OXYGEN SATURATION: 99 %

## 2023-02-15 DIAGNOSIS — O92.70 UNSPECIFIED DISORDERS OF LACTATION: ICD-10-CM

## 2023-02-15 DIAGNOSIS — R73.9 HYPERGLYCEMIA, UNSPECIFIED: ICD-10-CM

## 2023-02-15 DIAGNOSIS — E88.81 METABOLIC SYNDROME: Chronic | ICD-10-CM

## 2023-02-15 PROCEDURE — 99214 OFFICE O/P EST MOD 30 MIN: CPT | Mod: 25

## 2023-02-15 NOTE — HISTORY OF PRESENT ILLNESS
[FreeTextEntry1] : ABHAY is a 27 year old female here for a follow up.  [de-identified] : 26 yo female c hx of Obesity/Hypothyroidism/Palpitation hx\par \par as above pt reports decreased frequency and decreased intensity of palpitations.   Currently being followed by Cardiology Dr. Adams.  Holter Monitor unremarkable.   Event monitor recommended, however, unable to tolerate secondary to hypersensitivity to adhesive tape x 2.  \par \par no CP/SOB c activity no dizziness \par no N/V/D +BM qod-qd no bloody/black stools \par no urinary complaints \par \par pt states has been out of LT4 175mcg qd x "couple of days" \par pt reports compliance c Atorvastatin 40mg 1/2 tab qd

## 2023-02-15 NOTE — PLAN
[FreeTextEntry1] : \par Bariatric Specialist consult scheduled on 2/16/23  c Dr. Baez  ([place])  \par \par see lab orders\par \par \par cont current meds \par \par cont'd f/u c Neurology Dr. Pretty

## 2023-02-17 NOTE — ED ADULT TRIAGE NOTE - CHIEF COMPLAINT QUOTE
HA with nausea and vomiting dizziness speech impaired resolved x 2 days, was seen at [facility] health Fluconazole Counseling:  Patient counseled regarding adverse effects of fluconazole including but not limited to headache, diarrhea, nausea, upset stomach, liver function test abnormalities, taste disturbance, and stomach pain.  There is a rare possibility of liver failure that can occur when taking fluconazole.  The patient understands that monitoring of LFTs and kidney function test may be required, especially at baseline. The patient verbalized understanding of the proper use and possible adverse effects of fluconazole.  All of the patient's questions and concerns were addressed.

## 2023-02-20 LAB
25(OH)D3 SERPL-MCNC: 15.3 NG/ML
ALBUMIN SERPL ELPH-MCNC: 4.6 G/DL
ALP BLD-CCNC: 71 U/L
ALT SERPL-CCNC: 23 U/L
ANION GAP SERPL CALC-SCNC: 14 MMOL/L
AST SERPL-CCNC: 17 U/L
BASOPHILS # BLD AUTO: 0.05 K/UL
BASOPHILS NFR BLD AUTO: 0.7 %
BILIRUB SERPL-MCNC: 0.3 MG/DL
BUN SERPL-MCNC: 8 MG/DL
CALCIUM SERPL-MCNC: 9.4 MG/DL
CHLORIDE SERPL-SCNC: 103 MMOL/L
CHOLEST SERPL-MCNC: 185 MG/DL
CK SERPL-CCNC: 70 U/L
CO2 SERPL-SCNC: 22 MMOL/L
CREAT SERPL-MCNC: 0.56 MG/DL
EGFR: 127 ML/MIN/1.73M2
EOSINOPHIL # BLD AUTO: 0.24 K/UL
EOSINOPHIL NFR BLD AUTO: 3.2 %
ESTIMATED AVERAGE GLUCOSE: 120 MG/DL
FERRITIN SERPL-MCNC: 34 NG/ML
FOLATE SERPL-MCNC: 18.8 NG/ML
GGT SERPL-CCNC: 41 U/L
GLUCOSE SERPL-MCNC: 93 MG/DL
HBA1C MFR BLD HPLC: 5.8 %
HCT VFR BLD CALC: 36.6 %
HDLC SERPL-MCNC: 40 MG/DL
HGB BLD-MCNC: 11.5 G/DL
IMM GRANULOCYTES NFR BLD AUTO: 0.4 %
IRON SATN MFR SERPL: 16 %
IRON SERPL-MCNC: 55 UG/DL
LDLC SERPL CALC-MCNC: 121 MG/DL
LYMPHOCYTES # BLD AUTO: 2.04 K/UL
LYMPHOCYTES NFR BLD AUTO: 27.2 %
MAN DIFF?: NORMAL
MCHC RBC-ENTMCNC: 26.9 PG
MCHC RBC-ENTMCNC: 31.4 GM/DL
MCV RBC AUTO: 85.7 FL
MONOCYTES # BLD AUTO: 0.61 K/UL
MONOCYTES NFR BLD AUTO: 8.1 %
NEUTROPHILS # BLD AUTO: 4.52 K/UL
NEUTROPHILS NFR BLD AUTO: 60.4 %
NONHDLC SERPL-MCNC: 145 MG/DL
PLATELET # BLD AUTO: 330 K/UL
POTASSIUM SERPL-SCNC: 4.5 MMOL/L
PROT SERPL-MCNC: 7.5 G/DL
RBC # BLD: 4.27 M/UL
RBC # FLD: 14.2 %
SODIUM SERPL-SCNC: 139 MMOL/L
T3FREE SERPL-MCNC: 2.16 PG/ML
T4 FREE SERPL-MCNC: 0.6 NG/DL
TIBC SERPL-MCNC: 346 UG/DL
TRANSFERRIN SERPL-MCNC: 275 MG/DL
TRIGL SERPL-MCNC: 116 MG/DL
TSH SERPL-ACNC: 77 UIU/ML
UIBC SERPL-MCNC: 292 UG/DL
URATE SERPL-MCNC: 4.4 MG/DL
VIT B12 SERPL-MCNC: 365 PG/ML
WBC # FLD AUTO: 7.49 K/UL

## 2023-02-23 ENCOUNTER — NON-APPOINTMENT (OUTPATIENT)
Age: 28
End: 2023-02-23

## 2023-02-23 ENCOUNTER — APPOINTMENT (OUTPATIENT)
Dept: CARDIOLOGY | Facility: CLINIC | Age: 28
End: 2023-02-23
Payer: MEDICAID

## 2023-02-23 VITALS
HEIGHT: 59 IN | HEART RATE: 92 BPM | TEMPERATURE: 98.3 F | SYSTOLIC BLOOD PRESSURE: 122 MMHG | WEIGHT: 188 LBS | DIASTOLIC BLOOD PRESSURE: 76 MMHG | OXYGEN SATURATION: 94 % | BODY MASS INDEX: 37.9 KG/M2

## 2023-02-23 PROCEDURE — 93000 ELECTROCARDIOGRAM COMPLETE: CPT | Mod: NC

## 2023-02-23 PROCEDURE — 99214 OFFICE O/P EST MOD 30 MIN: CPT | Mod: 25

## 2023-02-23 RX ORDER — ATORVASTATIN CALCIUM 40 MG/1
40 TABLET, FILM COATED ORAL
Qty: 1 | Refills: 1 | Status: DISCONTINUED | COMMUNITY
End: 2023-02-23

## 2023-03-09 ENCOUNTER — APPOINTMENT (OUTPATIENT)
Dept: CARDIOLOGY | Facility: CLINIC | Age: 28
End: 2023-03-09
Payer: MEDICAID

## 2023-03-09 PROCEDURE — 93015 CV STRESS TEST SUPVJ I&R: CPT

## 2023-03-10 ENCOUNTER — APPOINTMENT (OUTPATIENT)
Dept: CARDIOLOGY | Facility: CLINIC | Age: 28
End: 2023-03-10

## 2023-03-10 ENCOUNTER — APPOINTMENT (OUTPATIENT)
Dept: ELECTROPHYSIOLOGY | Facility: CLINIC | Age: 28
End: 2023-03-10

## 2023-03-13 ENCOUNTER — APPOINTMENT (OUTPATIENT)
Dept: ELECTROPHYSIOLOGY | Facility: CLINIC | Age: 28
End: 2023-03-13
Payer: MEDICAID

## 2023-03-13 VITALS
HEIGHT: 59 IN | OXYGEN SATURATION: 97 % | TEMPERATURE: 97.5 F | HEART RATE: 87 BPM | BODY MASS INDEX: 37.5 KG/M2 | WEIGHT: 186 LBS | SYSTOLIC BLOOD PRESSURE: 109 MMHG | DIASTOLIC BLOOD PRESSURE: 68 MMHG

## 2023-03-13 PROCEDURE — 99205 OFFICE O/P NEW HI 60 MIN: CPT | Mod: 25

## 2023-03-13 PROCEDURE — 93000 ELECTROCARDIOGRAM COMPLETE: CPT

## 2023-03-16 NOTE — PHYSICAL EXAM
[Well Developed] : well developed [Well Nourished] : well nourished [No Acute Distress] : no acute distress [Normal S1, S2] : normal S1, S2 [No Murmur] : no murmur [Clear Lung Fields] : clear lung fields [Good Air Entry] : good air entry [Soft] : abdomen soft

## 2023-03-16 NOTE — ASSESSMENT
[FreeTextEntry1] : A 26 y/o F with obesity, hypothyroidism, prior PE during pregnancy in 2016 treated by short course of AC. She presented with stroke last year and her TTE/MYRON were unremarkable. She wore a monitor for 2 weeks without AF. Her hypercoagulable workup reported to be negative. She is awaiting bariatric surgery. \par \par SHe is now referred by cardiology and neurology for ILR. \par \par I had a lengthy discussion with the pt. She is young and does not have many AF risk factors. I explained that we cannot be certain that her stroke was due to AF, but in my opinion this is less likely. We went over AF monitoring options and I advised her to consider alternative to the ILR like EKG capable Apple watch plus periodic monitoring. I explained the potential copays with ILR and the cosmetic implications. She remained very convinced to proceed with the ILR which is not unreasonable.\par \par \par Will proceed with ILR. Rest of care as by neuro/cardio and PCP.

## 2023-03-16 NOTE — HISTORY OF PRESENT ILLNESS
[FreeTextEntry1] : This is an 27 year old female with a past medical history of obesity, stroke, hypothyroid and PE in pregnancy (at 5 months gestation in 2016), treated with Lovenox for duration of pregnancy, AC discontinued after delivery. Patient presented to ED on October 24, 2022 stating she had dizziness with nausea followed by left sided weakness and numbness and LOC. On arrival to CTH/CTA/CTP negative. NIHSS of 4, TPA administered at 13:55. Repeat CT head was negative. MRI of the brain negative for Stroke. TTE with bubble study was negative; normal LVSF. MYRON was negative for PFO.\par \par She followed with neurology and cardiology. Had one month event monitor 11/2022 which did not show AF. She reports she could not finish the recording and only did 2 weeks monitoring due to reaction to the adhesive. She saw hematology for hypercoagulable workup and she reports today that her workup was unremarkable. She is planning to undergo bariatric surgery in the next couple of months\par \par She is now referred by neurology and cardiology for ILR evaluation. She tells me today that she already researched this and was very keen on it. \par \par She denies any fevers, chills, cough, sweats, chest pain, palpitations, dyspnea on exertion, orthopnea, paroxysmal nocturnal dyspnea, peripheral edema, lightheadedness, dizziness, syncope, nausea, vomiting, melena, hematochezia, or hematemesis.\par \par   Social history:\par Negative for smoking, illicit drugs or alcohol abuse.\par \par Family history:\par Negative for premature CAD or SCD. \par \par \par TESTS:\par EKG 3/13/23: NSR\par EST 3/2023: Normal\par Event monitor 11/2022: NO AF

## 2023-03-24 ENCOUNTER — APPOINTMENT (OUTPATIENT)
Dept: FAMILY MEDICINE | Facility: CLINIC | Age: 28
End: 2023-03-24
Payer: MEDICAID

## 2023-03-24 VITALS
OXYGEN SATURATION: 98 % | DIASTOLIC BLOOD PRESSURE: 70 MMHG | WEIGHT: 188 LBS | TEMPERATURE: 97.2 F | SYSTOLIC BLOOD PRESSURE: 110 MMHG | BODY MASS INDEX: 37.9 KG/M2 | HEIGHT: 59 IN | HEART RATE: 72 BPM

## 2023-03-24 DIAGNOSIS — E34.9 ENDOCRINE DISORDER, UNSPECIFIED: ICD-10-CM

## 2023-03-24 DIAGNOSIS — S93.402A SPRAIN OF UNSPECIFIED LIGAMENT OF LEFT ANKLE, INITIAL ENCOUNTER: ICD-10-CM

## 2023-03-24 DIAGNOSIS — E78.5 HYPERLIPIDEMIA, UNSPECIFIED: ICD-10-CM

## 2023-03-24 DIAGNOSIS — I63.9 CEREBRAL INFARCTION, UNSPECIFIED: ICD-10-CM

## 2023-03-24 PROCEDURE — 99215 OFFICE O/P EST HI 40 MIN: CPT | Mod: 25

## 2023-03-24 NOTE — HISTORY OF PRESENT ILLNESS
[FreeTextEntry1] : Follow up TFT [de-identified] : 28 yo female presents to office for 1M f/u on hypothyroidism  s/p restart of LT4 175mcg qd.  pt reports both tolerance and compliance c meds as Rx'ed.\par improved energy, sleep, and mood.\par decreased fatigue \par improved bowel habits \par \par pt reports compliance c recommended OTC Vit D 5KU daily

## 2023-03-24 NOTE — PLAN
[FreeTextEntry1] : see lab orders \par \par RICE method \par see med orders \par see radiology orders \par \par f/u pending lab/imaging results \par \par start OTC SL B12 1000mcg qd

## 2023-03-27 ENCOUNTER — APPOINTMENT (OUTPATIENT)
Dept: ULTRASOUND IMAGING | Facility: CLINIC | Age: 28
End: 2023-03-27
Payer: MEDICAID

## 2023-03-27 ENCOUNTER — OUTPATIENT (OUTPATIENT)
Dept: OUTPATIENT SERVICES | Facility: HOSPITAL | Age: 28
LOS: 1 days | End: 2023-03-27

## 2023-03-27 ENCOUNTER — OUTPATIENT (OUTPATIENT)
Dept: OUTPATIENT SERVICES | Facility: HOSPITAL | Age: 28
LOS: 1 days | End: 2023-03-27
Payer: MEDICAID

## 2023-03-27 DIAGNOSIS — S93.402A SPRAIN OF UNSPECIFIED LIGAMENT OF LEFT ANKLE, INITIAL ENCOUNTER: ICD-10-CM

## 2023-03-27 DIAGNOSIS — Z90.49 ACQUIRED ABSENCE OF OTHER SPECIFIED PARTS OF DIGESTIVE TRACT: Chronic | ICD-10-CM

## 2023-03-27 DIAGNOSIS — Z98.89 OTHER SPECIFIED POSTPROCEDURAL STATES: Chronic | ICD-10-CM

## 2023-03-27 DIAGNOSIS — E03.9 HYPOTHYROIDISM, UNSPECIFIED: ICD-10-CM

## 2023-03-27 DIAGNOSIS — E34.9 ENDOCRINE DISORDER, UNSPECIFIED: ICD-10-CM

## 2023-03-27 DIAGNOSIS — Z98.890 OTHER SPECIFIED POSTPROCEDURAL STATES: Chronic | ICD-10-CM

## 2023-03-27 PROCEDURE — 76882 US LMTD JT/FCL EVL NVASC XTR: CPT | Mod: 26,LT

## 2023-03-27 PROCEDURE — 76536 US EXAM OF HEAD AND NECK: CPT

## 2023-03-27 PROCEDURE — 76536 US EXAM OF HEAD AND NECK: CPT | Mod: 26

## 2023-03-28 ENCOUNTER — APPOINTMENT (OUTPATIENT)
Dept: ULTRASOUND IMAGING | Facility: CLINIC | Age: 28
End: 2023-03-28

## 2023-04-03 LAB
25(OH)D3 SERPL-MCNC: 11.7 NG/ML
CALCIUM SERPL-MCNC: 9.7 MG/DL
PARATHYROID HORMONE INTACT: 37 PG/ML
T3FREE SERPL-MCNC: 3.9 PG/ML
T4 FREE SERPL-MCNC: 2 NG/DL
TSH SERPL-ACNC: 0.29 UIU/ML

## 2023-04-04 ENCOUNTER — TRANSCRIPTION ENCOUNTER (OUTPATIENT)
Age: 28
End: 2023-04-04

## 2023-04-04 ENCOUNTER — APPOINTMENT (OUTPATIENT)
Dept: MRI IMAGING | Facility: CLINIC | Age: 28
End: 2023-04-04

## 2023-04-04 ENCOUNTER — OUTPATIENT (OUTPATIENT)
Dept: OUTPATIENT SERVICES | Facility: HOSPITAL | Age: 28
LOS: 1 days | End: 2023-04-04
Payer: COMMERCIAL

## 2023-04-04 ENCOUNTER — APPOINTMENT (OUTPATIENT)
Dept: RADIOLOGY | Facility: CLINIC | Age: 28
End: 2023-04-04

## 2023-04-04 VITALS
RESPIRATION RATE: 18 BRPM | TEMPERATURE: 98 F | SYSTOLIC BLOOD PRESSURE: 114 MMHG | DIASTOLIC BLOOD PRESSURE: 66 MMHG | HEART RATE: 91 BPM | OXYGEN SATURATION: 100 %

## 2023-04-04 DIAGNOSIS — Z98.890 OTHER SPECIFIED POSTPROCEDURAL STATES: Chronic | ICD-10-CM

## 2023-04-04 DIAGNOSIS — Z90.49 ACQUIRED ABSENCE OF OTHER SPECIFIED PARTS OF DIGESTIVE TRACT: Chronic | ICD-10-CM

## 2023-04-04 DIAGNOSIS — I48 ATRIAL FIBRILLATION AND FLUTTER: ICD-10-CM

## 2023-04-04 DIAGNOSIS — Z98.89 OTHER SPECIFIED POSTPROCEDURAL STATES: Chronic | ICD-10-CM

## 2023-04-04 PROCEDURE — C1764: CPT

## 2023-04-04 PROCEDURE — 33285 INSJ SUBQ CAR RHYTHM MNTR: CPT

## 2023-04-04 RX ORDER — CEPHALEXIN 500 MG
500 CAPSULE ORAL ONCE
Refills: 0 | Status: COMPLETED | OUTPATIENT
Start: 2023-04-04 | End: 2023-04-04

## 2023-04-04 RX ADMIN — Medication 500 MILLIGRAM(S): at 07:54

## 2023-04-04 NOTE — PROGRESS NOTE ADULT - NS ATTEND AMEND GEN_ALL_CORE FT
Seen and examined, agree with above. See my o/p notes for more details. ILR discussed. Risks and potential copays/ cosmetic effects discussed. Pt provided informed consent to proceed.

## 2023-04-04 NOTE — DISCHARGE NOTE NURSING/CASE MANAGEMENT/SOCIAL WORK - NSDPDISTO_GEN_ALL_CORE
Have Your Skin Lesions Been Treated?: not been treated Is This A New Presentation, Or A Follow-Up?: Skin Lesions How Severe Is Your Skin Lesion?: moderate Home

## 2023-04-04 NOTE — DISCHARGE NOTE NURSING/CASE MANAGEMENT/SOCIAL WORK - PATIENT PORTAL LINK FT
You can access the FollowMyHealth Patient Portal offered by [facility] by registering at the following website: [email or web address]. By joining [employer]’s FollowMyHealth portal, you will also be able to view your health information using other applications (apps) compatible with our system.

## 2023-04-04 NOTE — ASU DISCHARGE PLAN (ADULT/PEDIATRIC) - CARE PROVIDER_API CALL
Yue Chapman)  Cardiac Electrophysiology; Cardiovascular Disease; Internal Medicine  [address] [place] [id number]  Phone: [phone]  Fax: [phone]  Follow Up Time:

## 2023-04-04 NOTE — ASU DISCHARGE PLAN (ADULT/PEDIATRIC) - ASU DC SPECIAL INSTRUCTIONSFT
Loop Recorder Incision Care:     - Do not touch the incision until it is completely healed.   - There is Dermabond (skin glue) and/or Steristrips on your incision, which will start to flake off on its own over the next 2-3 weeks. Do not pick at or peel off the Dermabond and /or Steristrips.    - Do not apply soaps, creams, lotions, ointments or powders to the incision until it is completely healed.  - You should call the doctor if you notice redness, drainage, swelling, increased tenderness, hot sensation around the  incision, bleeding or incision edges pulling apart.

## 2023-04-04 NOTE — PROGRESS NOTE ADULT - SUBJECTIVE AND OBJECTIVE BOX
27 year old female with hypothyroidism, obesity, PE during pregnancy (tx w/ lovenox, negative hypercoagulable workup), syncope and stroke 2022. Her TTE/MYRON were unremarkable and 2 week MCOT did not demonstrate AFib. She presents today for elective ILR implant for long term arrhythmia monitoring.   Office note from 3/13/23 reviewed and there have been no significant changes in PMH or PSH.     - keflex 500mg PO x 1 now  - consent obtained

## 2023-04-11 ENCOUNTER — APPOINTMENT (OUTPATIENT)
Dept: PULMONOLOGY | Facility: CLINIC | Age: 28
End: 2023-04-11

## 2023-04-19 ENCOUNTER — APPOINTMENT (OUTPATIENT)
Dept: FAMILY MEDICINE | Facility: CLINIC | Age: 28
End: 2023-04-19

## 2023-04-21 ENCOUNTER — APPOINTMENT (OUTPATIENT)
Dept: ELECTROPHYSIOLOGY | Facility: CLINIC | Age: 28
End: 2023-04-21

## 2023-04-28 ENCOUNTER — APPOINTMENT (OUTPATIENT)
Dept: FAMILY MEDICINE | Facility: CLINIC | Age: 28
End: 2023-04-28

## 2023-05-02 ENCOUNTER — APPOINTMENT (OUTPATIENT)
Dept: FAMILY MEDICINE | Facility: CLINIC | Age: 28
End: 2023-05-02

## 2023-05-05 ENCOUNTER — APPOINTMENT (OUTPATIENT)
Dept: ELECTROPHYSIOLOGY | Facility: CLINIC | Age: 28
End: 2023-05-05

## 2023-05-09 ENCOUNTER — APPOINTMENT (OUTPATIENT)
Dept: ELECTROPHYSIOLOGY | Facility: CLINIC | Age: 28
End: 2023-05-09

## 2023-05-15 ENCOUNTER — EMERGENCY (EMERGENCY)
Facility: HOSPITAL | Age: 28
LOS: 1 days | Discharge: HOME | End: 2023-05-15
Attending: STUDENT IN AN ORGANIZED HEALTH CARE EDUCATION/TRAINING PROGRAM
Payer: COMMERCIAL

## 2023-05-15 VITALS
OXYGEN SATURATION: 98 % | DIASTOLIC BLOOD PRESSURE: 71 MMHG | TEMPERATURE: 99 F | HEART RATE: 81 BPM | RESPIRATION RATE: 18 BRPM | SYSTOLIC BLOOD PRESSURE: 120 MMHG

## 2023-05-15 DIAGNOSIS — Z98.890 OTHER SPECIFIED POSTPROCEDURAL STATES: Chronic | ICD-10-CM

## 2023-05-15 DIAGNOSIS — Z90.49 ACQUIRED ABSENCE OF OTHER SPECIFIED PARTS OF DIGESTIVE TRACT: Chronic | ICD-10-CM

## 2023-05-15 DIAGNOSIS — Z98.89 OTHER SPECIFIED POSTPROCEDURAL STATES: Chronic | ICD-10-CM

## 2023-05-15 LAB
ALBUMIN SERPL ELPH-MCNC: 4.3 G/DL — SIGNIFICANT CHANGE UP (ref 3.3–5.2)
ALP SERPL-CCNC: 99 U/L — SIGNIFICANT CHANGE UP (ref 40–120)
ALT FLD-CCNC: 36 U/L — HIGH
ANION GAP SERPL CALC-SCNC: 11 MMOL/L — SIGNIFICANT CHANGE UP (ref 5–17)
APTT BLD: 26.4 SEC — LOW (ref 27.5–35.5)
AST SERPL-CCNC: 23 U/L — SIGNIFICANT CHANGE UP
BASOPHILS # BLD AUTO: 0.06 K/UL — SIGNIFICANT CHANGE UP (ref 0–0.2)
BASOPHILS NFR BLD AUTO: 0.6 % — SIGNIFICANT CHANGE UP (ref 0–2)
BILIRUB SERPL-MCNC: <0.2 MG/DL — LOW (ref 0.4–2)
BUN SERPL-MCNC: 9.2 MG/DL — SIGNIFICANT CHANGE UP (ref 8–20)
CALCIUM SERPL-MCNC: 9.2 MG/DL — SIGNIFICANT CHANGE UP (ref 8.4–10.5)
CHLORIDE SERPL-SCNC: 102 MMOL/L — SIGNIFICANT CHANGE UP (ref 96–108)
CO2 SERPL-SCNC: 24 MMOL/L — SIGNIFICANT CHANGE UP (ref 22–29)
CREAT SERPL-MCNC: 0.49 MG/DL — LOW (ref 0.5–1.3)
D DIMER BLD IA.RAPID-MCNC: <150 NG/ML DDU — SIGNIFICANT CHANGE UP
EGFR: 132 ML/MIN/1.73M2 — SIGNIFICANT CHANGE UP
EOSINOPHIL # BLD AUTO: 0.07 K/UL — SIGNIFICANT CHANGE UP (ref 0–0.5)
EOSINOPHIL NFR BLD AUTO: 0.7 % — SIGNIFICANT CHANGE UP (ref 0–6)
GLUCOSE SERPL-MCNC: 121 MG/DL — HIGH (ref 70–99)
HCT VFR BLD CALC: 34.3 % — LOW (ref 34.5–45)
HGB BLD-MCNC: 11.1 G/DL — LOW (ref 11.5–15.5)
IMM GRANULOCYTES NFR BLD AUTO: 1 % — HIGH (ref 0–0.9)
INR BLD: 1.06 RATIO — SIGNIFICANT CHANGE UP (ref 0.88–1.16)
LYMPHOCYTES # BLD AUTO: 1.46 K/UL — SIGNIFICANT CHANGE UP (ref 1–3.3)
LYMPHOCYTES # BLD AUTO: 15.2 % — SIGNIFICANT CHANGE UP (ref 13–44)
MCHC RBC-ENTMCNC: 26.9 PG — LOW (ref 27–34)
MCHC RBC-ENTMCNC: 32.4 GM/DL — SIGNIFICANT CHANGE UP (ref 32–36)
MCV RBC AUTO: 83.1 FL — SIGNIFICANT CHANGE UP (ref 80–100)
MONOCYTES # BLD AUTO: 0.83 K/UL — SIGNIFICANT CHANGE UP (ref 0–0.9)
MONOCYTES NFR BLD AUTO: 8.7 % — SIGNIFICANT CHANGE UP (ref 2–14)
NEUTROPHILS # BLD AUTO: 7.06 K/UL — SIGNIFICANT CHANGE UP (ref 1.8–7.4)
NEUTROPHILS NFR BLD AUTO: 73.8 % — SIGNIFICANT CHANGE UP (ref 43–77)
PLATELET # BLD AUTO: 356 K/UL — SIGNIFICANT CHANGE UP (ref 150–400)
POTASSIUM SERPL-MCNC: 4.2 MMOL/L — SIGNIFICANT CHANGE UP (ref 3.5–5.3)
POTASSIUM SERPL-SCNC: 4.2 MMOL/L — SIGNIFICANT CHANGE UP (ref 3.5–5.3)
PROT SERPL-MCNC: 7.2 G/DL — SIGNIFICANT CHANGE UP (ref 6.6–8.7)
PROTHROM AB SERPL-ACNC: 12.3 SEC — SIGNIFICANT CHANGE UP (ref 10.5–13.4)
RBC # BLD: 4.13 M/UL — SIGNIFICANT CHANGE UP (ref 3.8–5.2)
RBC # FLD: 13.5 % — SIGNIFICANT CHANGE UP (ref 10.3–14.5)
SODIUM SERPL-SCNC: 137 MMOL/L — SIGNIFICANT CHANGE UP (ref 135–145)
TROPONIN T SERPL-MCNC: <0.01 NG/ML — SIGNIFICANT CHANGE UP (ref 0–0.06)
WBC # BLD: 9.58 K/UL — SIGNIFICANT CHANGE UP (ref 3.8–10.5)
WBC # FLD AUTO: 9.58 K/UL — SIGNIFICANT CHANGE UP (ref 3.8–10.5)

## 2023-05-15 PROCEDURE — 71045 X-RAY EXAM CHEST 1 VIEW: CPT

## 2023-05-15 PROCEDURE — 99285 EMERGENCY DEPT VISIT HI MDM: CPT | Mod: 25

## 2023-05-15 PROCEDURE — 36415 COLL VENOUS BLD VENIPUNCTURE: CPT

## 2023-05-15 PROCEDURE — 99285 EMERGENCY DEPT VISIT HI MDM: CPT

## 2023-05-15 PROCEDURE — 80053 COMPREHEN METABOLIC PANEL: CPT

## 2023-05-15 PROCEDURE — 71045 X-RAY EXAM CHEST 1 VIEW: CPT | Mod: 26

## 2023-05-15 PROCEDURE — 93010 ELECTROCARDIOGRAM REPORT: CPT

## 2023-05-15 PROCEDURE — 96375 TX/PRO/DX INJ NEW DRUG ADDON: CPT

## 2023-05-15 PROCEDURE — 96374 THER/PROPH/DIAG INJ IV PUSH: CPT

## 2023-05-15 PROCEDURE — 85610 PROTHROMBIN TIME: CPT

## 2023-05-15 PROCEDURE — 85730 THROMBOPLASTIN TIME PARTIAL: CPT

## 2023-05-15 PROCEDURE — 84484 ASSAY OF TROPONIN QUANT: CPT

## 2023-05-15 PROCEDURE — 93005 ELECTROCARDIOGRAM TRACING: CPT

## 2023-05-15 PROCEDURE — 84702 CHORIONIC GONADOTROPIN TEST: CPT

## 2023-05-15 PROCEDURE — 85379 FIBRIN DEGRADATION QUANT: CPT

## 2023-05-15 PROCEDURE — 85025 COMPLETE CBC W/AUTO DIFF WBC: CPT

## 2023-05-15 RX ORDER — KETOROLAC TROMETHAMINE 30 MG/ML
15 SYRINGE (ML) INJECTION ONCE
Refills: 0 | Status: DISCONTINUED | OUTPATIENT
Start: 2023-05-15 | End: 2023-05-15

## 2023-05-15 RX ORDER — METOCLOPRAMIDE HCL 10 MG
10 TABLET ORAL ONCE
Refills: 0 | Status: COMPLETED | OUTPATIENT
Start: 2023-05-15 | End: 2023-05-15

## 2023-05-15 RX ORDER — ONDANSETRON 8 MG/1
4 TABLET, FILM COATED ORAL ONCE
Refills: 0 | Status: COMPLETED | OUTPATIENT
Start: 2023-05-15 | End: 2023-05-15

## 2023-05-15 RX ADMIN — ONDANSETRON 4 MILLIGRAM(S): 8 TABLET, FILM COATED ORAL at 01:11

## 2023-05-15 RX ADMIN — Medication 10 MILLIGRAM(S): at 03:27

## 2023-05-15 RX ADMIN — Medication 15 MILLIGRAM(S): at 03:27

## 2023-05-15 NOTE — ED ADULT NURSE NOTE - NSFALLUNIVINTERV_ED_ALL_ED
Bed/Stretcher in lowest position, wheels locked, appropriate side rails in place/Call bell, personal items and telephone in reach/Instruct patient to call for assistance before getting out of bed/chair/stretcher/Non-slip footwear applied when patient is off stretcher/[place] to call system/Physically safe environment - no spills, clutter or unnecessary equipment/Purposeful proactive rounding/Room/bathroom lighting operational, light cord in reach

## 2023-05-15 NOTE — ED ADULT TRIAGE NOTE - CHIEF COMPLAINT QUOTE
patient states she had iron infusion earlier today at [facility] for known hx of anemia. since then has been feeling occasional chest pressure, n/v and weakness. no radiation of chest pain, no SOB or difficulty breathing noted.

## 2023-05-15 NOTE — ED PROVIDER NOTE - PATIENT PORTAL LINK FT
You can access the FollowMyHealth Patient Portal offered by [facility] by registering at the following website: [email or web address]. By joining [employer]’s FollowMyHealth portal, you will also be able to view your health information using other applications (apps) compatible with our system.

## 2023-05-15 NOTE — ED PROVIDER NOTE - OBJECTIVE STATEMENT
27-year-old female patient with past medical history of CVA, PE, loop recorder in place presents ED for chest pain today. Patient reports that she got iron transfusion today at [facility] for reported anemia.  After this patient had left-sided chest pain, dizziness, nausea, vomiting, diaphoresis.  Patient also reported palpitations during this time.  She reports that she has no known arrhythmia but has been called by her doctor a few times to discuss results of a loop recorder but has not gone.  She denies numbness, tingling, focal weakness, slurred speech, vision changes, or any other complaints.

## 2023-05-15 NOTE — ED PROVIDER NOTE - CLINICAL SUMMARY MEDICAL DECISION MAKING FREE TEXT BOX
patient with past medical history of CVA, PE, loop recorder presents to ED with chest pain.  Labs, EKG, chest x-ray ordered. patient with past medical history of CVA, PE, loop recorder presents to ED with chest pain.  Labs, EKG, chest x-ray ordered. Workup is unremarkable for any emergent process.   Patient is now feeling improved and wishes to leave.  Patient / family members have capacity. Patient/family was given full return precautions, counseled on red flag symptoms such as LOC, fever, severe pain, or focal deficits and advised to return to the ED for these reasons or any reason that was concerning to them. Patient/family was informed of all significant and incidental findings found on this workup today and all results were reviewed.   All questions were answered, advised to make close follow up with their primary care provider and specialty clinics (as applicable) to follow up with this visit and continue investigation/treatment.   Patient/family has shown adequate understanding and is agreeable to the plan.

## 2023-05-15 NOTE — ED PROVIDER NOTE - ATTENDING CONTRIBUTION TO CARE
27y F w/ hx CVA, anemia, PE (during pregnancy, not on AC); presents for chest pain. Pt reports that she had gone home after iron transfusion tonight, and was lying down when she started feeling palpitations associated with chest pressure, vomiting and dizziness. Now reports feeling better but has residual headache. Says she previously had similar symptoms when she was diagnosed with CVA; however also had facial droop and left-sided numbness at that time. On exam, pt well appearing and in no distress. Stable VS. Heart RRR, lungs CTAB, nonfocal neuro exam. No acute findings on labs including negative D-dimer. EKG unremarkable. [employer] ILR interrogated -- no recent events. Suspect possible anxiety episode. Pt agreeable to plan for discharge with outpatient cardiology f/u.

## 2023-05-18 ENCOUNTER — NON-APPOINTMENT (OUTPATIENT)
Age: 28
End: 2023-05-18

## 2023-05-18 ENCOUNTER — APPOINTMENT (OUTPATIENT)
Dept: CARDIOLOGY | Facility: CLINIC | Age: 28
End: 2023-05-18
Payer: MEDICAID

## 2023-05-18 VITALS
SYSTOLIC BLOOD PRESSURE: 105 MMHG | TEMPERATURE: 98.1 F | HEIGHT: 59 IN | WEIGHT: 184 LBS | BODY MASS INDEX: 37.09 KG/M2 | DIASTOLIC BLOOD PRESSURE: 68 MMHG | HEART RATE: 86 BPM | OXYGEN SATURATION: 97 %

## 2023-05-18 DIAGNOSIS — R07.89 OTHER CHEST PAIN: ICD-10-CM

## 2023-05-18 DIAGNOSIS — Z01.810 ENCOUNTER FOR PREPROCEDURAL CARDIOVASCULAR EXAMINATION: ICD-10-CM

## 2023-05-18 PROCEDURE — 99214 OFFICE O/P EST MOD 30 MIN: CPT | Mod: 25

## 2023-05-18 PROCEDURE — 93000 ELECTROCARDIOGRAM COMPLETE: CPT

## 2023-05-18 RX ORDER — IBUPROFEN 600 MG/1
600 TABLET, FILM COATED ORAL
Qty: 20 | Refills: 0 | Status: DISCONTINUED | COMMUNITY
Start: 2023-03-24 | End: 2023-05-18

## 2023-05-23 PROBLEM — Z01.810 PREOPERATIVE CARDIOVASCULAR EXAMINATION: Status: ACTIVE | Noted: 2023-02-23

## 2023-05-23 PROBLEM — R07.89 CHEST DISCOMFORT: Status: ACTIVE | Noted: 2020-08-20

## 2023-05-25 ENCOUNTER — NON-APPOINTMENT (OUTPATIENT)
Age: 28
End: 2023-05-25

## 2023-05-25 ENCOUNTER — APPOINTMENT (OUTPATIENT)
Dept: ELECTROPHYSIOLOGY | Facility: CLINIC | Age: 28
End: 2023-05-25
Payer: MEDICAID

## 2023-05-25 PROCEDURE — G2066: CPT

## 2023-05-25 PROCEDURE — 93298 REM INTERROG DEV EVAL SCRMS: CPT

## 2023-06-05 ENCOUNTER — APPOINTMENT (OUTPATIENT)
Dept: FAMILY MEDICINE | Facility: CLINIC | Age: 28
End: 2023-06-05
Payer: MEDICAID

## 2023-06-05 VITALS
HEIGHT: 59 IN | OXYGEN SATURATION: 98 % | HEART RATE: 86 BPM | BODY MASS INDEX: 37.09 KG/M2 | WEIGHT: 184 LBS | SYSTOLIC BLOOD PRESSURE: 122 MMHG | DIASTOLIC BLOOD PRESSURE: 80 MMHG

## 2023-06-05 DIAGNOSIS — Z01.818 ENCOUNTER FOR OTHER PREPROCEDURAL EXAMINATION: ICD-10-CM

## 2023-06-05 DIAGNOSIS — R79.89 OTHER SPECIFIED ABNORMAL FINDINGS OF BLOOD CHEMISTRY: ICD-10-CM

## 2023-06-05 PROCEDURE — 99214 OFFICE O/P EST MOD 30 MIN: CPT

## 2023-06-08 LAB
ABO + RH PNL BLD: NORMAL
ALBUMIN SERPL ELPH-MCNC: 4.8 G/DL
ALP BLD-CCNC: 95 U/L
ALT SERPL-CCNC: 47 U/L
ANION GAP SERPL CALC-SCNC: 12 MMOL/L
AST SERPL-CCNC: 21 U/L
BILIRUB SERPL-MCNC: 0.2 MG/DL
BUN SERPL-MCNC: 10 MG/DL
CALCIUM SERPL-MCNC: 9.6 MG/DL
CHLORIDE SERPL-SCNC: 104 MMOL/L
CO2 SERPL-SCNC: 24 MMOL/L
CREAT SERPL-MCNC: 0.44 MG/DL
EGFR: 134 ML/MIN/1.73M2
GLUCOSE SERPL-MCNC: 86 MG/DL
INR PPP: 1.02 RATIO
POTASSIUM SERPL-SCNC: 4.7 MMOL/L
PROT SERPL-MCNC: 7.3 G/DL
PT BLD: 11.9 SEC
SODIUM SERPL-SCNC: 139 MMOL/L

## 2023-06-08 NOTE — ASSESSMENT
[As per surgery] : as per surgery [FreeTextEntry4] : Medically optimized for proposed procedure pending lab results \par \par Addendum Note: Medically optimized for proposed procedure.

## 2023-06-08 NOTE — HISTORY OF PRESENT ILLNESS
[FreeTextEntry1] : Gastric Bypass [FreeTextEntry2] : 06/12/2023 [FreeTextEntry3] : DR. Baez [FreeTextEntry4] : 29 yo female for medical clearance.  Scheduled for Gastric sleeve procedure on 6/12/23 c Dr. Posada at [facility] \par (F) [phone] \par \par no CP/SOB c activity no dizzinesss no palpitations \par no N/V/D +BM bid no bloody/black stools \par no urinary complaints  \par \par no f/c/s  \par \par NO Hx of adverse reaction to anesthesia \par NO allergy to shellfish/latex/iodine \par NO hx of Blood Transfusion \par ABO Blood Type= A positive \par NO dentures

## 2023-06-26 ENCOUNTER — RX RENEWAL (OUTPATIENT)
Age: 28
End: 2023-06-26

## 2023-06-27 ENCOUNTER — APPOINTMENT (OUTPATIENT)
Dept: FAMILY MEDICINE | Facility: CLINIC | Age: 28
End: 2023-06-27
Payer: MEDICAID

## 2023-06-27 VITALS
BODY MASS INDEX: 35.28 KG/M2 | SYSTOLIC BLOOD PRESSURE: 110 MMHG | WEIGHT: 175 LBS | OXYGEN SATURATION: 98 % | DIASTOLIC BLOOD PRESSURE: 70 MMHG | HEART RATE: 63 BPM | TEMPERATURE: 98 F | HEIGHT: 59 IN

## 2023-06-27 DIAGNOSIS — Z98.84 BARIATRIC SURGERY STATUS: ICD-10-CM

## 2023-06-27 DIAGNOSIS — E66.9 OBESITY, UNSPECIFIED: ICD-10-CM

## 2023-06-27 DIAGNOSIS — R53.83 OTHER FATIGUE: ICD-10-CM

## 2023-06-27 PROCEDURE — 99495 TRANSJ CARE MGMT MOD F2F 14D: CPT | Mod: 25

## 2023-06-27 NOTE — PHYSICAL EXAM
[No Acute Distress] : no acute distress [Well Nourished] : well nourished [Well Developed] : well developed [Well-Appearing] : well-appearing [EOMI] : extraocular movements intact [Normal Outer Ear/Nose] : the outer ears and nose were normal in appearance [No Respiratory Distress] : no respiratory distress  [No Accessory Muscle Use] : no accessory muscle use [Clear to Auscultation] : lungs were clear to auscultation bilaterally [Normal Rate] : normal rate  [Regular Rhythm] : with a regular rhythm [Normal S1, S2] : normal S1 and S2 [No Carotid Bruits] : no carotid bruits [No Edema] : there was no peripheral edema [Soft] : abdomen soft [Non-distended] : non-distended [No Masses] : no abdominal mass palpated [No HSM] : no HSM [Normal Bowel Sounds] : normal bowel sounds [Coordination Grossly Intact] : coordination grossly intact [No Focal Deficits] : no focal deficits [Normal Gait] : normal gait [Normal Affect] : the affect was normal [Normal Mood] : the mood was normal [Normal Insight/Judgement] : insight and judgment were intact [de-identified] : mild TTP along incision site x 2 R side of abdomen  epigastric incision site and L sided incision site x 2 NT to palpation  [de-identified] : incision site x 5   c/d/i

## 2023-06-27 NOTE — HISTORY OF PRESENT ILLNESS
[Post-hospitalization from ___ Hospital] : Post-hospitalization from [unfilled] Hospital [Admitted on: ___] : The patient was admitted on [unfilled] [Discharged on ___] : discharged on [unfilled] [FreeTextEntry2] : 29 yo female s/p Gastric sleeve procedure 6/12/23     D/C'ed on 6/13/23\par \par Feels well   no f/c/s   \par no LE pain B/L  \par no SOB \par no urinary complaints  \par no N/V     +ve decreased appetite  \par Last BM yesterday normal in color,  normal in consistency,  decreased volume 2n2 decreased caloric intakte  NO bloody stools\par mild intermittent cough  c minimally productive clear/white phlegm  \par \par pt currently using a liquid daily MV as per Bariatric surgeon Dr. Baez \par \par 1st post op visit c Dr. Baez scheduled 7/6/23  \par \par pt reports good energy level

## 2023-06-29 ENCOUNTER — NON-APPOINTMENT (OUTPATIENT)
Age: 28
End: 2023-06-29

## 2023-06-29 ENCOUNTER — APPOINTMENT (OUTPATIENT)
Dept: ELECTROPHYSIOLOGY | Facility: CLINIC | Age: 28
End: 2023-06-29
Payer: MEDICAID

## 2023-06-29 PROCEDURE — G2066: CPT

## 2023-06-29 PROCEDURE — 93298 REM INTERROG DEV EVAL SCRMS: CPT

## 2023-07-05 LAB
ALBUMIN SERPL ELPH-MCNC: 4.4 G/DL
ALP BLD-CCNC: 124 U/L
ALT SERPL-CCNC: 109 U/L
ANION GAP SERPL CALC-SCNC: 12 MMOL/L
AST SERPL-CCNC: 47 U/L
BILIRUB SERPL-MCNC: 0.2 MG/DL
BUN SERPL-MCNC: 9 MG/DL
CALCIUM SERPL-MCNC: 9.7 MG/DL
CHLORIDE SERPL-SCNC: 108 MMOL/L
CO2 SERPL-SCNC: 22 MMOL/L
CREAT SERPL-MCNC: 0.46 MG/DL
EGFR: 133 ML/MIN/1.73M2
GLUCOSE SERPL-MCNC: 95 MG/DL
POTASSIUM SERPL-SCNC: 4.6 MMOL/L
PROT SERPL-MCNC: 6.9 G/DL
SODIUM SERPL-SCNC: 142 MMOL/L

## 2023-08-04 ENCOUNTER — APPOINTMENT (OUTPATIENT)
Dept: ELECTROPHYSIOLOGY | Facility: CLINIC | Age: 28
End: 2023-08-04
Payer: MEDICAID

## 2023-08-04 ENCOUNTER — NON-APPOINTMENT (OUTPATIENT)
Age: 28
End: 2023-08-04

## 2023-08-04 PROCEDURE — 93298 REM INTERROG DEV EVAL SCRMS: CPT

## 2023-08-04 PROCEDURE — G2066: CPT

## 2023-08-24 ENCOUNTER — APPOINTMENT (OUTPATIENT)
Dept: CARDIOLOGY | Facility: CLINIC | Age: 28
End: 2023-08-24

## 2023-09-08 ENCOUNTER — APPOINTMENT (OUTPATIENT)
Dept: ELECTROPHYSIOLOGY | Facility: CLINIC | Age: 28
End: 2023-09-08
Payer: MEDICAID

## 2023-09-08 ENCOUNTER — NON-APPOINTMENT (OUTPATIENT)
Age: 28
End: 2023-09-08

## 2023-09-08 PROCEDURE — G2066: CPT

## 2023-09-08 PROCEDURE — 93298 REM INTERROG DEV EVAL SCRMS: CPT

## 2023-10-06 ENCOUNTER — APPOINTMENT (OUTPATIENT)
Dept: FAMILY MEDICINE | Facility: CLINIC | Age: 28
End: 2023-10-06

## 2023-10-12 ENCOUNTER — APPOINTMENT (OUTPATIENT)
Dept: ORTHOPEDIC SURGERY | Facility: CLINIC | Age: 28
End: 2023-10-12
Payer: MEDICAID

## 2023-10-12 DIAGNOSIS — S83.103A UNSPECIFIED SUBLUXATION OF UNSPECIFIED KNEE, INITIAL ENCOUNTER: ICD-10-CM

## 2023-10-12 PROCEDURE — 99214 OFFICE O/P EST MOD 30 MIN: CPT | Mod: 25

## 2023-10-12 PROCEDURE — 73564 X-RAY EXAM KNEE 4 OR MORE: CPT | Mod: LT

## 2023-10-13 ENCOUNTER — NON-APPOINTMENT (OUTPATIENT)
Age: 28
End: 2023-10-13

## 2023-10-13 ENCOUNTER — APPOINTMENT (OUTPATIENT)
Dept: ELECTROPHYSIOLOGY | Facility: CLINIC | Age: 28
End: 2023-10-13
Payer: MEDICAID

## 2023-10-13 PROCEDURE — G2066: CPT

## 2023-10-13 PROCEDURE — 93298 REM INTERROG DEV EVAL SCRMS: CPT

## 2023-10-18 ENCOUNTER — OUTPATIENT (OUTPATIENT)
Dept: OUTPATIENT SERVICES | Facility: HOSPITAL | Age: 28
LOS: 1 days | End: 2023-10-18

## 2023-10-18 ENCOUNTER — APPOINTMENT (OUTPATIENT)
Dept: MRI IMAGING | Facility: CLINIC | Age: 28
End: 2023-10-18

## 2023-10-18 DIAGNOSIS — Z98.890 OTHER SPECIFIED POSTPROCEDURAL STATES: Chronic | ICD-10-CM

## 2023-10-18 DIAGNOSIS — Z00.8 ENCOUNTER FOR OTHER GENERAL EXAMINATION: ICD-10-CM

## 2023-10-18 DIAGNOSIS — Z90.49 ACQUIRED ABSENCE OF OTHER SPECIFIED PARTS OF DIGESTIVE TRACT: Chronic | ICD-10-CM

## 2023-10-18 DIAGNOSIS — Z98.89 OTHER SPECIFIED POSTPROCEDURAL STATES: Chronic | ICD-10-CM

## 2023-11-17 ENCOUNTER — NON-APPOINTMENT (OUTPATIENT)
Age: 28
End: 2023-11-17

## 2023-11-17 ENCOUNTER — APPOINTMENT (OUTPATIENT)
Dept: ELECTROPHYSIOLOGY | Facility: CLINIC | Age: 28
End: 2023-11-17
Payer: MEDICAID

## 2023-11-17 PROCEDURE — 93298 REM INTERROG DEV EVAL SCRMS: CPT

## 2023-11-17 PROCEDURE — G2066: CPT

## 2023-12-22 ENCOUNTER — NON-APPOINTMENT (OUTPATIENT)
Age: 28
End: 2023-12-22

## 2023-12-22 ENCOUNTER — APPOINTMENT (OUTPATIENT)
Dept: ELECTROPHYSIOLOGY | Facility: CLINIC | Age: 28
End: 2023-12-22
Payer: MEDICAID

## 2023-12-22 PROCEDURE — G2066: CPT

## 2023-12-22 PROCEDURE — 93298 REM INTERROG DEV EVAL SCRMS: CPT

## 2024-01-12 ENCOUNTER — NON-APPOINTMENT (OUTPATIENT)
Age: 29
End: 2024-01-12

## 2024-01-25 ENCOUNTER — NON-APPOINTMENT (OUTPATIENT)
Age: 29
End: 2024-01-25

## 2024-01-25 ENCOUNTER — APPOINTMENT (OUTPATIENT)
Dept: CARDIOLOGY | Facility: CLINIC | Age: 29
End: 2024-01-25
Payer: MEDICAID

## 2024-01-25 VITALS — HEIGHT: 59 IN | BODY MASS INDEX: 30.04 KG/M2 | WEIGHT: 149 LBS

## 2024-01-25 VITALS — OXYGEN SATURATION: 100 % | HEART RATE: 72 BPM | SYSTOLIC BLOOD PRESSURE: 109 MMHG | DIASTOLIC BLOOD PRESSURE: 76 MMHG

## 2024-01-25 DIAGNOSIS — G45.9 TRANSIENT CEREBRAL ISCHEMIC ATTACK, UNSPECIFIED: ICD-10-CM

## 2024-01-25 DIAGNOSIS — Z36.9 ENCOUNTER FOR ANTENATAL SCREENING, UNSPECIFIED: ICD-10-CM

## 2024-01-25 DIAGNOSIS — Z29.9 ENCOUNTER FOR PROPHYLACTIC MEASURES, UNSPECIFIED: ICD-10-CM

## 2024-01-25 PROCEDURE — G2211 COMPLEX E/M VISIT ADD ON: CPT

## 2024-01-25 PROCEDURE — 93000 ELECTROCARDIOGRAM COMPLETE: CPT

## 2024-01-25 PROCEDURE — 99215 OFFICE O/P EST HI 40 MIN: CPT | Mod: 25

## 2024-01-25 RX ORDER — ERGOCALCIFEROL 1.25 MG/1
1.25 MG CAPSULE ORAL
Qty: 12 | Refills: 0 | Status: DISCONTINUED | COMMUNITY
Start: 2023-04-05 | End: 2024-01-25

## 2024-01-25 RX ORDER — ENOXAPARIN SODIUM 40 MG/.4ML
40 INJECTION, SOLUTION SUBCUTANEOUS
Refills: 0 | Status: ACTIVE | COMMUNITY
Start: 2024-01-25

## 2024-01-25 NOTE — PHYSICAL EXAM
[Well Developed] : well developed [Well Nourished] : well nourished [No Acute Distress] : no acute distress [Normal Conjunctiva] : normal conjunctiva [Normal Venous Pressure] : normal venous pressure [No Carotid Bruit] : no carotid bruit [No Murmur] : no murmur [Normal S1, S2] : normal S1, S2 [No Rub] : no rub [No Gallop] : no gallop [Clear Lung Fields] : clear lung fields [Good Air Entry] : good air entry [Soft] : abdomen soft [No Respiratory Distress] : no respiratory distress  [Non Tender] : non-tender [No Masses/organomegaly] : no masses/organomegaly [Normal Bowel Sounds] : normal bowel sounds [Normal Gait] : normal gait [No Edema] : no edema [No Cyanosis] : no cyanosis [No Clubbing] : no clubbing [No Varicosities] : no varicosities [No Rash] : no rash [No Skin Lesions] : no skin lesions [Moves all extremities] : moves all extremities [No Focal Deficits] : no focal deficits [Normal Speech] : normal speech [Alert and Oriented] : alert and oriented [Normal memory] : normal memory

## 2024-01-25 NOTE — CARDIOLOGY SUMMARY
[de-identified] : 2/23/2023: Sinus  Rhythm  -  Negative precordial T-waves.   1/25/24- Sinus 61, normal axis, T-wave V1-V3, QTc 382 [de-identified] : 3/9/2023: Exercise Stress Test - 7:30 mins, 93% of MPHR, no ischemic changes

## 2024-01-25 NOTE — HISTORY OF PRESENT ILLNESS
[FreeTextEntry1] : 28F h/o hypothyroid, PE on prior pregnancy (at months gestation in 2016 was on LMWH), acute neurological deficit/TIA? in 10/2022 received IV thrombolytic MRI negative for CVA, MYRON negative for PFO, hypercoagulable workup negative, ILR implanted 4/2023 since no event, bariatric surgery 6/2023 at [facility] last seen 5/2023, now 1st trimester pregnancy presents for cardiology follow up.   Patient presents with his toddler son for the visit.  Reports feeling well, seen by hematologist and restarting on LMWH use but reportedly at lower once daily dose, she has not picked up the Rx yet. Has rare palpitations just for few seconds, no dizziness/syncope. Lost about 40 lb since the weight loss surgery. Denies preeclampsia on the prior pregnancy, was on ASA for the last pregnancy as well with full dose LMWH. This is her unplanned pregnancy.  LMP 12/7/23 probably will deliver at [facility], seeing OB next week, no MFM appointment yet.   Grandfather CAD/MI decreased age 60s Works as  from home.

## 2024-01-25 NOTE — DISCUSSION/SUMMARY
[FreeTextEntry1] : 28F h/o hypothyroid, PE on prior pregnancy (at months gestation in 2016 was on LMWH), acute neurological deficit/TIA? in 10/2022 received IV thrombolytic MRI negative for CVA, MYRON negative for PFO, hypercoagulable workup negative, ILR implanted 4/2023 since no event, bariatric surgery 6/2023 at [facility] last seen 5/2023, now 1st trimester pregnancy presents for cardiology follow up.   No cardiac complains, unclear etiology of prior PE and TIA episode with negative workup, need to restart ASA use after 1st trimester and if currently on prophylactic LMWH need to monitor if recurrence of VTE symptoms. Follow up with OB and need MFM eval. for high risk pregnancy. No prior preeclampsia and remains normotensive.    Follow up in 7-8 months prior to her due date.  [EKG obtained to assist in diagnosis and management of assessed problem(s)] : EKG obtained to assist in diagnosis and management of assessed problem(s)

## 2024-01-26 ENCOUNTER — APPOINTMENT (OUTPATIENT)
Dept: ELECTROPHYSIOLOGY | Facility: CLINIC | Age: 29
End: 2024-01-26
Payer: MEDICAID

## 2024-01-26 ENCOUNTER — NON-APPOINTMENT (OUTPATIENT)
Age: 29
End: 2024-01-26

## 2024-01-26 PROCEDURE — 93298 REM INTERROG DEV EVAL SCRMS: CPT

## 2024-02-05 ENCOUNTER — APPOINTMENT (OUTPATIENT)
Dept: FAMILY MEDICINE | Facility: CLINIC | Age: 29
End: 2024-02-05
Payer: MEDICAID

## 2024-02-05 VITALS
WEIGHT: 150 LBS | DIASTOLIC BLOOD PRESSURE: 78 MMHG | BODY MASS INDEX: 30.24 KG/M2 | SYSTOLIC BLOOD PRESSURE: 110 MMHG | HEART RATE: 62 BPM | HEIGHT: 59 IN | OXYGEN SATURATION: 98 %

## 2024-02-05 VITALS — SYSTOLIC BLOOD PRESSURE: 92 MMHG | DIASTOLIC BLOOD PRESSURE: 55 MMHG

## 2024-02-05 DIAGNOSIS — I63.9 CEREBRAL INFARCTION, UNSPECIFIED: ICD-10-CM

## 2024-02-05 DIAGNOSIS — E53.8 DEFICIENCY OF OTHER SPECIFIED B GROUP VITAMINS: ICD-10-CM

## 2024-02-05 DIAGNOSIS — E55.9 VITAMIN D DEFICIENCY, UNSPECIFIED: ICD-10-CM

## 2024-02-05 DIAGNOSIS — E03.9 HYPOTHYROIDISM, UNSPECIFIED: ICD-10-CM

## 2024-02-05 DIAGNOSIS — Z3A.09 9 WEEKS GESTATION OF PREGNANCY: ICD-10-CM

## 2024-02-05 DIAGNOSIS — D64.9 ANEMIA, UNSPECIFIED: ICD-10-CM

## 2024-02-05 PROCEDURE — 99214 OFFICE O/P EST MOD 30 MIN: CPT | Mod: 25

## 2024-02-05 NOTE — HISTORY OF PRESENT ILLNESS
[FreeTextEntry1] : follow up [de-identified] : 27 yo female currently 9 weeks  pregnant, LMP 12/7/23    s/p  OB  f/u c Dr. Posey on 1/29/24 and referred to [facility].  Initial visit scheduled 2/7/24.     +ve fatigue,   +ve vomiting  daily    pt states unable to tolerate PO fluids

## 2024-02-06 LAB
25(OH)D3 SERPL-MCNC: 20 NG/ML
ALBUMIN SERPL ELPH-MCNC: 4.4 G/DL
ALP BLD-CCNC: 69 U/L
ALT SERPL-CCNC: 13 U/L
ANION GAP SERPL CALC-SCNC: 13 MMOL/L
APPEARANCE: CLEAR
AST SERPL-CCNC: 11 U/L
BACTERIA: ABNORMAL /HPF
BASOPHILS # BLD AUTO: 0.02 K/UL
BASOPHILS NFR BLD AUTO: 0.3 %
BILIRUB SERPL-MCNC: 0.3 MG/DL
BILIRUBIN URINE: NEGATIVE
BLOOD URINE: NEGATIVE
BUN SERPL-MCNC: 6 MG/DL
CALCIUM OXALATE CRYSTALS: PRESENT
CALCIUM SERPL-MCNC: 9.1 MG/DL
CAST: 1 /LPF
CHLORIDE SERPL-SCNC: 102 MMOL/L
CHOLEST SERPL-MCNC: 162 MG/DL
CO2 SERPL-SCNC: 23 MMOL/L
COLOR: YELLOW
CREAT SERPL-MCNC: 0.42 MG/DL
EGFR: 136 ML/MIN/1.73M2
EOSINOPHIL # BLD AUTO: 0.15 K/UL
EOSINOPHIL NFR BLD AUTO: 2.1 %
EPITHELIAL CELLS: 13 /HPF
ESTIMATED AVERAGE GLUCOSE: 105 MG/DL
FERRITIN SERPL-MCNC: 358 NG/ML
FOLATE SERPL-MCNC: 15.8 NG/ML
GGT SERPL-CCNC: 36 U/L
GLUCOSE QUALITATIVE U: NEGATIVE MG/DL
GLUCOSE SERPL-MCNC: 95 MG/DL
HBA1C MFR BLD HPLC: 5.3 %
HCT VFR BLD CALC: 34.8 %
HDLC SERPL-MCNC: 54 MG/DL
HGB BLD-MCNC: 11.2 G/DL
IMM GRANULOCYTES NFR BLD AUTO: 0.4 %
IRON SATN MFR SERPL: 43 %
IRON SERPL-MCNC: 100 UG/DL
KETONES URINE: NEGATIVE MG/DL
LDLC SERPL CALC-MCNC: 91 MG/DL
LEUKOCYTE ESTERASE URINE: NEGATIVE
LYMPHOCYTES # BLD AUTO: 1.75 K/UL
LYMPHOCYTES NFR BLD AUTO: 24.3 %
MAN DIFF?: NORMAL
MCHC RBC-ENTMCNC: 28.4 PG
MCHC RBC-ENTMCNC: 32.2 GM/DL
MCV RBC AUTO: 88.1 FL
MICROSCOPIC-UA: NORMAL
MONOCYTES # BLD AUTO: 0.59 K/UL
MONOCYTES NFR BLD AUTO: 8.2 %
NEUTROPHILS # BLD AUTO: 4.66 K/UL
NEUTROPHILS NFR BLD AUTO: 64.7 %
NITRITE URINE: NEGATIVE
NONHDLC SERPL-MCNC: 107 MG/DL
PH URINE: 6.5
PLATELET # BLD AUTO: 235 K/UL
POTASSIUM SERPL-SCNC: 4.5 MMOL/L
PROT SERPL-MCNC: 6.4 G/DL
PROTEIN URINE: NEGATIVE MG/DL
RBC # BLD: 3.95 M/UL
RBC # FLD: 13.5 %
RED BLOOD CELLS URINE: NORMAL /HPF
REVIEW: NORMAL
SODIUM SERPL-SCNC: 138 MMOL/L
SPECIFIC GRAVITY URINE: 1.03
T3FREE SERPL-MCNC: 3.6 PG/ML
T4 FREE SERPL-MCNC: 2.5 NG/DL
TIBC SERPL-MCNC: 233 UG/DL
TRANSFERRIN SERPL-MCNC: 188 MG/DL
TRIGL SERPL-MCNC: 90 MG/DL
TSH SERPL-ACNC: 0.32 UIU/ML
UIBC SERPL-MCNC: 133 UG/DL
URATE SERPL-MCNC: 3.2 MG/DL
UROBILINOGEN URINE: 1 MG/DL
VIT B12 SERPL-MCNC: 307 PG/ML
WBC # FLD AUTO: 7.2 K/UL
WHITE BLOOD CELLS URINE: 0 /HPF

## 2024-02-29 ENCOUNTER — NON-APPOINTMENT (OUTPATIENT)
Age: 29
End: 2024-02-29

## 2024-03-01 ENCOUNTER — APPOINTMENT (OUTPATIENT)
Dept: ELECTROPHYSIOLOGY | Facility: CLINIC | Age: 29
End: 2024-03-01
Payer: MEDICAID

## 2024-03-01 PROCEDURE — 93298 REM INTERROG DEV EVAL SCRMS: CPT

## 2024-03-05 RX ORDER — LEVOTHYROXINE SODIUM 0.15 MG/1
150 TABLET ORAL DAILY
Qty: 90 | Refills: 1 | Status: ACTIVE | COMMUNITY
Start: 2023-02-15 | End: 1900-01-01

## 2024-04-04 ENCOUNTER — NON-APPOINTMENT (OUTPATIENT)
Age: 29
End: 2024-04-04

## 2024-04-05 ENCOUNTER — APPOINTMENT (OUTPATIENT)
Dept: ELECTROPHYSIOLOGY | Facility: CLINIC | Age: 29
End: 2024-04-05
Payer: SELF-PAY

## 2024-04-05 PROCEDURE — 93298 REM INTERROG DEV EVAL SCRMS: CPT

## 2024-04-19 ENCOUNTER — NON-APPOINTMENT (OUTPATIENT)
Age: 29
End: 2024-04-19

## 2024-05-09 ENCOUNTER — NON-APPOINTMENT (OUTPATIENT)
Age: 29
End: 2024-05-09

## 2024-05-10 ENCOUNTER — APPOINTMENT (OUTPATIENT)
Dept: ELECTROPHYSIOLOGY | Facility: CLINIC | Age: 29
End: 2024-05-10
Payer: MEDICAID

## 2024-05-10 PROCEDURE — 93298 REM INTERROG DEV EVAL SCRMS: CPT

## 2024-06-13 ENCOUNTER — NON-APPOINTMENT (OUTPATIENT)
Age: 29
End: 2024-06-13

## 2024-06-14 ENCOUNTER — APPOINTMENT (OUTPATIENT)
Dept: ELECTROPHYSIOLOGY | Facility: CLINIC | Age: 29
End: 2024-06-14
Payer: MEDICAID

## 2024-06-14 PROCEDURE — 93298 REM INTERROG DEV EVAL SCRMS: CPT

## 2024-07-19 ENCOUNTER — APPOINTMENT (OUTPATIENT)
Dept: ELECTROPHYSIOLOGY | Facility: CLINIC | Age: 29
End: 2024-07-19
Payer: MEDICAID

## 2024-07-19 ENCOUNTER — NON-APPOINTMENT (OUTPATIENT)
Age: 29
End: 2024-07-19

## 2024-07-19 PROCEDURE — 93298 REM INTERROG DEV EVAL SCRMS: CPT

## 2024-08-21 ENCOUNTER — NON-APPOINTMENT (OUTPATIENT)
Age: 29
End: 2024-08-21

## 2024-08-22 ENCOUNTER — APPOINTMENT (OUTPATIENT)
Dept: ELECTROPHYSIOLOGY | Facility: CLINIC | Age: 29
End: 2024-08-22
Payer: MEDICAID

## 2024-08-22 PROCEDURE — 93298 REM INTERROG DEV EVAL SCRMS: CPT

## 2024-09-26 ENCOUNTER — APPOINTMENT (OUTPATIENT)
Dept: ELECTROPHYSIOLOGY | Facility: CLINIC | Age: 29
End: 2024-09-26

## 2024-09-26 PROCEDURE — 93298 REM INTERROG DEV EVAL SCRMS: CPT

## 2024-09-30 ENCOUNTER — APPOINTMENT (OUTPATIENT)
Dept: CARDIOLOGY | Facility: CLINIC | Age: 29
End: 2024-09-30

## 2024-10-01 ENCOUNTER — APPOINTMENT (OUTPATIENT)
Dept: FAMILY MEDICINE | Facility: CLINIC | Age: 29
End: 2024-10-01
Payer: MEDICAID

## 2024-10-01 VITALS
WEIGHT: 134 LBS | DIASTOLIC BLOOD PRESSURE: 62 MMHG | SYSTOLIC BLOOD PRESSURE: 106 MMHG | OXYGEN SATURATION: 98 % | HEART RATE: 78 BPM | BODY MASS INDEX: 27.01 KG/M2 | HEIGHT: 59 IN

## 2024-10-01 DIAGNOSIS — E03.9 HYPOTHYROIDISM, UNSPECIFIED: ICD-10-CM

## 2024-10-01 DIAGNOSIS — E53.8 DEFICIENCY OF OTHER SPECIFIED B GROUP VITAMINS: ICD-10-CM

## 2024-10-01 PROCEDURE — 99214 OFFICE O/P EST MOD 30 MIN: CPT | Mod: 25

## 2024-10-01 NOTE — HISTORY OF PRESENT ILLNESS
[FreeTextEntry1] : follow up to check thyroid level [de-identified] : 28 yo female s/p  on 24 c hx of B12 def and Hypothyroidism presents for f/u .  pt feels well Reports fatigue c new born and 4 children total  +ve nursing    no f/c/s  no CP/SOB  no LE swelling B/L  no urinary complaints

## 2024-10-01 NOTE — PHYSICAL EXAM
[No Acute Distress] : no acute distress [EOMI] : extraocular movements intact [Normal Outer Ear/Nose] : the outer ears and nose were normal in appearance [No JVD] : no jugular venous distention [No Respiratory Distress] : no respiratory distress  [No Accessory Muscle Use] : no accessory muscle use [Clear to Auscultation] : lungs were clear to auscultation bilaterally [Normal Rate] : normal rate  [Normal S1, S2] : normal S1 and S2 [Regular Rhythm] : with a regular rhythm [No Edema] : there was no peripheral edema [Non-distended] : non-distended [No CVA Tenderness] : no CVA  tenderness [Grossly Normal Strength/Tone] : grossly normal strength/tone [Coordination Grossly Intact] : coordination grossly intact [Speech Grossly Normal] : speech grossly normal [Normal Affect] : the affect was normal [Normal Mood] : the mood was normal [Normal Insight/Judgement] : insight and judgment were intact

## 2024-10-03 LAB
ALBUMIN SERPL ELPH-MCNC: 4.3 G/DL
ALP BLD-CCNC: 85 U/L
ALT SERPL-CCNC: 11 U/L
ANION GAP SERPL CALC-SCNC: 13 MMOL/L
AST SERPL-CCNC: 12 U/L
BASOPHILS # BLD AUTO: 0.02 K/UL
BASOPHILS NFR BLD AUTO: 0.4 %
BILIRUB SERPL-MCNC: 0.3 MG/DL
BUN SERPL-MCNC: 8 MG/DL
CALCIUM SERPL-MCNC: 9.4 MG/DL
CHLORIDE SERPL-SCNC: 104 MMOL/L
CO2 SERPL-SCNC: 27 MMOL/L
CREAT SERPL-MCNC: 0.45 MG/DL
EGFR: 133 ML/MIN/1.73M2
EOSINOPHIL # BLD AUTO: 0.2 K/UL
EOSINOPHIL NFR BLD AUTO: 3.8 %
GLUCOSE SERPL-MCNC: 80 MG/DL
HCT VFR BLD CALC: 38.2 %
HGB BLD-MCNC: 12.4 G/DL
IMM GRANULOCYTES NFR BLD AUTO: 0.2 %
LYMPHOCYTES # BLD AUTO: 0.96 K/UL
LYMPHOCYTES NFR BLD AUTO: 18.3 %
MAN DIFF?: NORMAL
MCHC RBC-ENTMCNC: 28.5 PG
MCHC RBC-ENTMCNC: 32.5 GM/DL
MCV RBC AUTO: 87.8 FL
MONOCYTES # BLD AUTO: 0.59 K/UL
MONOCYTES NFR BLD AUTO: 11.3 %
NEUTROPHILS # BLD AUTO: 3.46 K/UL
NEUTROPHILS NFR BLD AUTO: 66 %
PLATELET # BLD AUTO: 181 K/UL
POTASSIUM SERPL-SCNC: 4.7 MMOL/L
PROT SERPL-MCNC: 6.8 G/DL
RBC # BLD: 4.35 M/UL
RBC # FLD: 12.8 %
SODIUM SERPL-SCNC: 144 MMOL/L
T3FREE SERPL-MCNC: 5.89 PG/ML
T4 FREE SERPL-MCNC: 2.5 NG/DL
TSH SERPL-ACNC: 0.05 UIU/ML
VIT B12 SERPL-MCNC: 563 PG/ML
WBC # FLD AUTO: 5.24 K/UL

## 2024-10-17 NOTE — REVIEW OF SYSTEMS

## 2024-10-31 ENCOUNTER — NON-APPOINTMENT (OUTPATIENT)
Age: 29
End: 2024-10-31

## 2024-10-31 ENCOUNTER — APPOINTMENT (OUTPATIENT)
Dept: ELECTROPHYSIOLOGY | Facility: CLINIC | Age: 29
End: 2024-10-31
Payer: MEDICAID

## 2024-10-31 PROCEDURE — 93298 REM INTERROG DEV EVAL SCRMS: CPT

## 2024-11-08 ENCOUNTER — NON-APPOINTMENT (OUTPATIENT)
Age: 29
End: 2024-11-08

## 2024-11-08 ENCOUNTER — APPOINTMENT (OUTPATIENT)
Dept: OPHTHALMOLOGY | Facility: CLINIC | Age: 29
End: 2024-11-08
Payer: COMMERCIAL

## 2024-11-08 PROCEDURE — 92015 DETERMINE REFRACTIVE STATE: CPT

## 2024-11-08 PROCEDURE — 92004 COMPRE OPH EXAM NEW PT 1/>: CPT

## 2024-11-12 ENCOUNTER — NON-APPOINTMENT (OUTPATIENT)
Age: 29
End: 2024-11-12

## 2024-11-12 ENCOUNTER — APPOINTMENT (OUTPATIENT)
Dept: FAMILY MEDICINE | Facility: CLINIC | Age: 29
End: 2024-11-12
Payer: COMMERCIAL

## 2024-11-12 VITALS
DIASTOLIC BLOOD PRESSURE: 70 MMHG | WEIGHT: 139 LBS | HEART RATE: 74 BPM | HEIGHT: 59 IN | BODY MASS INDEX: 28.02 KG/M2 | SYSTOLIC BLOOD PRESSURE: 110 MMHG | OXYGEN SATURATION: 98 %
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
## 2024-11-12 DIAGNOSIS — R10.812 LEFT UPPER QUADRANT ABDOMINAL TENDERNESS: ICD-10-CM

## 2024-11-12 DIAGNOSIS — M54.2 CERVICALGIA: ICD-10-CM

## 2024-11-12 DIAGNOSIS — S20.219A CONTUSION OF UNSPECIFIED FRONT WALL OF THORAX, INITIAL ENCOUNTER: ICD-10-CM

## 2024-11-12 DIAGNOSIS — M54.50 LOW BACK PAIN, UNSPECIFIED: ICD-10-CM

## 2024-11-12 DIAGNOSIS — M54.9 DORSALGIA, UNSPECIFIED: ICD-10-CM

## 2024-11-12 PROCEDURE — 99205 OFFICE O/P NEW HI 60 MIN: CPT

## 2024-11-12 PROCEDURE — 93000 ELECTROCARDIOGRAM COMPLETE: CPT

## 2024-11-12 RX ORDER — MELOXICAM 15 MG/1
15 TABLET ORAL DAILY
Qty: 14 | Refills: 0 | Status: ACTIVE | COMMUNITY
Start: 2024-11-12 | End: 1900-01-01

## 2024-12-05 ENCOUNTER — NON-APPOINTMENT (OUTPATIENT)
Age: 29
End: 2024-12-05

## 2024-12-05 ENCOUNTER — APPOINTMENT (OUTPATIENT)
Dept: ELECTROPHYSIOLOGY | Facility: CLINIC | Age: 29
End: 2024-12-05
Payer: MEDICAID

## 2024-12-05 PROCEDURE — 93298 REM INTERROG DEV EVAL SCRMS: CPT

## 2025-01-09 ENCOUNTER — NON-APPOINTMENT (OUTPATIENT)
Age: 30
End: 2025-01-09

## 2025-01-09 ENCOUNTER — APPOINTMENT (OUTPATIENT)
Dept: ELECTROPHYSIOLOGY | Facility: CLINIC | Age: 30
End: 2025-01-09
Payer: MEDICAID

## 2025-01-09 PROCEDURE — 93298 REM INTERROG DEV EVAL SCRMS: CPT

## 2025-01-19 ENCOUNTER — EMERGENCY (EMERGENCY)
Facility: HOSPITAL | Age: 30
LOS: 1 days | Discharge: HOME | End: 2025-01-19
Attending: EMERGENCY MEDICINE
Payer: COMMERCIAL

## 2025-01-19 VITALS
HEIGHT: 59 IN | TEMPERATURE: 98 F | WEIGHT: 139.99 LBS | RESPIRATION RATE: 18 BRPM | OXYGEN SATURATION: 98 % | HEART RATE: 80 BPM | DIASTOLIC BLOOD PRESSURE: 76 MMHG | SYSTOLIC BLOOD PRESSURE: 110 MMHG

## 2025-01-19 DIAGNOSIS — Z98.890 OTHER SPECIFIED POSTPROCEDURAL STATES: Chronic | ICD-10-CM

## 2025-01-19 DIAGNOSIS — Z90.49 ACQUIRED ABSENCE OF OTHER SPECIFIED PARTS OF DIGESTIVE TRACT: Chronic | ICD-10-CM

## 2025-01-19 DIAGNOSIS — Z98.89 OTHER SPECIFIED POSTPROCEDURAL STATES: Chronic | ICD-10-CM

## 2025-01-19 LAB
ALBUMIN SERPL ELPH-MCNC: 3.8 G/DL — SIGNIFICANT CHANGE UP (ref 3.3–5.2)
ALP SERPL-CCNC: 113 U/L — SIGNIFICANT CHANGE UP (ref 40–120)
ALT FLD-CCNC: 166 U/L — HIGH
ANION GAP SERPL CALC-SCNC: 14 MMOL/L — SIGNIFICANT CHANGE UP (ref 5–17)
APPEARANCE UR: CLEAR — SIGNIFICANT CHANGE UP
AST SERPL-CCNC: 316 U/L — HIGH
BACTERIA # UR AUTO: ABNORMAL /HPF
BASOPHILS # BLD AUTO: 0 K/UL — SIGNIFICANT CHANGE UP (ref 0–0.2)
BASOPHILS NFR BLD AUTO: 0 % — SIGNIFICANT CHANGE UP (ref 0–2)
BILIRUB SERPL-MCNC: 1.1 MG/DL — SIGNIFICANT CHANGE UP (ref 0.4–2)
BILIRUB UR-MCNC: ABNORMAL
BUN SERPL-MCNC: 11.2 MG/DL — SIGNIFICANT CHANGE UP (ref 8–20)
CALCIUM SERPL-MCNC: 8.1 MG/DL — LOW (ref 8.4–10.5)
CHLORIDE SERPL-SCNC: 102 MMOL/L — SIGNIFICANT CHANGE UP (ref 96–108)
CO2 SERPL-SCNC: 20 MMOL/L — LOW (ref 22–29)
COLOR SPEC: SIGNIFICANT CHANGE UP
CREAT SERPL-MCNC: 0.34 MG/DL — LOW (ref 0.5–1.3)
DIFF PNL FLD: NEGATIVE — SIGNIFICANT CHANGE UP
EGFR: 142 ML/MIN/1.73M2 — SIGNIFICANT CHANGE UP
EOSINOPHIL # BLD AUTO: 0 K/UL — SIGNIFICANT CHANGE UP (ref 0–0.5)
EOSINOPHIL NFR BLD AUTO: 0 % — SIGNIFICANT CHANGE UP (ref 0–6)
GIANT PLATELETS BLD QL SMEAR: PRESENT — SIGNIFICANT CHANGE UP
GLUCOSE SERPL-MCNC: 115 MG/DL — HIGH (ref 70–99)
GLUCOSE UR QL: NEGATIVE MG/DL — SIGNIFICANT CHANGE UP
HCG SERPL-ACNC: <4 MIU/ML — SIGNIFICANT CHANGE UP
HCT VFR BLD CALC: 33.7 % — LOW (ref 34.5–45)
HGB BLD-MCNC: 11.2 G/DL — LOW (ref 11.5–15.5)
KETONES UR-MCNC: ABNORMAL MG/DL
LEUKOCYTE ESTERASE UR-ACNC: ABNORMAL
LIDOCAIN IGE QN: 41 U/L — SIGNIFICANT CHANGE UP (ref 22–51)
LYMPHOCYTES # BLD AUTO: 0.54 K/UL — LOW (ref 1–3.3)
LYMPHOCYTES # BLD AUTO: 6.9 % — LOW (ref 13–44)
MANUAL SMEAR VERIFICATION: SIGNIFICANT CHANGE UP
MCHC RBC-ENTMCNC: 28.9 PG — SIGNIFICANT CHANGE UP (ref 27–34)
MCHC RBC-ENTMCNC: 33.2 G/DL — SIGNIFICANT CHANGE UP (ref 32–36)
MCV RBC AUTO: 86.9 FL — SIGNIFICANT CHANGE UP (ref 80–100)
MONOCYTES # BLD AUTO: 0.27 K/UL — SIGNIFICANT CHANGE UP (ref 0–0.9)
MONOCYTES NFR BLD AUTO: 3.5 % — SIGNIFICANT CHANGE UP (ref 2–14)
NEUTROPHILS # BLD AUTO: 7.03 K/UL — SIGNIFICANT CHANGE UP (ref 1.8–7.4)
NEUTROPHILS NFR BLD AUTO: 89.6 % — HIGH (ref 43–77)
NITRITE UR-MCNC: NEGATIVE — SIGNIFICANT CHANGE UP
PH UR: 6 — SIGNIFICANT CHANGE UP (ref 5–8)
PLAT MORPH BLD: NORMAL — SIGNIFICANT CHANGE UP
PLATELET # BLD AUTO: 197 K/UL — SIGNIFICANT CHANGE UP (ref 150–400)
POTASSIUM SERPL-MCNC: 3.4 MMOL/L — LOW (ref 3.5–5.3)
POTASSIUM SERPL-SCNC: 3.4 MMOL/L — LOW (ref 3.5–5.3)
PROT SERPL-MCNC: 6.5 G/DL — LOW (ref 6.6–8.7)
PROT UR-MCNC: SIGNIFICANT CHANGE UP MG/DL
RBC # BLD: 3.88 M/UL — SIGNIFICANT CHANGE UP (ref 3.8–5.2)
RBC # FLD: 13.5 % — SIGNIFICANT CHANGE UP (ref 10.3–14.5)
RBC BLD AUTO: NORMAL — SIGNIFICANT CHANGE UP
RBC CASTS # UR COMP ASSIST: 3 /HPF — SIGNIFICANT CHANGE UP (ref 0–4)
SODIUM SERPL-SCNC: 136 MMOL/L — SIGNIFICANT CHANGE UP (ref 135–145)
SP GR SPEC: >1.03 — HIGH (ref 1–1.03)
SQUAMOUS # UR AUTO: 13 /HPF — HIGH (ref 0–5)
TROPONIN T, HIGH SENSITIVITY RESULT: <6 NG/L — SIGNIFICANT CHANGE UP (ref 0–51)
UROBILINOGEN FLD QL: 2 MG/DL (ref 0.2–1)
WBC # BLD: 7.85 K/UL — SIGNIFICANT CHANGE UP (ref 3.8–10.5)
WBC # FLD AUTO: 7.85 K/UL — SIGNIFICANT CHANGE UP (ref 3.8–10.5)
WBC UR QL: 18 /HPF — HIGH (ref 0–5)

## 2025-01-19 PROCEDURE — 93010 ELECTROCARDIOGRAM REPORT: CPT

## 2025-01-19 PROCEDURE — 99285 EMERGENCY DEPT VISIT HI MDM: CPT

## 2025-01-19 PROCEDURE — 74177 CT ABD & PELVIS W/CONTRAST: CPT | Mod: 26

## 2025-01-19 PROCEDURE — 71045 X-RAY EXAM CHEST 1 VIEW: CPT | Mod: 26

## 2025-01-19 PROCEDURE — 71275 CT ANGIOGRAPHY CHEST: CPT | Mod: 26

## 2025-01-19 RX ORDER — MORPHINE SULFATE 15 MG
4 TABLET, EXTENDED RELEASE ORAL ONCE
Refills: 0 | Status: DISCONTINUED | OUTPATIENT
Start: 2025-01-19 | End: 2025-01-19

## 2025-01-19 RX ORDER — SODIUM CHLORIDE 9 MG/ML
1000 INJECTION, SOLUTION INTRAVENOUS ONCE
Refills: 0 | Status: COMPLETED | OUTPATIENT
Start: 2025-01-19 | End: 2025-01-19

## 2025-01-19 RX ORDER — SUCRALFATE 1 G/10ML
1 SUSPENSION ORAL ONCE
Refills: 0 | Status: COMPLETED | OUTPATIENT
Start: 2025-01-19 | End: 2025-01-19

## 2025-01-19 RX ORDER — PANTOPRAZOLE 40 MG/1
40 TABLET, DELAYED RELEASE ORAL ONCE
Refills: 0 | Status: COMPLETED | OUTPATIENT
Start: 2025-01-19 | End: 2025-01-19

## 2025-01-19 RX ORDER — MORPHINE SULFATE 15 MG
2 TABLET, EXTENDED RELEASE ORAL ONCE
Refills: 0 | Status: DISCONTINUED | OUTPATIENT
Start: 2025-01-19 | End: 2025-01-19

## 2025-01-19 RX ORDER — ONDANSETRON 4 MG/1
4 TABLET ORAL ONCE
Refills: 0 | Status: COMPLETED | OUTPATIENT
Start: 2025-01-19 | End: 2025-01-19

## 2025-01-19 RX ADMIN — ONDANSETRON 4 MILLIGRAM(S): 4 TABLET ORAL at 16:26

## 2025-01-19 RX ADMIN — SODIUM CHLORIDE 1000 MILLILITER(S): 9 INJECTION, SOLUTION INTRAVENOUS at 21:18

## 2025-01-19 RX ADMIN — Medication 4 MILLIGRAM(S): at 19:38

## 2025-01-19 RX ADMIN — Medication 4 MILLIGRAM(S): at 16:26

## 2025-01-19 NOTE — ED PROVIDER NOTE - CLINICAL SUMMARY MEDICAL DECISION MAKING FREE TEXT BOX
Garner: 28 y/o female hx hypothyroid, gastric sleeve last year, hx PE when pregnant and stroke s/p thrombolytics a few months post partum without deficits, psh including ovarian cysectomy, gb/appy removed, p/w acute generalized abd pain more epigastric region with pain to left posterior lower chest worse with inspiration all since this morning. no fever, no vomiting/diarrhea. no urinary symptoms. no other complaints. pt passed out from pain earlier today. HD stable. abd soft with some ttp, and pain left posterior chest. given hx eval for PE and abd pathologies related to gastric sleeve. labs, symptom control, reassess
Patient

## 2025-01-19 NOTE — CONSULT NOTE ADULT - ASSESSMENT
Assessment: Patient is a 30 y/o female with PSH of a sleeve in 2023 presenting with abdominal pain of unknown origin. Bariatrics consulted due to hx of sleeve.     Plan:  -No acute surgical intervention  -MM pain control  -GI cocktail  -Can f/u with her surgeon or Dr. Rodriguez as outpatient should symptoms persists   -s/p cholecystectomy, no indication for RUQ sono or further imaging at this time   -Remainder of care and dispo per ED    Discussed with attending surgeon Dr. Rodriguez Assessment: Patient is a 28 y/o female with PSH of a sleeve in 2023 presenting with abdominal pain of unknown origin. Bariatrics consulted due to hx of sleeve.     Plan:  -No acute surgical intervention  -MM pain control  -GI cocktail  -Can f/u with her surgeon or Dr. Rodriguez as outpatient should symptoms persists   -s/p cholecystectomy, no indication for RUQ sono or further imaging at this time   -Bariatrics will follow if admitted  -Remainder of care and dispo per ED    Discussed with attending surgeon Dr. Rodriguez

## 2025-01-19 NOTE — ED ADULT NURSE NOTE - NSFALLUNIVINTERV_ED_ALL_ED
Bed/Stretcher in lowest position, wheels locked, appropriate side rails in place/Call bell, personal items and telephone in reach/Instruct patient to call for assistance before getting out of bed/chair/stretcher/Non-slip footwear applied when patient is off stretcher/[place] to call system/Physically safe environment - no spills, clutter or unnecessary equipment/Purposeful proactive rounding/Room/bathroom lighting operational, light cord in reach

## 2025-01-19 NOTE — ED PROVIDER NOTE - ATTENDING APP SHARED VISIT CONTRIBUTION OF CARE
Pascual: I performed a face to face bedside interview with patient regarding history of present illness, review of symptoms and past medical history. I completed an independent physical exam and ordered tests/medications as needed.  I have discussed patient's plan of care with advanced care provider. The advanced care provider assisted in  executing the discussed plan. I was available for any questions or issues that may have arose during the execution of the plan of care.

## 2025-01-19 NOTE — ED PROVIDER NOTE - PROGRESS NOTE DETAILS
pt is seen by attending initially agreed With hx , PE and plan   ct scan resulted - seen the pt at the bed side NAD - CT scan result explained- pain med ordered- consult ACS team as well Patient seen by ACS team no acute  intervention.  Will recheck the CMP for LFT. see the patient at the bedside again not acutely distressed given option for DYLON exam rule out he feels any bleeding risk versus benefit explained to the patient. even given the option if to be done with the other female pa  that patient refused at this time. MERE Escamilla chaperone LFTs trending up to keep the patient observation with GI consultation. with the patient's liquid to drink apple juice.

## 2025-01-19 NOTE — CONSULT NOTE ADULT - ATTENDING COMMENTS
Patient seen and examined in ED on 1/20/25.   No surgical intervention indicated  Please reconsult as needed

## 2025-01-19 NOTE — ED PROVIDER NOTE - PHYSICAL EXAMINATION
Gen: No acute distress, non toxic  HEENT: Mucous membranes moist, pink conjunctivae, EOMI  CV: RRR, nl s1/s2.  Resp: CTAB, normal rate and effort  GI: Abd soft, non distended, mild generalized ttp, more upper/left/mid region. neg murphys. not significantly tender lower.   : No CVAT  Neuro: A&O x 3, moving all 4 extremities  MSK: No spine or joint tenderness to palpation. no swelling b/l LE.   Skin: No rashes. intact and perfused.

## 2025-01-19 NOTE — ED ADULT TRIAGE NOTE - CHIEF COMPLAINT QUOTE
pt BIBA from home for reports of 10/10 abdominal pain. pt given 250cc NS and 10mg IV by EMS with "no improvement." pt AOX3. LMP unknown because she has depo shot.

## 2025-01-19 NOTE — ED PROVIDER NOTE - OBJECTIVE STATEMENT
30 y/o female hx hypothyroid, gastric sleeve last year, hx PE when pregnant and stroke s/p thrombolytics a few months post partum without deficits, psh including ovarian cysectomy, gb/appy removed, p/w acute generalized abd pain more epigastric region with pain to left posterior lower chest worse with inspiration all since this morning. no fever, no vomiting/diarrhea. no urinary symptoms. no other complaints. pt states passed out from pain earlier today.

## 2025-01-19 NOTE — ED ADULT NURSE NOTE - OBJECTIVE STATEMENT
Pt A&Ox4 c/o abdominal pain that started this morning, increased in intensity and passed out while in the shower. Hx of PE and CVA related to pregnancy. On Depo-Provera shot. Endorses nausea. Takes ASA. Airway patent. Respirations even and unlabored. On monitor with , NSR. Maintaining sats. Patent EMS IV 20G RAC.

## 2025-01-20 DIAGNOSIS — R74.8 ABNORMAL LEVELS OF OTHER SERUM ENZYMES: ICD-10-CM

## 2025-01-20 LAB
ALBUMIN SERPL ELPH-MCNC: 2.7 G/DL — LOW (ref 3.3–5.2)
ALBUMIN SERPL ELPH-MCNC: 3.5 G/DL — SIGNIFICANT CHANGE UP (ref 3.3–5.2)
ALP SERPL-CCNC: 115 U/L — SIGNIFICANT CHANGE UP (ref 40–120)
ALP SERPL-CCNC: 200 U/L — HIGH (ref 40–120)
ALT FLD-CCNC: 313 U/L — HIGH
ALT FLD-CCNC: 483 U/L — HIGH
ANION GAP SERPL CALC-SCNC: 10 MMOL/L — SIGNIFICANT CHANGE UP (ref 5–17)
ANION GAP SERPL CALC-SCNC: 15 MMOL/L — SIGNIFICANT CHANGE UP (ref 5–17)
APTT BLD: 28.5 SEC — SIGNIFICANT CHANGE UP (ref 24.5–35.6)
AST SERPL-CCNC: 417 U/L — HIGH
AST SERPL-CCNC: 424 U/L — HIGH
BASOPHILS # BLD AUTO: 0.01 K/UL — SIGNIFICANT CHANGE UP (ref 0–0.2)
BASOPHILS NFR BLD AUTO: 0.3 % — SIGNIFICANT CHANGE UP (ref 0–2)
BILIRUB SERPL-MCNC: 1 MG/DL — SIGNIFICANT CHANGE UP (ref 0.4–2)
BILIRUB SERPL-MCNC: 1.1 MG/DL — SIGNIFICANT CHANGE UP (ref 0.4–2)
BUN SERPL-MCNC: 5 MG/DL — LOW (ref 8–20)
BUN SERPL-MCNC: 6 MG/DL — LOW (ref 8–20)
CALCIUM SERPL-MCNC: 6.6 MG/DL — LOW (ref 8.4–10.5)
CALCIUM SERPL-MCNC: 8.7 MG/DL — SIGNIFICANT CHANGE UP (ref 8.4–10.5)
CHLORIDE SERPL-SCNC: 104 MMOL/L — SIGNIFICANT CHANGE UP (ref 96–108)
CHLORIDE SERPL-SCNC: 108 MMOL/L — SIGNIFICANT CHANGE UP (ref 96–108)
CO2 SERPL-SCNC: 16 MMOL/L — LOW (ref 22–29)
CO2 SERPL-SCNC: 24 MMOL/L — SIGNIFICANT CHANGE UP (ref 22–29)
CREAT SERPL-MCNC: 0.32 MG/DL — LOW (ref 0.5–1.3)
CREAT SERPL-MCNC: <0.2 MG/DL — LOW (ref 0.5–1.3)
EGFR: 144 ML/MIN/1.73M2 — SIGNIFICANT CHANGE UP
EGFR: 161 ML/MIN/1.73M2 — SIGNIFICANT CHANGE UP
EOSINOPHIL # BLD AUTO: 0.13 K/UL — SIGNIFICANT CHANGE UP (ref 0–0.5)
EOSINOPHIL NFR BLD AUTO: 4.1 % — SIGNIFICANT CHANGE UP (ref 0–6)
GLUCOSE SERPL-MCNC: 69 MG/DL — LOW (ref 70–99)
GLUCOSE SERPL-MCNC: 98 MG/DL — SIGNIFICANT CHANGE UP (ref 70–99)
HAV IGM SER-ACNC: SIGNIFICANT CHANGE UP
HBV CORE IGM SER-ACNC: SIGNIFICANT CHANGE UP
HBV SURFACE AG SER-ACNC: SIGNIFICANT CHANGE UP
HCT VFR BLD CALC: 32 % — LOW (ref 34.5–45)
HCV AB S/CO SERPL IA: 0.07 S/CO — SIGNIFICANT CHANGE UP (ref 0–0.99)
HCV AB SERPL-IMP: SIGNIFICANT CHANGE UP
HGB BLD-MCNC: 10.7 G/DL — LOW (ref 11.5–15.5)
IMM GRANULOCYTES NFR BLD AUTO: 0.6 % — SIGNIFICANT CHANGE UP (ref 0–0.9)
LIDOCAIN IGE QN: 23 U/L — SIGNIFICANT CHANGE UP (ref 22–51)
LYMPHOCYTES # BLD AUTO: 0.79 K/UL — LOW (ref 1–3.3)
LYMPHOCYTES # BLD AUTO: 25.1 % — SIGNIFICANT CHANGE UP (ref 13–44)
MCHC RBC-ENTMCNC: 28.8 PG — SIGNIFICANT CHANGE UP (ref 27–34)
MCHC RBC-ENTMCNC: 33.4 G/DL — SIGNIFICANT CHANGE UP (ref 32–36)
MCV RBC AUTO: 86.3 FL — SIGNIFICANT CHANGE UP (ref 80–100)
MONOCYTES # BLD AUTO: 0.43 K/UL — SIGNIFICANT CHANGE UP (ref 0–0.9)
MONOCYTES NFR BLD AUTO: 13.7 % — SIGNIFICANT CHANGE UP (ref 2–14)
NEUTROPHILS # BLD AUTO: 1.77 K/UL — LOW (ref 1.8–7.4)
NEUTROPHILS NFR BLD AUTO: 56.2 % — SIGNIFICANT CHANGE UP (ref 43–77)
PLATELET # BLD AUTO: 201 K/UL — SIGNIFICANT CHANGE UP (ref 150–400)
POTASSIUM SERPL-MCNC: 3.3 MMOL/L — LOW (ref 3.5–5.3)
POTASSIUM SERPL-MCNC: 4.8 MMOL/L — SIGNIFICANT CHANGE UP (ref 3.5–5.3)
POTASSIUM SERPL-SCNC: 3.3 MMOL/L — LOW (ref 3.5–5.3)
POTASSIUM SERPL-SCNC: 4.8 MMOL/L — SIGNIFICANT CHANGE UP (ref 3.5–5.3)
PROT SERPL-MCNC: 4.2 G/DL — LOW (ref 6.6–8.7)
PROT SERPL-MCNC: 6 G/DL — LOW (ref 6.6–8.7)
RBC # BLD: 3.71 M/UL — LOW (ref 3.8–5.2)
RBC # FLD: 13.2 % — SIGNIFICANT CHANGE UP (ref 10.3–14.5)
SODIUM SERPL-SCNC: 138 MMOL/L — SIGNIFICANT CHANGE UP (ref 135–145)
SODIUM SERPL-SCNC: 139 MMOL/L — SIGNIFICANT CHANGE UP (ref 135–145)
WBC # BLD: 3.15 K/UL — LOW (ref 3.8–10.5)
WBC # FLD AUTO: 3.15 K/UL — LOW (ref 3.8–10.5)

## 2025-01-20 PROCEDURE — 99203 OFFICE O/P NEW LOW 30 MIN: CPT

## 2025-01-20 PROCEDURE — 99223 1ST HOSP IP/OBS HIGH 75: CPT

## 2025-01-20 PROCEDURE — 74181 MRI ABDOMEN W/O CONTRAST: CPT | Mod: 26

## 2025-01-20 RX ORDER — SODIUM CHLORIDE 9 MG/ML
1000 INJECTION, SOLUTION INTRAVENOUS
Refills: 0 | Status: DISCONTINUED | OUTPATIENT
Start: 2025-01-20 | End: 2025-01-27

## 2025-01-20 RX ORDER — LEVOTHYROXINE SODIUM 175 UG/1
150 TABLET ORAL DAILY
Refills: 0 | Status: DISCONTINUED | OUTPATIENT
Start: 2025-01-20 | End: 2025-01-27

## 2025-01-20 RX ORDER — ACETAMINOPHEN 80 MG/.8ML
650 SOLUTION/ DROPS ORAL ONCE
Refills: 0 | Status: COMPLETED | OUTPATIENT
Start: 2025-01-20 | End: 2025-01-20

## 2025-01-20 RX ORDER — MORPHINE SULFATE 15 MG
2 TABLET, EXTENDED RELEASE ORAL EVERY 6 HOURS
Refills: 0 | Status: DISCONTINUED | OUTPATIENT
Start: 2025-01-20 | End: 2025-01-20

## 2025-01-20 RX ORDER — CALCIUM GLUCONATE 94 MG/ML
1 INJECTION, SOLUTION INTRAVENOUS ONCE
Refills: 0 | Status: COMPLETED | OUTPATIENT
Start: 2025-01-20 | End: 2025-01-20

## 2025-01-20 RX ORDER — POTASSIUM CHLORIDE 600 MG/1
10 TABLET, FILM COATED, EXTENDED RELEASE ORAL
Refills: 0 | Status: COMPLETED | OUTPATIENT
Start: 2025-01-20 | End: 2025-01-20

## 2025-01-20 RX ORDER — MAG HYDROX/ALUMINUM HYD/SIMETH 200-200-20
30 SUSPENSION, ORAL (FINAL DOSE FORM) ORAL EVERY 6 HOURS
Refills: 0 | Status: DISCONTINUED | OUTPATIENT
Start: 2025-01-20 | End: 2025-01-27

## 2025-01-20 RX ORDER — PANTOPRAZOLE 40 MG/1
40 TABLET, DELAYED RELEASE ORAL DAILY
Refills: 0 | Status: DISCONTINUED | OUTPATIENT
Start: 2025-01-20 | End: 2025-01-27

## 2025-01-20 RX ADMIN — Medication 2 MILLIGRAM(S): at 11:20

## 2025-01-20 RX ADMIN — ACETAMINOPHEN 650 MILLIGRAM(S): 80 SOLUTION/ DROPS ORAL at 17:14

## 2025-01-20 RX ADMIN — POTASSIUM CHLORIDE 100 MILLIEQUIVALENT(S): 600 TABLET, FILM COATED, EXTENDED RELEASE ORAL at 03:15

## 2025-01-20 RX ADMIN — Medication 30 MILLILITER(S): at 17:40

## 2025-01-20 RX ADMIN — POTASSIUM CHLORIDE 100 MILLIEQUIVALENT(S): 600 TABLET, FILM COATED, EXTENDED RELEASE ORAL at 03:18

## 2025-01-20 RX ADMIN — LEVOTHYROXINE SODIUM 150 MICROGRAM(S): 175 TABLET ORAL at 06:14

## 2025-01-20 RX ADMIN — POTASSIUM CHLORIDE 100 MILLIEQUIVALENT(S): 600 TABLET, FILM COATED, EXTENDED RELEASE ORAL at 06:14

## 2025-01-20 RX ADMIN — PANTOPRAZOLE 40 MILLIGRAM(S): 40 TABLET, DELAYED RELEASE ORAL at 00:03

## 2025-01-20 RX ADMIN — SODIUM CHLORIDE 75 MILLILITER(S): 9 INJECTION, SOLUTION INTRAVENOUS at 04:25

## 2025-01-20 RX ADMIN — CALCIUM GLUCONATE 100 GRAM(S): 94 INJECTION, SOLUTION INTRAVENOUS at 03:19

## 2025-01-20 RX ADMIN — SODIUM CHLORIDE 75 MILLILITER(S): 9 INJECTION, SOLUTION INTRAVENOUS at 19:48

## 2025-01-20 RX ADMIN — SUCRALFATE 1 GRAM(S): 1 SUSPENSION ORAL at 00:03

## 2025-01-20 RX ADMIN — Medication 2 MILLIGRAM(S): at 00:03

## 2025-01-20 NOTE — ED CDU PROVIDER INITIAL DAY NOTE - OBJECTIVE STATEMENT
30 y/o female hx hypothyroid, gastric sleeve last year, hx PE when pregnant and stroke s/p thrombolytics a few months post partum without deficits, psh including ovarian cysectomy, gb/appy removed, p/w acute generalized abd pain more epigastric region with pain to left posterior lower chest worse with inspiration all since this morning and diarrhea . no fever, no vomiting/diarrhea. no urinary symptoms. no other complaints. pt states passed out from pain earlier today.

## 2025-01-20 NOTE — CONSULT NOTE ADULT - SUBJECTIVE AND OBJECTIVE BOX
HPI: 30 y/o female hx hypothyroid, gastric sleeve in 2023, hx PE when pregnant and stroke s/p thrombolytics a few months post partum without deficits, psh including ovarian cysectomy, gb/appy removed, p/w acute generalized abd pain more epigastric region with pain to left posterior lower chest worse with inspiration all since this morning. no fever, no vomiting/diarrhea. no urinary symptoms. no other complaints. States that the pain began at rest and has gradually worsened. Labs with mild acidosis and transaminitis, however CT c/a/p without pathology. Bariatric consulted due to hx of sleeve.    Patient seen and examined at bedside. Continues to have upper abdominal pain with intermittent nausea. No other acute complaints.                           11.2   7.85  )-----------( 197      ( 19 Jan 2025 16:30 )             33.7   01-19    136  |  102  |  11.2  ----------------------------<  115[H]  3.4[L]   |  20.0[L]  |  0.34[L]    Ca    8.1[L]      19 Jan 2025 16:30    TPro  6.5[L]  /  Alb  3.8  /  TBili  1.1  /  DBili  x   /  AST  316[H]  /  ALT  166[H]  /  AlkPhos  113  01-19    VITALS:   T(C): 36.9 (01-19-25 @ 14:33), Max: 36.9 (01-19-25 @ 14:33)  HR: 80 (01-19-25 @ 18:30) (80 - 80)  BP: 125/88 (01-19-25 @ 18:30) (110/76 - 125/88)  RR: 18 (01-19-25 @ 18:30) (18 - 18)  SpO2: 99% (01-19-25 @ 18:30) (98% - 99%)    GENERAL: NAD, lying in bed comfortably  HEAD:  Atraumatic, normocephalic  EYES: Conjunctiva and sclera clear  ENT: Moist mucous membranes  NECK: Supple, no JVD  HEART: Regular rate and rhythm  LUNGS: Unlabored respirations.    ABDOMEN: Soft, epigastric ttp, nd and no rebound or guarding   EXTREMITIES: 2+ peripheral pulses bilaterally. No clubbing, cyanosis, or edema  NERVOUS SYSTEM:  A&Ox3, no focal deficits   SKIN: No rashes or lesions    < from: CT Abdomen and Pelvis w/ IV Cont (01.19.25 @ 22:16) >    IMPRESSION:  No acute abnormality detected.    < end of copied text >  
Chief Complaint:  Patient is a 29y old  Female who presents with a chief complaint of abdominal pain    HPI:  This is a 30 y/o female hx hypothyroid, gastric sleeve last year, hx PE when pregnant and stroke s/p thrombolytics a few months post partum without deficits who presents with abdominal pain. GI consulted for abdominal, with elevated liver enzymes. Patient reports her abdominal pain started around 4-5pm yesterday. Her pain starts in RUQ, radiating to upper left quadrant and around to her back in a circumferential pattern. She describes the pain at squeezing in nature. She has never had this pain. Pain was so severe, patient endorses to +LOC for 4-5 minutes, as per family at bedside.  CT A/P and MRI without any acute pathology. She only had nausea yesterday, but today also reporting vomiting. She also reports 3 episodes of diarrhea prior to her abdominal pain. Took 1 dose of Tylenol yesterday for the pain without relief. Denies excessive Tylenol use. No recent antibiotic use or new medications. Denies alcohol use and IVDU. Rise in liver enzymes since admission. Alk phos 200, AST//483. 4 months postpartum. Denies chest pain, shortness of breath, fever, chills, sick contacts, recent travel.      PAST MEDICAL & SURGICAL HISTORY:  Hypothyroidism      History of pulmonary embolism  23 weeks gestation on VQ scan Had lovenox till delivery      H/O dilation and curettage      S/P appendectomy  2001      S/P cholecystectomy  2013      S/P ovarian cystectomy  2014          REVIEW OF SYSTEMS:   General: Negative  HEENT: Negative  CV: Negative  Respiratory: Negative  GI: See HPI  : Negative  MSK: Negative  Hematologic: Negative  Skin: Negative    MEDICATIONS:   MEDICATIONS  (STANDING):  dextrose 5% + sodium chloride 0.45%. 1000 milliLiter(s) (75 mL/Hr) IV Continuous <Continuous>  levothyroxine 150 MICROGram(s) Oral daily  pantoprazole  Injectable 40 milliGRAM(s) IV Push daily    MEDICATIONS  (PRN):  aluminum hydroxide/magnesium hydroxide/simethicone Suspension 30 milliLiter(s) Oral every 6 hours PRN Dyspepsia  morphine  - Injectable 2 milliGRAM(s) IV Push every 6 hours PRN Moderate Pain (4 - 6)      DIET:  Diet, NPO after Midnight:      NPO Start Date: 20-Jan-2025,   NPO Start Time: 23:59 (01-20-25 @ 13:51) [Active]          ALLERGIES:   Allergies    No Known Allergies    Intolerances        Substance Use:   (  ) never used  (  ) other:  Tobacco Usage:  (   ) never smoked   (   ) former smoker   (   ) current smoker  (     ) pack year  (        ) last cigarette date  Alcohol Usage:    Family History   IBD (  ) Yes   (  ) No  GI Malignancy (  )  Yes    (  ) No    Health Management  Last Colonoscopy:  Last Endoscopy:     VITAL SIGNS:   Vital Signs Last 24 Hrs  T(C): 36.8 (20 Jan 2025 03:59), Max: 36.8 (20 Jan 2025 00:47)  T(F): 98.2 (20 Jan 2025 03:59), Max: 98.3 (20 Jan 2025 00:47)  HR: 88 (20 Jan 2025 07:30) (80 - 88)  BP: 122/74 (20 Jan 2025 07:30) (110/69 - 125/88)  BP(mean): --  RR: 16 (20 Jan 2025 07:30) (15 - 18)  SpO2: 98% (20 Jan 2025 07:30) (96% - 99%)    Parameters below as of 20 Jan 2025 07:30  Patient On (Oxygen Delivery Method): room air      I&O's Summary      PHYSICAL EXAM:   GENERAL:  No acute distress  HEENT:  NC/AT, conjunctiva clear, sclera anicteric  CHEST:  No increased effort  HEART:  Regular rate  ABDOMEN:  Soft, +tender, non-distended, + bowel sounds, no rebound or guarding  EXTREMITIES: No edema  SKIN:  Warm, dry  NEURO:  Calm, cooperative    LABS:                        10.7   3.15  )-----------( 201      ( 20 Jan 2025 08:42 )             32.0     Hemoglobin: 10.7 g/dL (01-20-25 @ 08:42)  Hemoglobin: 11.2 g/dL (01-19-25 @ 16:30)    01-20    138  |  104  |  5.0[L]  ----------------------------<  98  4.8   |  24.0  |  0.32[L]    Ca    8.7      20 Jan 2025 08:42    TPro  6.0[L]  /  Alb  3.5  /  TBili  1.0  /  DBili  x   /  AST  417[H]  /  ALT  483[H]  /  AlkPhos  200[H]  01-20    LIVER FUNCTIONS - ( 20 Jan 2025 08:42 )  Alb: 3.5 g/dL / Pro: 6.0 g/dL / ALK PHOS: 200 U/L / ALT: 483 U/L / AST: 417 U/L / GGT: x             PTT - ( 20 Jan 2025 08:42 )  PTT:28.5 sec    Lipase: 23 U/L (01-20-25 @ 08:42)  Lipase: 41 U/L (01-19-25 @ 16:30)    RADIOLOGY & ADDITIONAL STUDIES:      ACC: [id number] EXAM:  MR MRCP   ORDERED BY: RANDEE TRINIDAD     PROCEDURE DATE:  01/20/2025          INTERPRETATION:  CLINICAL INFORMATION: Epigastric is pain and elevated   LFTs. While IT    COMPARISON: CT performed 1/19/2025.    CONTRAST/COMPLICATIONS:  IV Contrast: NONE  Oral Contrast: NONE  .    PROCEDURE:  MRI/MRCP was performed. Radial and 3D MRCP sequences were obtained.      FINDINGS:    Evaluation of the solid organs and vasculature is limited in the absence   of intravenous contrast.  LIVER: Normal morphology. No evidence of steatosis.  BILE DUCTS: Normal caliber. No evidence of choledocholithiasis.  GALLBLADDER: Cholecystectomy.  SPLEEN: Within normal limits.  PANCREAS: Within normal limits.  ADRENALS: Within normal limits.  KIDNEYS/URETERS: Within normal limits.    VISUALIZED PORTIONS:  BOWEL: Within normal limits.  PERITONEUM: No ascites.  VESSELS: Within normal limits.  RETROPERITONEUM/LYMPH NODES: No lymphadenopathy.  ABDOMINAL WALL: Within normal limits.    IMPRESSION:  Normal MRCP. No biliary ductal dilatation or acute findings, as on CT.        --- End of Report ---            ALEKS HURTADO MD; Attending Radiologist  This document has been electronically signed. Jan 20 2025 10:28AM  01-20-25 @ 09:57    ACC: [id number] EXAM:  CT ABDOMEN AND PELVIS IC   ORDERED BY: JAMISON MORLEY     ACC: [id number] EXAM:  CT ANGIO CHEST PULM ART WAWIC   ORDERED BY: JAMISON MORLEY     PROCEDURE DATE:  01/19/2025          INTERPRETATION:  CLINICAL INFORMATION: Chest pain. Clinical concern for   pulmonary embolism. Abdominal pain. History of appendectomy,   cholecystectomy, ovarian cystectomy, and CT scans gastrectomy.    COMPARISON: None.    CONTRAST/COMPLICATIONS:  IV Contrast: Omnipaque 350 (accession [id number]), IV contrast documented   in unlinked concurrent exam (accession [id number])  90 cc administered   10   cc discarded  Oral Contrast: NONE  .    PROCEDURE:  CT Angiography of the Chest was performed followed by portal venous phase   imaging of the Abdomen and Pelvis.  Sagittal and coronal reformats were performed as well as 3D (MIP)   reconstructions.    FINDINGS:  CHEST:  LUNGS AND LARGE AIRWAYS: Patent central airways. No pulmonary nodules or   parenchymal consolidation. Moderate dependent atelectasis.  PLEURA: No pleural effusion.  VESSELS: Within normal limits.  HEART: Heart size is normal. No pericardial effusion.  MEDIASTINUM AND LAURA: No lymphadenopathy.  CHEST WALL AND LOWER NECK: Within normal limits.    ABDOMEN AND PELVIS:  LIVER: Within normal limits.  BILE DUCTS: Normal caliber.  GALLBLADDER: Cholecystectomy.  SPLEEN: Within normal limits.  PANCREAS: Within normal limits.  ADRENALS: Within normal limits.  KIDNEYS/URETERS: Within normal limits.    BLADDER: Within normal limits.  REPRODUCTIVE ORGANS: Uterus and adnexa within normal limits.    BOWEL: No bowel obstruction. Appendix is not visualized. Colonic   diverticulosis without acute diverticulitis. Sleeve gastrectomy surgical   changes, without CT evident complications.  PERITONEUM/RETROPERITONEUM: Within normal limits.  VESSELS: Within normal limits.  LYMPH NODES: No lymphadenopathy.  ABDOMINAL WALL: Within normal limits.  BONES: Within normal limits.    IMPRESSION:  No acute abnormality detected.        --- End of Report ---            VANESSA VILLELA MD; Attending Radiologist  This document has been electronically signed. Jan 19 2025 11:10PM  01-19-25 @ 22:16

## 2025-01-20 NOTE — CONSULT NOTE ADULT - ASSESSMENT
This is a 28 y/o female hx hypothyroid, gastric sleeve last year, hx PE when pregnant and stroke s/p thrombolytics a few months post partum without deficits who presents with abdominal pain. GI consulted for abdominal, with elevated liver enzymes.    #abdominal pain   #nausea/vomiting   #elevated liver enzymes  Hx of gastric sleeve  CT A/P IC (01.19.25) No acute abnormality detected.  MR MRCP (01.20.25) Normal MRCP. No biliary ductal dilatation or acute findings  Lipase normal  T bili normal   AST//166 (admission) --> 417/483 (today)  Alk phos 113 (admission) --> 200 (today)    - Trend CMP daily  - Pain management per primary team  - Recommend clear liquids today, Advance tomorrow as tolerated  - Antiemetics as needed  - Avoid hepatotoxins  - Will order acute hep panel, EBV, CMV to r/o viral etiology   - If no improvement in liver enzymes will tentatively schedule patient for EGD w/ EUS on Wednesday 1/22   _________________________________________________________________  Assessment and recommendations are final when note is signed by the attending physician.

## 2025-01-20 NOTE — CONSULT NOTE ADULT - NS ATTEND AMEND GEN_ALL_CORE FT
Hypothroidism, PE after pregnancy 2016, CVA after pregnancy 2022, sleeve gastrectomy     Pt is 4 months post partum      Pain Started with RUQ  pain starting yesterday radiated to epigastric area, then to left back .  Worse with inspiration. Vomited X 1 episode. No fevers. States passed out LOC 4 minutes as per  at bedside. NO heartburn, no regurgitation, No recent supplement, antibioticsm and no sick contacts. + HA today . Took one dose of tylenol for HA . no ETOH, no IVDA.      WBC 3.15, Bilirubin 1, Alk phos 200, ,  , bilirubin normal . CT showed S/P gastric sleeve, diverticulosis, normal liver . MRCP - no CBD stones, S/P cholecystectomy       A- +BS, soft, + tenderness on palpation of the  RUQ  and epigastric area               A/P   abdominal pain   trend LFT - cmp , cbc ordered  maybe viral etiology vs passage of sludge ( pt is S/P cholecystectomy)  I ordered  Hep A, B, C ab   MRCP - my interpretation. S/P Cholecystectomy, CBD normal - no stones   IF LFT do not decrease or increase, may need to consider EUS ot evaluate the CBD

## 2025-01-20 NOTE — ED CDU PROVIDER INITIAL DAY NOTE - PHYSICAL EXAMINATION
Const: AOX3 nontoxic appearing, no apparent respiratory or physical distress. Stable gait   HEENT: NC/AT. Moist mucous membranes.  Eyes: AIDE. EOMI  Neck: Soft and supple. Full ROM without pain.  Cardiac: Regular rate and regular rhythm. +S1/S2. No murmurs. Peripheral pulses 2+ and symmetric. No LE edema.  Resp: Speaking in full sentences. No evidence of respiratory distress.   Abd: Soft, generalized- epigastric tender, non-distended.  No guarding or rebound.  Back: Spine midline and non-tender. No CVAT.  Skin: No rashes, abrasions or lacerations.  Neuro: Awake, alert & oriented x 3. Moves all extremities symmetrically.

## 2025-01-20 NOTE — CHART NOTE - NSCHARTNOTEFT_GEN_A_CORE
Pt in ED with abdominal pain  CT and MRCP negative     No working surgical diagnosis at this time    Rest of care per ED.     ED/med (if admitted) to reconsult surgery if any new questions or concerns arise

## 2025-01-20 NOTE — ED CDU PROVIDER INITIAL DAY NOTE - PROGRESS NOTE DETAILS
spoke to GI advises to keep patient on clears, if rpt LFTs continue to rise in AM will plan for EGD/US for wednesday. Patient signed out from Day PA. Patient presenting to the ED for epigastric pain, diarrhea, CP. GI consulted. Pending am labs and reassessment.

## 2025-01-20 NOTE — CONSULT NOTE ADULT - NS ATTEST RISK PROBLEM GEN_ALL_CORE FT
elevated LFT, abdominal pain    WBC 3.15, Bilirubin 1, Alk phos 200, ,  , bilirubin normal   trend LFT - cmp , cbc ordered  maybe viral etiology vs passage of sludge ( pt is S/P cholecystectomy)  I ordered  Hep A, B, C ab   MRCP - my interpretation. S/P Cholecystectomy, CBD normal - no stones   IF LFT do not decrease or increase, may need to consider EUS ot evaluate the CBD  Discussed plan ER MERE Do - recommend hospital admisison

## 2025-01-20 NOTE — ED CDU PROVIDER INITIAL DAY NOTE - ATTENDING APP SHARED VISIT CONTRIBUTION OF CARE
I agree with the PA's note and was available for any issues/concerns. I was directly involved in patient care. My brief overall assessment is as follows: 30 y/o female hx hypothyroid, gastric sleeve last year, hx PE when pregnant and stroke s/p thrombolytics a few months post partum without deficits, psh including ovarian cysectomy, gb/appy removed, p/w acute generalized abd pain more epigastric region with pain to left posterior lower chest worse with inspiration all since this morning and diarrhea . no fever, no vomiting/diarrhea. no urinary symptoms. no other complaints. pt states passed out from pain earlier today.  - CTA chest PE protocol, CT abdomen pelvis without acute finding  ACS consulted no acute intervention  -Will keep within observation for GI consult   -hypokalemia , hypocalcemia,  replaced with IV  - continues IV fluids D5 half-normal @75  - MRCP

## 2025-01-20 NOTE — ED ADULT NURSE REASSESSMENT NOTE - NS ED NURSE REASSESS COMMENT FT1
Patient resting comfortably in bed. No acute distress noted. Potassium infusing. Patient to receive an additional potassium and have AM labs repeated when infusion is finished.

## 2025-01-20 NOTE — ED CDU PROVIDER INITIAL DAY NOTE - CLINICAL SUMMARY MEDICAL DECISION MAKING FREE TEXT BOX
28 y/o female hx hypothyroid, gastric sleeve last year, hx PE when pregnant and stroke s/p thrombolytics a few months post partum without deficits, psh including ovarian cysectomy, gb/appy removed, p/w acute generalized abd pain more epigastric region with pain to left posterior lower chest worse with inspiration all since this morning and diarrhea . no fever, no vomiting/diarrhea. no urinary symptoms. no other complaints. pt states passed out from pain earlier today.  - CTA chest PE protocol, CT abdomen pelvis without acute finding  ACS consulted no acute intervention  -Will keep within observation for GI consult   -hypokalemia , hypocalcemia,  replaced with IV  - continues IV fluids D5 half-normal @75  - MRCP  - diet full liquids  - morphine as needed  - hypothyroidism on levothyroxine  - PPI40 IV push and Maalox as needed 30 y/o female hx hypothyroid, gastric sleeve last year, hx PE when pregnant and stroke s/p thrombolytics a few months post partum without deficits, psh including ovarian cysectomy, gb/appy removed, p/w acute generalized abd pain more epigastric region with pain to left posterior lower chest worse with inspiration all since this morning and diarrhea . no fever, no vomiting/diarrhea. no urinary symptoms. no other complaints. pt states passed out from pain earlier today.  - CTA chest PE protocol, CT abdomen pelvis without acute finding  ACS consulted no acute intervention  -Will keep within observation for GI consult   -hypokalemia , hypocalcemia,  replaced with IV  - continues IV fluids D5 half-normal @75  - MRCP  -LFTs trending up will check for acute hepatitis PT PTT repeat the CBC- follow GI consult  - diet full liquids  - morphine as needed  - hypothyroidism on levothyroxine  - PPI40 IV push and Maalox as needed

## 2025-01-20 NOTE — ED ADULT NURSE REASSESSMENT NOTE - NS ED NURSE REASSESS COMMENT FT1
Assumed care of patient at 19:15 from BOB Middleton. Patient A&O x4, denies pain, no complaints of CP/SOB, no acute distress noted. Patient lying comfortably in bed, bed locked and in lowest position, safety maintained. Patient updated on plan of care, awaiting GI and AM labs. Patient remains on CM in NSR.

## 2025-01-20 NOTE — ED ADULT NURSE REASSESSMENT NOTE - NS ED NURSE REASSESS COMMENT FT1
Patient A&O x4, offering no complaints at this time, no acute distress noted. Patient updated on plan of care.

## 2025-01-20 NOTE — CONSULT NOTE ADULT - PROBLEM SELECTOR RECOMMENDATION 9
abdominal pain   trend LFT - cmp , cbc ordered  maybe viral etiology vs passage of sludge ( pt is S/P cholecystectomy)  I ordered  Hep A, B, C ab   MRCP - my interpretation. S/P Cholecystectomy, CBD normal - no stones   IF LFT do not decrease or increase, may need to consider EUS ot evaluate the CBD

## 2025-01-21 VITALS
DIASTOLIC BLOOD PRESSURE: 77 MMHG | RESPIRATION RATE: 16 BRPM | OXYGEN SATURATION: 98 % | SYSTOLIC BLOOD PRESSURE: 118 MMHG | TEMPERATURE: 98 F | HEART RATE: 77 BPM

## 2025-01-21 LAB
ALBUMIN SERPL ELPH-MCNC: 3.8 G/DL — SIGNIFICANT CHANGE UP (ref 3.3–5.2)
ALP SERPL-CCNC: 230 U/L — HIGH (ref 40–120)
ALT FLD-CCNC: 348 U/L — HIGH
ANION GAP SERPL CALC-SCNC: 17 MMOL/L — SIGNIFICANT CHANGE UP (ref 5–17)
AST SERPL-CCNC: 136 U/L — HIGH
BASOPHILS # BLD AUTO: 0.03 K/UL — SIGNIFICANT CHANGE UP (ref 0–0.2)
BASOPHILS NFR BLD AUTO: 0.8 % — SIGNIFICANT CHANGE UP (ref 0–2)
BILIRUB SERPL-MCNC: 0.3 MG/DL — LOW (ref 0.4–2)
BUN SERPL-MCNC: 4.8 MG/DL — LOW (ref 8–20)
CALCIUM SERPL-MCNC: 8.8 MG/DL — SIGNIFICANT CHANGE UP (ref 8.4–10.5)
CHLORIDE SERPL-SCNC: 102 MMOL/L — SIGNIFICANT CHANGE UP (ref 96–108)
CO2 SERPL-SCNC: 25 MMOL/L — SIGNIFICANT CHANGE UP (ref 22–29)
CREAT SERPL-MCNC: 0.44 MG/DL — LOW (ref 0.5–1.3)
CULTURE RESULTS: SIGNIFICANT CHANGE UP
EGFR: 133 ML/MIN/1.73M2 — SIGNIFICANT CHANGE UP
EOSINOPHIL # BLD AUTO: 0.25 K/UL — SIGNIFICANT CHANGE UP (ref 0–0.5)
EOSINOPHIL NFR BLD AUTO: 6.7 % — HIGH (ref 0–6)
GLUCOSE SERPL-MCNC: 91 MG/DL — SIGNIFICANT CHANGE UP (ref 70–99)
HAV IGM SER-ACNC: SIGNIFICANT CHANGE UP
HBV CORE IGM SER-ACNC: SIGNIFICANT CHANGE UP
HBV SURFACE AG SER-ACNC: SIGNIFICANT CHANGE UP
HCT VFR BLD CALC: 35.3 % — SIGNIFICANT CHANGE UP (ref 34.5–45)
HCV AB S/CO SERPL IA: 0.09 S/CO — SIGNIFICANT CHANGE UP (ref 0–0.99)
HCV AB SERPL-IMP: SIGNIFICANT CHANGE UP
HGB BLD-MCNC: 11.7 G/DL — SIGNIFICANT CHANGE UP (ref 11.5–15.5)
IMM GRANULOCYTES NFR BLD AUTO: 0.5 % — SIGNIFICANT CHANGE UP (ref 0–0.9)
LYMPHOCYTES # BLD AUTO: 1.34 K/UL — SIGNIFICANT CHANGE UP (ref 1–3.3)
LYMPHOCYTES # BLD AUTO: 35.7 % — SIGNIFICANT CHANGE UP (ref 13–44)
MCHC RBC-ENTMCNC: 29 PG — SIGNIFICANT CHANGE UP (ref 27–34)
MCHC RBC-ENTMCNC: 33.1 G/DL — SIGNIFICANT CHANGE UP (ref 32–36)
MCV RBC AUTO: 87.6 FL — SIGNIFICANT CHANGE UP (ref 80–100)
MONOCYTES # BLD AUTO: 0.49 K/UL — SIGNIFICANT CHANGE UP (ref 0–0.9)
MONOCYTES NFR BLD AUTO: 13.1 % — SIGNIFICANT CHANGE UP (ref 2–14)
NEUTROPHILS # BLD AUTO: 1.62 K/UL — LOW (ref 1.8–7.4)
NEUTROPHILS NFR BLD AUTO: 43.2 % — SIGNIFICANT CHANGE UP (ref 43–77)
PLATELET # BLD AUTO: 229 K/UL — SIGNIFICANT CHANGE UP (ref 150–400)
POTASSIUM SERPL-MCNC: 4.1 MMOL/L — SIGNIFICANT CHANGE UP (ref 3.5–5.3)
POTASSIUM SERPL-SCNC: 4.1 MMOL/L — SIGNIFICANT CHANGE UP (ref 3.5–5.3)
PROT SERPL-MCNC: 6.5 G/DL — LOW (ref 6.6–8.7)
RBC # BLD: 4.03 M/UL — SIGNIFICANT CHANGE UP (ref 3.8–5.2)
RBC # FLD: 13.2 % — SIGNIFICANT CHANGE UP (ref 10.3–14.5)
SODIUM SERPL-SCNC: 144 MMOL/L — SIGNIFICANT CHANGE UP (ref 135–145)
SPECIMEN SOURCE: SIGNIFICANT CHANGE UP
WBC # BLD: 3.75 K/UL — LOW (ref 3.8–10.5)
WBC # FLD AUTO: 3.75 K/UL — LOW (ref 3.8–10.5)

## 2025-01-21 PROCEDURE — 84484 ASSAY OF TROPONIN QUANT: CPT

## 2025-01-21 PROCEDURE — 36415 COLL VENOUS BLD VENIPUNCTURE: CPT

## 2025-01-21 PROCEDURE — 87799 DETECT AGENT NOS DNA QUANT: CPT

## 2025-01-21 PROCEDURE — 81001 URINALYSIS AUTO W/SCOPE: CPT

## 2025-01-21 PROCEDURE — 99285 EMERGENCY DEPT VISIT HI MDM: CPT | Mod: 25

## 2025-01-21 PROCEDURE — 96374 THER/PROPH/DIAG INJ IV PUSH: CPT | Mod: XU

## 2025-01-21 PROCEDURE — 71045 X-RAY EXAM CHEST 1 VIEW: CPT

## 2025-01-21 PROCEDURE — 96375 TX/PRO/DX INJ NEW DRUG ADDON: CPT | Mod: XU

## 2025-01-21 PROCEDURE — 93005 ELECTROCARDIOGRAM TRACING: CPT

## 2025-01-21 PROCEDURE — 74177 CT ABD & PELVIS W/CONTRAST: CPT | Mod: MC

## 2025-01-21 PROCEDURE — 74181 MRI ABDOMEN W/O CONTRAST: CPT | Mod: MC

## 2025-01-21 PROCEDURE — 99239 HOSP IP/OBS DSCHRG MGMT >30: CPT

## 2025-01-21 PROCEDURE — 84702 CHORIONIC GONADOTROPIN TEST: CPT

## 2025-01-21 PROCEDURE — 83690 ASSAY OF LIPASE: CPT

## 2025-01-21 PROCEDURE — 85730 THROMBOPLASTIN TIME PARTIAL: CPT

## 2025-01-21 PROCEDURE — 80053 COMPREHEN METABOLIC PANEL: CPT

## 2025-01-21 PROCEDURE — 80074 ACUTE HEPATITIS PANEL: CPT

## 2025-01-21 PROCEDURE — 87077 CULTURE AEROBIC IDENTIFY: CPT

## 2025-01-21 PROCEDURE — 87086 URINE CULTURE/COLONY COUNT: CPT

## 2025-01-21 PROCEDURE — 71275 CT ANGIOGRAPHY CHEST: CPT | Mod: MC

## 2025-01-21 PROCEDURE — G0378: CPT

## 2025-01-21 PROCEDURE — 85025 COMPLETE CBC W/AUTO DIFF WBC: CPT

## 2025-01-21 PROCEDURE — 96376 TX/PRO/DX INJ SAME DRUG ADON: CPT | Mod: XU

## 2025-01-21 PROCEDURE — 99231 SBSQ HOSP IP/OBS SF/LOW 25: CPT

## 2025-01-21 RX ADMIN — LEVOTHYROXINE SODIUM 150 MICROGRAM(S): 175 TABLET ORAL at 05:14

## 2025-01-21 NOTE — PROGRESS NOTE ADULT - PROBLEM SELECTOR PLAN 1
improving - ALk phos 230 , ,   No pain   tolerating diet  D/C Home  F/U in 3-4 weeks. Will need repeat LFT   will check liver serologies at time of office visit

## 2025-01-21 NOTE — ED ADULT NURSE REASSESSMENT NOTE - NS ED NURSE REASSESS COMMENT FT1
Received report from BERNA RN & assumed care of pt. pt A+Ox4, pt resting in bed, calm and cooperative, offers no complaints, resps even and unlabored b/l, no SOB, NO CP, POC reviewed, CM in progress. Gait steady, call bell within reach, safety maintained. pending GI consult.

## 2025-01-21 NOTE — ED ADULT NURSE REASSESSMENT NOTE - NS ED NURSE REASSESS COMMENT FT1
Pt updated on the plan of care, pt educated about tests performed, to be performed, pt states understanding. Pt not in any pain or distress at this time, pt education deemed successful after teach back shows proficiency. Pending discharge.

## 2025-01-21 NOTE — PROGRESS NOTE ADULT - NS ATTEST RISK PROBLEM GEN_ALL_CORE FT
elevated LFT, abdominal pain   improving - ALk phos 230 , ,   No pain   tolerating diet  D/C Home  F/U in 3-4 weeks. Will need repeat LFT   will check liver serologies at time of office visit

## 2025-01-21 NOTE — ED CDU PROVIDER DISPOSITION NOTE - CLINICAL COURSE
Patient with abdominal pain, found to have elevated LFTs, seen by GI, LFTs down-trending.  MRCP performed negative for obstructing stones.  Patient expressed improvement in her current symptoms, tolerating PO.  GI reports can follow up as outpatient.  Notice made for patient for office to contact her. Also RX internally sent, can be performed at any [facility] lab facility.  Patient amendable to plan.  Given copies of all results, understands and agrees to proceed.

## 2025-01-21 NOTE — ED CDU PROVIDER DISPOSITION NOTE - ATTENDING CONTRIBUTION TO CARE
Patient with abdominal pain, found to have elevated LFTs, seen by GI, LFTs down-trending.  MRCP performed negative for obstructing stones.  Patient expressed improvement in her current symptoms, tolerating PO.  GI reports can follow up as outpatient.  Notice made for patient for office to contact her. Also RX internally sent, can be performed at any [facility] lab facility.  Patient amendable to plan.  Given copies of all results, understands and agrees to proceed.    I, Emmanuel Kohli, performed the initial face to face bedside interview with this patient regarding history of present illness, review of symptoms and relevant past medical, social and family history.  I completed an independent physical examination.  I was the initial provider who evaluated this patient. I have signed out the follow up of any pending tests (i.e. labs, radiological studies) to the ACP.  I have communicated the patient’s plan of care and disposition with the ACP.

## 2025-01-21 NOTE — ED CDU PROVIDER DISPOSITION NOTE - PATIENT PORTAL LINK FT
You can access the FollowMyHealth Patient Portal offered by [facility] by registering at the following website: [email or web address]. By joining [employer]’s FollowMyHealth portal, you will also be able to view your health information using other applications (apps) compatible with our system.

## 2025-01-21 NOTE — ED CDU PROVIDER DISPOSITION NOTE - NSFOLLOWUPINSTRUCTIONS_ED_ALL_ED_FT
Please follow up with GI  Please have repeat blood work performed before your office visit  You can go to any [facility] laboratory a script is already in the system for you  Return if symptoms persist or worsen    Abdominal Pain    Many things can cause abdominal pain. Many times, abdominal pain is not caused by a disease and will improve without treatment. Your health care provider will do a physical exam to determine if there is a dangerous cause of your pain; blood tests and imaging may help determine the cause of your pain. However, in many cases, no cause may be found and you may need further testing as an outpatient. Monitor your abdominal pain for any changes.     SEEK IMMEDIATE MEDICAL CARE IF YOU HAVE ANY OF THE FOLLOWING SYMPTOMS: worsening abdominal pain, uncontrollable vomiting, profuse diarrhea, inability to have bowel movements or pass gas, black or bloody stools, fever accompanying chest pain or back pain, or fainting. These symptoms may represent a serious problem that is an emergency. Do not wait to see if the symptoms will go away. Get medical help right away. Call 911 and do not drive yourself to the hospital.

## 2025-01-21 NOTE — ED CDU PROVIDER SUBSEQUENT DAY NOTE - CLINICAL SUMMARY MEDICAL DECISION MAKING FREE TEXT BOX
28 y/o female hx hypothyroid, gastric sleeve last year, hx PE when pregnant and stroke s/p thrombolytics a few months post partum without deficits, psh including ovarian cysectomy, gb/appy removed, p/w acute generalized abd pain more epigastric region with pain to left posterior lower chest worse with inspiration all since this morning and diarrhea. GI following. CT and MRCP with no acute finding. Pending AM labs and reassessment

## 2025-01-21 NOTE — PROGRESS NOTE ADULT - SUBJECTIVE AND OBJECTIVE BOX
Chief Complaint:  Patient is a 29y old  Female who presents with a chief complaint of abdominal pain    HPI/ 24 hr events: Patient seen and examined at bedside. Pt feeling well this morning, her abdominal pain has improved. Tolerating diet. Denies nausea, vomiting, diarrhea, hematemesis, hematochezia, melena. Vitals are overall stable. Her LFTs are improving.    REVIEW OF SYSTEMS:   General: Negative  HEENT: Negative  CV: Negative  Respiratory: Negative  GI: See HPI  : Negative  MSK: Negative  Hematologic: Negative  Skin: Negative    MEDICATIONS:   MEDICATIONS  (STANDING):  dextrose 5% + sodium chloride 0.45%. 1000 milliLiter(s) (75 mL/Hr) IV Continuous <Continuous>  levothyroxine 150 MICROGram(s) Oral daily  pantoprazole  Injectable 40 milliGRAM(s) IV Push daily    MEDICATIONS  (PRN):  aluminum hydroxide/magnesium hydroxide/simethicone Suspension 30 milliLiter(s) Oral every 6 hours PRN Dyspepsia  morphine  - Injectable 2 milliGRAM(s) IV Push every 6 hours PRN Moderate Pain (4 - 6)            DIET:  Diet, Regular (01-21-25 @ 10:48) [Active]  Diet, NPO after Midnight:      NPO Start Date: 20-Jan-2025,   NPO Start Time: 23:59 (01-20-25 @ 13:51) [Active]          ALLERGIES:   Allergies    No Known Allergies    Intolerances        VITAL SIGNS:   Vital Signs Last 24 Hrs  T(C): 36.9 (21 Jan 2025 07:39), Max: 36.9 (20 Jan 2025 16:20)  T(F): 98.4 (21 Jan 2025 07:39), Max: 98.5 (20 Jan 2025 16:20)  HR: 91 (21 Jan 2025 07:39) (61 - 91)  BP: 104/70 (21 Jan 2025 07:39) (103/66 - 119/78)  BP(mean): --  RR: 17 (21 Jan 2025 07:39) (15 - 18)  SpO2: 99% (21 Jan 2025 07:39) (96% - 99%)    Parameters below as of 21 Jan 2025 07:39  Patient On (Oxygen Delivery Method): room air      I&O's Summary      PHYSICAL EXAM:   GENERAL:  No acute distress  HEENT:  NC/AT, conjunctiva clear, sclera anicteric  CHEST:  No increased effort  HEART:  Regular rate  ABDOMEN:  Soft, non-tender, non-distended, normoactive bowel sounds, no rebound or guarding  EXTREMITIES: No edema  SKIN:  Warm, dry  NEURO:  Calm, cooperative    LABS:                        11.7   3.75  )-----------( 229      ( 21 Jan 2025 05:20 )             35.3     Hemoglobin: 11.7 g/dL (01-21-25 @ 05:20)  Hemoglobin: 10.7 g/dL (01-20-25 @ 08:42)  Hemoglobin: 11.2 g/dL (01-19-25 @ 16:30)    01-21    144  |  102  |  4.8[L]  ----------------------------<  91  4.1   |  25.0  |  0.44[L]    Ca    8.8      21 Jan 2025 05:20    TPro  6.5[L]  /  Alb  3.8  /  TBili  0.3[L]  /  DBili  x   /  AST  136[H]  /  ALT  348[H]  /  AlkPhos  230[H]  01-21    LIVER FUNCTIONS - ( 21 Jan 2025 05:20 )  Alb: 3.8 g/dL / Pro: 6.5 g/dL / ALK PHOS: 230 U/L / ALT: 348 U/L / AST: 136 U/L / GGT: x             PTT - ( 20 Jan 2025 08:42 )  PTT:28.5 sec        Hepatitis A IgM Antibody: Nonreact (01-20-25 @ 14:55)  Hepatitis B Core IgM Antibody: Nonreact (01-20-25 @ 14:55)  Hepatitis B Surface Antigen: Nonreact (01-20-25 @ 14:55)  Hepatitis C Virus S/CO Ratio: 0.07 S/CO (01-20-25 @ 14:55)                                  RADIOLOGY & ADDITIONAL STUDIES:      ACC: [id number] EXAM:  MR MRCP   ORDERED BY: RANDEE TRINIDAD     PROCEDURE DATE:  01/20/2025          INTERPRETATION:  CLINICAL INFORMATION: Epigastric is pain and elevated   LFTs. While IT    COMPARISON: CT performed 1/19/2025.    CONTRAST/COMPLICATIONS:  IV Contrast: NONE  Oral Contrast: NONE  .    PROCEDURE:  MRI/MRCP was performed. Radial and 3D MRCP sequences were obtained.      FINDINGS:    Evaluation of the solid organs and vasculature is limited in the absence   of intravenous contrast.  LIVER: Normal morphology. No evidence of steatosis.  BILE DUCTS: Normal caliber. No evidence of choledocholithiasis.  GALLBLADDER: Cholecystectomy.  SPLEEN: Within normal limits.  PANCREAS: Within normal limits.  ADRENALS: Within normal limits.  KIDNEYS/URETERS: Within normal limits.    VISUALIZED PORTIONS:  BOWEL: Within normal limits.  PERITONEUM: No ascites.  VESSELS: Within normal limits.  RETROPERITONEUM/LYMPH NODES: No lymphadenopathy.  ABDOMINAL WALL: Within normal limits.    IMPRESSION:  Normal MRCP. No biliary ductal dilatation or acute findings, as on CT.        --- End of Report ---            ALEKS HURTADO MD; Attending Radiologist  This document has been electronically signed. Jan 20 2025 10:28AM  01-20-25 @ 09:57    ACC: [id number] EXAM:  CT ABDOMEN AND PELVIS IC   ORDERED BY: JAMISON MORLEY     ACC: [id number] EXAM:  CT ANGIO CHEST PULM ART WAWIC   ORDERED BY: JAMISON MORLEY     PROCEDURE DATE:  01/19/2025          INTERPRETATION:  CLINICAL INFORMATION: Chest pain. Clinical concern for   pulmonary embolism. Abdominal pain. History of appendectomy,   cholecystectomy, ovarian cystectomy, and CT scans gastrectomy.    COMPARISON: None.    CONTRAST/COMPLICATIONS:  IV Contrast: Omnipaque 350 (accession [id number]), IV contrast documented   in unlinked concurrent exam (accession [id number])  90 cc administered   10   cc discarded  Oral Contrast: NONE  .    PROCEDURE:  CT Angiography of the Chest was performed followed by portal venous phase   imaging of the Abdomen and Pelvis.  Sagittal and coronal reformats were performed as well as 3D (MIP)   reconstructions.    FINDINGS:  CHEST:  LUNGS AND LARGE AIRWAYS: Patent central airways. No pulmonary nodules or   parenchymal consolidation. Moderate dependent atelectasis.  PLEURA: No pleural effusion.  VESSELS: Within normal limits.  HEART: Heart size is normal. No pericardial effusion.  MEDIASTINUM AND LAURA: No lymphadenopathy.  CHEST WALL AND LOWER NECK: Within normal limits.    ABDOMEN AND PELVIS:  LIVER: Within normal limits.  BILE DUCTS: Normal caliber.  GALLBLADDER: Cholecystectomy.  SPLEEN: Within normal limits.  PANCREAS: Within normal limits.  ADRENALS: Within normal limits.  KIDNEYS/URETERS: Within normal limits.    BLADDER: Within normal limits.  REPRODUCTIVE ORGANS: Uterus and adnexa within normal limits.    BOWEL: No bowel obstruction. Appendix is not visualized. Colonic   diverticulosis without acute diverticulitis. Sleeve gastrectomy surgical   changes, without CT evident complications.  PERITONEUM/RETROPERITONEUM: Within normal limits.  VESSELS: Within normal limits.  LYMPH NODES: No lymphadenopathy.  ABDOMINAL WALL: Within normal limits.  BONES: Within normal limits.    IMPRESSION:  No acute abnormality detected.        --- End of Report ---            VANESSA VILLELA MD; Attending Radiologist  This document has been electronically signed. Jan 19 2025 11:10PM  01-19-25 @ 22:16

## 2025-01-21 NOTE — ED CDU PROVIDER SUBSEQUENT DAY NOTE - ATTENDING APP SHARED VISIT CONTRIBUTION OF CARE
28 y/o female hx hypothyroid, gastric sleeve last year, hx PE when pregnant and stroke s/p thrombolytics a few months post partum without deficits, psh including ovarian cysectomy, gb/appy removed, p/w acute generalized abd pain more epigastric region with pain to left posterior lower chest worse with inspiration all since this morning and diarrhea. GI following. CT and MRCP with no acute finding. Pending AM labs and reassessment    I, Emmanuel Kohli, performed the initial face to face bedside interview with this patient regarding history of present illness, review of symptoms and relevant past medical, social and family history.  I completed an independent physical examination.  I was the initial provider who evaluated this patient. I have signed out the follow up of any pending tests (i.e. labs, radiological studies) to the ACP.  I have communicated the patient’s plan of care and disposition with the ACP.

## 2025-01-21 NOTE — ED CDU PROVIDER DISPOSITION NOTE - CARE PROVIDER_API CALL
Lea Delcid  Transplant Hepatology  [address], [address]  [place] [address]  Phone: [phone]  Fax: [phone]  Follow Up Time:

## 2025-01-21 NOTE — PROGRESS NOTE ADULT - ASSESSMENT
This is a 30 y/o female hx hypothyroid, gastric sleeve last year, hx PE when pregnant and stroke s/p thrombolytics a few months post partum without deficits who presents with abdominal pain. GI consulted for abdominal, with elevated liver enzymes.    #abdominal pain   #nausea/vomiting   #elevated liver enzymes  Hx of gastric sleeve  CT A/P IC (01.19.25) No acute abnormality detected.  MR MRCP (01.20.25) Normal MRCP. No biliary ductal dilatation or acute findings  Lipase normal  T bili normal   AST//166 (admission) --> 136/348 (today)  Alk phos 113 (admission) --> 230 (today)    - Trend CMP daily, LFTs improving, ? passed stone/sludge   - Regular diet as tolerated  - Antiemetics as needed  - Avoid hepatotoxins  - Will need outpatient follow up with hepatologist Dr. Delcid, message sent to office   - Will need repeat CMP within 1 week, script ordered  _________________________________________________________________  Assessment and recommendations are final when note is signed by the attending physician.

## 2025-01-22 ENCOUNTER — APPOINTMENT (OUTPATIENT)
Dept: GASTROENTEROLOGY | Facility: CLINIC | Age: 30
End: 2025-01-22

## 2025-01-22 VITALS
HEART RATE: 78 BPM | OXYGEN SATURATION: 99 % | SYSTOLIC BLOOD PRESSURE: 118 MMHG | WEIGHT: 139 LBS | DIASTOLIC BLOOD PRESSURE: 72 MMHG | BODY MASS INDEX: 28.02 KG/M2 | HEIGHT: 59 IN | RESPIRATION RATE: 14 BRPM

## 2025-01-22 DIAGNOSIS — R79.89 OTHER SPECIFIED ABNORMAL FINDINGS OF BLOOD CHEMISTRY: ICD-10-CM

## 2025-01-22 LAB
CMV DNA CSF QL NAA+PROBE: SIGNIFICANT CHANGE UP IU/ML
CMV DNA SPEC NAA+PROBE-LOG#: SIGNIFICANT CHANGE UP LOG10IU/ML
EBV DNA SERPL NAA+PROBE-ACNC: SIGNIFICANT CHANGE UP IU/ML
EBVPCR LOG: SIGNIFICANT CHANGE UP LOG10IU/ML

## 2025-01-22 PROCEDURE — 99205 OFFICE O/P NEW HI 60 MIN: CPT

## 2025-02-05 ENCOUNTER — APPOINTMENT (OUTPATIENT)
Dept: FAMILY MEDICINE | Facility: CLINIC | Age: 30
End: 2025-02-05

## 2025-02-05 DIAGNOSIS — L73.9 FOLLICULAR DISORDER, UNSPECIFIED: ICD-10-CM

## 2025-02-05 RX ORDER — MUPIROCIN 20 MG/G
2 OINTMENT TOPICAL 3 TIMES DAILY
Qty: 1 | Refills: 2 | Status: ACTIVE | COMMUNITY
Start: 2025-02-05 | End: 1900-01-01

## 2025-02-11 ENCOUNTER — APPOINTMENT (OUTPATIENT)
Dept: GASTROENTEROLOGY | Facility: CLINIC | Age: 30
End: 2025-02-11
Payer: MEDICAID

## 2025-02-11 VITALS
RESPIRATION RATE: 16 BRPM | BODY MASS INDEX: 28.63 KG/M2 | HEIGHT: 59 IN | SYSTOLIC BLOOD PRESSURE: 115 MMHG | HEART RATE: 70 BPM | WEIGHT: 142 LBS | DIASTOLIC BLOOD PRESSURE: 70 MMHG | OXYGEN SATURATION: 98 %

## 2025-02-11 PROCEDURE — 99214 OFFICE O/P EST MOD 30 MIN: CPT

## 2025-02-11 RX ORDER — DOXYCYCLINE 100 MG/1
100 CAPSULE ORAL TWICE DAILY
Qty: 14 | Refills: 0 | Status: DISCONTINUED | COMMUNITY
Start: 2025-02-05 | End: 2025-02-11

## 2025-02-13 ENCOUNTER — APPOINTMENT (OUTPATIENT)
Dept: ELECTROPHYSIOLOGY | Facility: CLINIC | Age: 30
End: 2025-02-13

## 2025-02-13 PROCEDURE — 93298 REM INTERROG DEV EVAL SCRMS: CPT

## 2025-03-20 ENCOUNTER — APPOINTMENT (OUTPATIENT)
Dept: ELECTROPHYSIOLOGY | Facility: CLINIC | Age: 30
End: 2025-03-20
Payer: MEDICAID

## 2025-03-20 ENCOUNTER — NON-APPOINTMENT (OUTPATIENT)
Age: 30
End: 2025-03-20

## 2025-03-20 PROCEDURE — 93298 REM INTERROG DEV EVAL SCRMS: CPT

## 2025-04-23 ENCOUNTER — RX RENEWAL (OUTPATIENT)
Age: 30
End: 2025-04-23

## 2025-04-24 ENCOUNTER — NON-APPOINTMENT (OUTPATIENT)
Age: 30
End: 2025-04-24

## 2025-04-24 ENCOUNTER — APPOINTMENT (OUTPATIENT)
Dept: ELECTROPHYSIOLOGY | Facility: CLINIC | Age: 30
End: 2025-04-24
Payer: MEDICAID

## 2025-04-24 PROCEDURE — 93298 REM INTERROG DEV EVAL SCRMS: CPT

## 2025-05-12 ENCOUNTER — APPOINTMENT (OUTPATIENT)
Dept: FAMILY MEDICINE | Facility: CLINIC | Age: 30
End: 2025-05-12

## 2025-05-29 ENCOUNTER — APPOINTMENT (OUTPATIENT)
Dept: ELECTROPHYSIOLOGY | Facility: CLINIC | Age: 30
End: 2025-05-29
Payer: MEDICAID

## 2025-05-29 ENCOUNTER — NON-APPOINTMENT (OUTPATIENT)
Age: 30
End: 2025-05-29

## 2025-05-29 PROCEDURE — 93298 REM INTERROG DEV EVAL SCRMS: CPT

## 2025-06-06 ENCOUNTER — APPOINTMENT (OUTPATIENT)
Dept: FAMILY MEDICINE | Facility: CLINIC | Age: 30
End: 2025-06-06

## 2025-06-30 ENCOUNTER — APPOINTMENT (OUTPATIENT)
Dept: ELECTROPHYSIOLOGY | Facility: CLINIC | Age: 30
End: 2025-06-30
Payer: MEDICAID

## 2025-06-30 ENCOUNTER — NON-APPOINTMENT (OUTPATIENT)
Age: 30
End: 2025-06-30

## 2025-06-30 PROCEDURE — 93298 REM INTERROG DEV EVAL SCRMS: CPT

## 2025-07-15 ENCOUNTER — APPOINTMENT (OUTPATIENT)
Dept: FAMILY MEDICINE | Facility: CLINIC | Age: 30
End: 2025-07-15
Payer: MEDICAID

## 2025-07-15 VITALS
HEIGHT: 59 IN | SYSTOLIC BLOOD PRESSURE: 110 MMHG | DIASTOLIC BLOOD PRESSURE: 68 MMHG | BODY MASS INDEX: 30.04 KG/M2 | TEMPERATURE: 98.2 F | OXYGEN SATURATION: 98 % | WEIGHT: 149 LBS | HEART RATE: 78 BPM

## 2025-07-15 PROBLEM — R55 NEAR SYNCOPE: Status: ACTIVE | Noted: 2025-07-15

## 2025-07-15 PROCEDURE — 99214 OFFICE O/P EST MOD 30 MIN: CPT | Mod: 25

## 2025-07-17 LAB
ALBUMIN SERPL ELPH-MCNC: 4.6 G/DL
ALP BLD-CCNC: 77 U/L
ALT SERPL-CCNC: 18 U/L
ANION GAP SERPL CALC-SCNC: 15 MMOL/L
AST SERPL-CCNC: 22 U/L
BASOPHILS # BLD AUTO: 0.05 K/UL
BASOPHILS NFR BLD AUTO: 0.6 %
BILIRUB SERPL-MCNC: 0.2 MG/DL
BUN SERPL-MCNC: 15 MG/DL
CALCIUM SERPL-MCNC: 9.4 MG/DL
CHLORIDE SERPL-SCNC: 105 MMOL/L
CO2 SERPL-SCNC: 21 MMOL/L
CREAT SERPL-MCNC: 0.46 MG/DL
EGFRCR SERPLBLD CKD-EPI 2021: 131 ML/MIN/1.73M2
EOSINOPHIL # BLD AUTO: 0.23 K/UL
EOSINOPHIL NFR BLD AUTO: 2.7 %
ESTIMATED AVERAGE GLUCOSE: 105 MG/DL
GGT SERPL-CCNC: 32 U/L
GLUCOSE SERPL-MCNC: 82 MG/DL
HBA1C MFR BLD HPLC: 5.3 %
HCT VFR BLD CALC: 37.3 %
HGB BLD-MCNC: 12.1 G/DL
IMM GRANULOCYTES NFR BLD AUTO: 0.5 %
LYMPHOCYTES # BLD AUTO: 1.45 K/UL
LYMPHOCYTES NFR BLD AUTO: 17.2 %
MAN DIFF?: NORMAL
MCHC RBC-ENTMCNC: 28 PG
MCHC RBC-ENTMCNC: 32.4 G/DL
MCV RBC AUTO: 86.3 FL
MONOCYTES # BLD AUTO: 0.88 K/UL
MONOCYTES NFR BLD AUTO: 10.5 %
NEUTROPHILS # BLD AUTO: 5.76 K/UL
NEUTROPHILS NFR BLD AUTO: 68.5 %
PLATELET # BLD AUTO: 295 K/UL
POTASSIUM SERPL-SCNC: 3.8 MMOL/L
PROT SERPL-MCNC: 7.3 G/DL
RBC # BLD: 4.32 M/UL
RBC # FLD: 13.8 %
SODIUM SERPL-SCNC: 141 MMOL/L
T3FREE SERPL-MCNC: 2.03 PG/ML
T4 FREE SERPL-MCNC: 0.9 NG/DL
TSH SERPL-ACNC: 42.7 UIU/ML
WBC # FLD AUTO: 8.41 K/UL

## 2025-08-04 ENCOUNTER — APPOINTMENT (OUTPATIENT)
Dept: ELECTROPHYSIOLOGY | Facility: CLINIC | Age: 30
End: 2025-08-04

## 2025-08-04 PROCEDURE — 93298 REM INTERROG DEV EVAL SCRMS: CPT

## 2025-09-08 ENCOUNTER — APPOINTMENT (OUTPATIENT)
Dept: ELECTROPHYSIOLOGY | Facility: CLINIC | Age: 30
End: 2025-09-08
Payer: MEDICAID

## 2025-09-08 ENCOUNTER — NON-APPOINTMENT (OUTPATIENT)
Age: 30
End: 2025-09-08

## 2025-09-08 PROCEDURE — 93298 REM INTERROG DEV EVAL SCRMS: CPT

## 2025-09-11 ENCOUNTER — RX CHANGE (OUTPATIENT)
Age: 30
End: 2025-09-11

## 2025-09-19 ENCOUNTER — APPOINTMENT (OUTPATIENT)
Dept: MRI IMAGING | Facility: CLINIC | Age: 30
End: 2025-09-19